# Patient Record
Sex: FEMALE | Race: WHITE | NOT HISPANIC OR LATINO | URBAN - METROPOLITAN AREA
[De-identification: names, ages, dates, MRNs, and addresses within clinical notes are randomized per-mention and may not be internally consistent; named-entity substitution may affect disease eponyms.]

---

## 2017-04-14 ENCOUNTER — EMERGENCY (EMERGENCY)
Facility: HOSPITAL | Age: 22
LOS: 1 days | End: 2017-04-14
Payer: COMMERCIAL

## 2017-04-14 PROCEDURE — 99283 EMERGENCY DEPT VISIT LOW MDM: CPT

## 2019-07-07 ENCOUNTER — EMERGENCY (EMERGENCY)
Facility: HOSPITAL | Age: 24
LOS: 1 days | End: 2019-07-07
Admitting: EMERGENCY MEDICINE
Payer: MEDICAID

## 2019-07-07 PROCEDURE — 99283 EMERGENCY DEPT VISIT LOW MDM: CPT

## 2019-07-26 ENCOUNTER — EMERGENCY (EMERGENCY)
Facility: HOSPITAL | Age: 24
LOS: 1 days | End: 2019-07-26
Admitting: EMERGENCY MEDICINE
Payer: MEDICAID

## 2019-07-26 PROCEDURE — 99284 EMERGENCY DEPT VISIT MOD MDM: CPT

## 2019-08-03 ENCOUNTER — EMERGENCY (EMERGENCY)
Facility: HOSPITAL | Age: 24
LOS: 1 days | End: 2019-08-03
Admitting: EMERGENCY MEDICINE
Payer: MEDICAID

## 2019-08-03 ENCOUNTER — EMERGENCY (EMERGENCY)
Facility: HOSPITAL | Age: 24
LOS: 1 days | End: 2019-08-03
Admitting: EMERGENCY MEDICINE

## 2019-08-03 PROCEDURE — 99283 EMERGENCY DEPT VISIT LOW MDM: CPT

## 2019-08-07 ENCOUNTER — EMERGENCY (EMERGENCY)
Facility: HOSPITAL | Age: 24
LOS: 1 days | End: 2019-08-07
Admitting: EMERGENCY MEDICINE
Payer: MEDICAID

## 2019-08-07 PROCEDURE — 90792 PSYCH DIAG EVAL W/MED SRVCS: CPT | Mod: GT

## 2019-08-07 PROCEDURE — 99283 EMERGENCY DEPT VISIT LOW MDM: CPT

## 2019-08-07 PROCEDURE — 99285 EMERGENCY DEPT VISIT HI MDM: CPT

## 2019-08-08 PROCEDURE — 93010 ELECTROCARDIOGRAM REPORT: CPT

## 2019-08-16 ENCOUNTER — EMERGENCY (EMERGENCY)
Facility: HOSPITAL | Age: 24
LOS: 1 days | End: 2019-08-16
Admitting: EMERGENCY MEDICINE
Payer: MEDICAID

## 2019-08-16 PROCEDURE — 99283 EMERGENCY DEPT VISIT LOW MDM: CPT

## 2019-08-20 ENCOUNTER — EMERGENCY (EMERGENCY)
Facility: HOSPITAL | Age: 24
LOS: 1 days | End: 2019-08-20
Admitting: EMERGENCY MEDICINE
Payer: MEDICAID

## 2019-08-20 PROCEDURE — 99282 EMERGENCY DEPT VISIT SF MDM: CPT

## 2019-10-10 ENCOUNTER — EMERGENCY (EMERGENCY)
Facility: HOSPITAL | Age: 24
LOS: 1 days | End: 2019-10-10
Admitting: EMERGENCY MEDICINE
Payer: MEDICAID

## 2019-10-10 PROCEDURE — 93010 ELECTROCARDIOGRAM REPORT: CPT

## 2019-10-10 PROCEDURE — 99285 EMERGENCY DEPT VISIT HI MDM: CPT

## 2019-10-10 PROCEDURE — 90792 PSYCH DIAG EVAL W/MED SRVCS: CPT | Mod: GT

## 2019-10-10 PROCEDURE — 73130 X-RAY EXAM OF HAND: CPT | Mod: 26,LT

## 2019-10-11 ENCOUNTER — INPATIENT (INPATIENT)
Facility: HOSPITAL | Age: 24
LOS: 23 days | Discharge: HOME | End: 2019-11-04
Attending: PSYCHIATRY & NEUROLOGY | Admitting: PSYCHIATRY & NEUROLOGY
Payer: MEDICAID

## 2019-10-11 VITALS — WEIGHT: 204.81 LBS | TEMPERATURE: 98 F | HEIGHT: 66 IN

## 2019-10-11 DIAGNOSIS — F31.2 BIPOLAR DISORDER, CURRENT EPISODE MANIC SEVERE WITH PSYCHOTIC FEATURES: ICD-10-CM

## 2019-10-11 LAB
ALBUMIN SERPL ELPH-MCNC: 4.4 G/DL — SIGNIFICANT CHANGE UP (ref 3.3–5)
ALP SERPL-CCNC: 58 U/L — SIGNIFICANT CHANGE UP (ref 40–120)
ALT FLD-CCNC: 10 U/L — SIGNIFICANT CHANGE UP (ref 4–33)
AMPHET UR-MCNC: NEGATIVE — SIGNIFICANT CHANGE UP
ANION GAP SERPL CALC-SCNC: 15 MMO/L — HIGH (ref 7–14)
APPEARANCE UR: SIGNIFICANT CHANGE UP
AST SERPL-CCNC: 18 U/L — SIGNIFICANT CHANGE UP (ref 4–32)
BACTERIA # UR AUTO: NEGATIVE — SIGNIFICANT CHANGE UP
BARBITURATES UR SCN-MCNC: NEGATIVE — SIGNIFICANT CHANGE UP
BASOPHILS # BLD AUTO: 0.04 K/UL — SIGNIFICANT CHANGE UP (ref 0–0.2)
BASOPHILS NFR BLD AUTO: 0.8 % — SIGNIFICANT CHANGE UP (ref 0–2)
BENZODIAZ UR-MCNC: NEGATIVE — SIGNIFICANT CHANGE UP
BILIRUB SERPL-MCNC: 0.4 MG/DL — SIGNIFICANT CHANGE UP (ref 0.2–1.2)
BILIRUB UR-MCNC: NEGATIVE — SIGNIFICANT CHANGE UP
BLOOD UR QL VISUAL: NEGATIVE — SIGNIFICANT CHANGE UP
BUN SERPL-MCNC: 12 MG/DL — SIGNIFICANT CHANGE UP (ref 7–23)
CALCIUM SERPL-MCNC: 9.7 MG/DL — SIGNIFICANT CHANGE UP (ref 8.4–10.5)
CANNABINOIDS UR-MCNC: NEGATIVE — SIGNIFICANT CHANGE UP
CHLORIDE SERPL-SCNC: 103 MMOL/L — SIGNIFICANT CHANGE UP (ref 98–107)
CHOLEST SERPL-MCNC: 138 MG/DL — SIGNIFICANT CHANGE UP (ref 120–199)
CO2 SERPL-SCNC: 23 MMOL/L — SIGNIFICANT CHANGE UP (ref 22–31)
COCAINE METAB.OTHER UR-MCNC: NEGATIVE — SIGNIFICANT CHANGE UP
COLOR SPEC: YELLOW — SIGNIFICANT CHANGE UP
CREAT SERPL-MCNC: 0.64 MG/DL — SIGNIFICANT CHANGE UP (ref 0.5–1.3)
EOSINOPHIL # BLD AUTO: 0.11 K/UL — SIGNIFICANT CHANGE UP (ref 0–0.5)
EOSINOPHIL NFR BLD AUTO: 2.1 % — SIGNIFICANT CHANGE UP (ref 0–6)
GLUCOSE SERPL-MCNC: 82 MG/DL — SIGNIFICANT CHANGE UP (ref 70–99)
GLUCOSE UR-MCNC: NEGATIVE — SIGNIFICANT CHANGE UP
HBA1C BLD-MCNC: 4.8 % — SIGNIFICANT CHANGE UP (ref 4–5.6)
HCG SERPL-ACNC: < 5 MIU/ML — SIGNIFICANT CHANGE UP
HCT VFR BLD CALC: 43.9 % — SIGNIFICANT CHANGE UP (ref 34.5–45)
HDLC SERPL-MCNC: 60 MG/DL — SIGNIFICANT CHANGE UP (ref 45–65)
HGB BLD-MCNC: 14.3 G/DL — SIGNIFICANT CHANGE UP (ref 11.5–15.5)
HYALINE CASTS # UR AUTO: HIGH
IMM GRANULOCYTES NFR BLD AUTO: 0.2 % — SIGNIFICANT CHANGE UP (ref 0–1.5)
KETONES UR-MCNC: NEGATIVE — SIGNIFICANT CHANGE UP
LEUKOCYTE ESTERASE UR-ACNC: SIGNIFICANT CHANGE UP
LIPID PNL WITH DIRECT LDL SERPL: 76 MG/DL — SIGNIFICANT CHANGE UP
LYMPHOCYTES # BLD AUTO: 1.71 K/UL — SIGNIFICANT CHANGE UP (ref 1–3.3)
LYMPHOCYTES # BLD AUTO: 32.6 % — SIGNIFICANT CHANGE UP (ref 13–44)
MCHC RBC-ENTMCNC: 29.7 PG — SIGNIFICANT CHANGE UP (ref 27–34)
MCHC RBC-ENTMCNC: 32.6 % — SIGNIFICANT CHANGE UP (ref 32–36)
MCV RBC AUTO: 91.3 FL — SIGNIFICANT CHANGE UP (ref 80–100)
METHADONE UR-MCNC: NEGATIVE — SIGNIFICANT CHANGE UP
MONOCYTES # BLD AUTO: 0.29 K/UL — SIGNIFICANT CHANGE UP (ref 0–0.9)
MONOCYTES NFR BLD AUTO: 5.5 % — SIGNIFICANT CHANGE UP (ref 2–14)
NEUTROPHILS # BLD AUTO: 3.09 K/UL — SIGNIFICANT CHANGE UP (ref 1.8–7.4)
NEUTROPHILS NFR BLD AUTO: 58.8 % — SIGNIFICANT CHANGE UP (ref 43–77)
NITRITE UR-MCNC: NEGATIVE — SIGNIFICANT CHANGE UP
NRBC # FLD: 0 K/UL — SIGNIFICANT CHANGE UP (ref 0–0)
OPIATES UR-MCNC: NEGATIVE — SIGNIFICANT CHANGE UP
OXYCODONE UR-MCNC: NEGATIVE — SIGNIFICANT CHANGE UP
PCP UR-MCNC: NEGATIVE — SIGNIFICANT CHANGE UP
PH UR: 6.5 — SIGNIFICANT CHANGE UP (ref 5–8)
PLATELET # BLD AUTO: 219 K/UL — SIGNIFICANT CHANGE UP (ref 150–400)
PMV BLD: 10.8 FL — SIGNIFICANT CHANGE UP (ref 7–13)
POTASSIUM SERPL-MCNC: 3.8 MMOL/L — SIGNIFICANT CHANGE UP (ref 3.5–5.3)
POTASSIUM SERPL-SCNC: 3.8 MMOL/L — SIGNIFICANT CHANGE UP (ref 3.5–5.3)
PROT SERPL-MCNC: 6.9 G/DL — SIGNIFICANT CHANGE UP (ref 6–8.3)
PROT UR-MCNC: 10 — SIGNIFICANT CHANGE UP
RBC # BLD: 4.81 M/UL — SIGNIFICANT CHANGE UP (ref 3.8–5.2)
RBC # FLD: 11.9 % — SIGNIFICANT CHANGE UP (ref 10.3–14.5)
RBC CASTS # UR COMP ASSIST: SIGNIFICANT CHANGE UP (ref 0–?)
SODIUM SERPL-SCNC: 141 MMOL/L — SIGNIFICANT CHANGE UP (ref 135–145)
SP GR SPEC: 1.02 — SIGNIFICANT CHANGE UP (ref 1–1.04)
SQUAMOUS # UR AUTO: SIGNIFICANT CHANGE UP
TRIGL SERPL-MCNC: 61 MG/DL — SIGNIFICANT CHANGE UP (ref 10–149)
TSH SERPL-MCNC: 2.77 UIU/ML — SIGNIFICANT CHANGE UP (ref 0.27–4.2)
UROBILINOGEN FLD QL: NORMAL — SIGNIFICANT CHANGE UP
WBC # BLD: 5.25 K/UL — SIGNIFICANT CHANGE UP (ref 3.8–10.5)
WBC # FLD AUTO: 5.25 K/UL — SIGNIFICANT CHANGE UP (ref 3.8–10.5)
WBC UR QL: HIGH (ref 0–?)

## 2019-10-11 PROCEDURE — 93010 ELECTROCARDIOGRAM REPORT: CPT

## 2019-10-11 PROCEDURE — 99232 SBSQ HOSP IP/OBS MODERATE 35: CPT

## 2019-10-11 RX ORDER — OLANZAPINE 15 MG/1
5 TABLET, FILM COATED ORAL AT BEDTIME
Refills: 0 | Status: DISCONTINUED | OUTPATIENT
Start: 2019-10-11 | End: 2019-10-13

## 2019-10-11 RX ORDER — DIPHENHYDRAMINE HCL 50 MG
50 CAPSULE ORAL ONCE
Refills: 0 | Status: DISCONTINUED | OUTPATIENT
Start: 2019-10-11 | End: 2019-11-04

## 2019-10-11 RX ORDER — HALOPERIDOL DECANOATE 100 MG/ML
5 INJECTION INTRAMUSCULAR ONCE
Refills: 0 | Status: DISCONTINUED | OUTPATIENT
Start: 2019-10-11 | End: 2019-11-04

## 2019-10-11 RX ORDER — DIPHENHYDRAMINE HCL 50 MG
50 CAPSULE ORAL EVERY 6 HOURS
Refills: 0 | Status: DISCONTINUED | OUTPATIENT
Start: 2019-10-11 | End: 2019-11-04

## 2019-10-11 RX ORDER — HALOPERIDOL DECANOATE 100 MG/ML
5 INJECTION INTRAMUSCULAR EVERY 6 HOURS
Refills: 0 | Status: DISCONTINUED | OUTPATIENT
Start: 2019-10-11 | End: 2019-11-04

## 2019-10-11 RX ADMIN — HALOPERIDOL DECANOATE 5 MILLIGRAM(S): 100 INJECTION INTRAMUSCULAR at 18:21

## 2019-10-11 RX ADMIN — Medication 2 MILLIGRAM(S): at 08:59

## 2019-10-11 RX ADMIN — Medication 1 MILLIGRAM(S): at 20:16

## 2019-10-12 PROCEDURE — 99232 SBSQ HOSP IP/OBS MODERATE 35: CPT

## 2019-10-12 RX ADMIN — HALOPERIDOL DECANOATE 5 MILLIGRAM(S): 100 INJECTION INTRAMUSCULAR at 18:08

## 2019-10-12 RX ADMIN — Medication 1 MILLIGRAM(S): at 18:08

## 2019-10-12 RX ADMIN — Medication 50 MILLIGRAM(S): at 18:07

## 2019-10-13 PROCEDURE — 99232 SBSQ HOSP IP/OBS MODERATE 35: CPT

## 2019-10-13 RX ORDER — OLANZAPINE 15 MG/1
5 TABLET, FILM COATED ORAL DAILY
Refills: 0 | Status: DISCONTINUED | OUTPATIENT
Start: 2019-10-14 | End: 2019-10-14

## 2019-10-13 RX ORDER — OLANZAPINE 15 MG/1
5 TABLET, FILM COATED ORAL ONCE
Refills: 0 | Status: COMPLETED | OUTPATIENT
Start: 2019-10-13 | End: 2019-10-13

## 2019-10-13 RX ADMIN — Medication 1 MILLIGRAM(S): at 09:08

## 2019-10-13 RX ADMIN — HALOPERIDOL DECANOATE 5 MILLIGRAM(S): 100 INJECTION INTRAMUSCULAR at 08:55

## 2019-10-13 RX ADMIN — OLANZAPINE 5 MILLIGRAM(S): 15 TABLET, FILM COATED ORAL at 11:55

## 2019-10-13 NOTE — CHART NOTE - NSCHARTNOTEFT_GEN_A_CORE
Screening Medical Evaluation  Patient Admitted from: [facility] ([facility])    [facility] admitting diagnosis: Bipolar affective disorder, currently manic, severe, with psychotic features    PAST MEDICAL & SURGICAL HISTORY:  Hx of Nephrolithiasis  D&C (2017, not in [place])    ALLERGIES  -NKDA  -No food allergies    INTOLERANCES  -None    SOCIAL HISTORY:   Denies EtOH use  Denies Drug use  Denies tobacco product use  Unemployed    FAMILY HISTORY:  None     MEDICATIONS  (STANDING):  OLANZapine 5 milliGRAM(s) Oral at bedtime    MEDICATIONS  (PRN):  diphenhydrAMINE 50 milliGRAM(s) Oral every 6 hours PRN agitation  diphenhydrAMINE   Injectable 50 milliGRAM(s) IntraMuscular once PRN agitation  haloperidol     Tablet 5 milliGRAM(s) Oral every 6 hours PRN agitation  haloperidol    Injectable 5 milliGRAM(s) IntraMuscular once PRN agitation  LORazepam     Tablet 1 milliGRAM(s) Oral every 6 hours PRN anxiety/insomnia/agitation  LORazepam   Injectable 2 milliGRAM(s) IntraMuscular once PRN agitation      Vital Signs Last 24 Hrs  T(C): 37.1 (12 Oct 2019 19:19), Max: 37.7 (12 Oct 2019 08:59)  T(F): 98.7 (12 Oct 2019 19:19), Max: 99.8 (12 Oct 2019 08:59)  HR: 70   BP: 122/76  RR: --  SpO2: --  CAPILLARY BLOOD GLUCOSE    PHYSICAL EXAM:  GENERAL: NAD, well-developed, well-nourished, sleeping upon arrival  HEAD:  Atraumatic, Normocephalic  EYES: EOMI, PERRLA, conjunctiva and sclera clear  NECK: Supple  CHEST/LUNG: Clear to auscultation bilaterally; No wheeze, crackles, rhonchi  HEART: Regular rate and rhythm; +s1 and +s2, No murmurs, rubs, or gallops  ABDOMEN: Soft, Nontender, Nondistended; normoactive Bowel sounds present  EXTREMITIES:  2+ Peripheral Pulses, No clubbing, or edema  PSYCH: AAOx3, cooperative, pleasant  NEUROLOGY: non-focal  SKIN: No rashes or lesions, tattoo right calf, well-healed scar on right elbow      LABS:                        14.3   5.25  )-----------( 219      ( 11 Oct 2019 10:00 )             43.9     10-11    141  |  103  |  12  ----------------------------<  82  3.8   |  23  |  0.64    Ca    9.7      11 Oct 2019 10:00    TPro  6.9  /  Alb  4.4  /  TBili  0.4  /  DBili  x   /  AST  18  /  ALT  10  /  AlkPhos  58  10-11          Urinalysis Basic - ( 11 Oct 2019 16:00 )    Color: YELLOW / Appearance: Lt TURBID / S.016 / pH: 6.5  Gluc: NEGATIVE / Ketone: NEGATIVE  / Bili: NEGATIVE / Urobili: NORMAL   Blood: NEGATIVE / Protein: 10 / Nitrite: NEGATIVE   Leuk Esterase: LARGE / RBC: 3-5 / WBC 26-50   Sq Epi: MODERATE / Non Sq Epi: x / Bacteria: NEGATIVE      RADIOLOGY & ADDITIONAL TESTS:    Assessment and Plan:  Pt is a 23y.o female with no significant PMHx who presents to [facility] from [facility] for further management of Bipolar affective disorder, currently manic, severe, with psychotic features. Pt currently denies any medical concern including fever, chills, visual changes (double vision, scotomas, vision loss), HA, SOB, CP, Abdominal pain, n/v, constipation, diarrhea, urinary symptoms (dysuria, hematuria, incontinence, frequency/urgency, urethral discharge) or changes in bowel movements. Physical Exam unremarkable.    Labs personally reviewed- CBC, CMP, HCG, Lipid Profile, TSH, and A1c (4.8%) all WNL. UTox-Negative. UA positive for leukocyte esterase.     EKG personal reviewed showing--- NSR 84bpm with QTc level of 437ms. No evidence of ischemia/infarct or arrhythmias. No inverted/peaked/flattened t-waves.     #) Asymptomatic Bacteriuria: Pt with no urinary sxs (denies dysuria, frequency/urgency, urethral discharge, vaginal discharge). Will continue to monitor overnight.    #) Bipolar affective disorder, currently manic, severe, with psychotic features
11.4

## 2019-10-14 PROCEDURE — 99232 SBSQ HOSP IP/OBS MODERATE 35: CPT

## 2019-10-14 RX ORDER — OLANZAPINE 15 MG/1
10 TABLET, FILM COATED ORAL DAILY
Refills: 0 | Status: DISCONTINUED | OUTPATIENT
Start: 2019-10-14 | End: 2019-10-18

## 2019-10-14 RX ADMIN — Medication 1 MILLIGRAM(S): at 08:53

## 2019-10-15 PROCEDURE — 99232 SBSQ HOSP IP/OBS MODERATE 35: CPT

## 2019-10-15 RX ADMIN — OLANZAPINE 10 MILLIGRAM(S): 15 TABLET, FILM COATED ORAL at 08:58

## 2019-10-15 RX ADMIN — Medication 1 MILLIGRAM(S): at 08:24

## 2019-10-15 RX ADMIN — HALOPERIDOL DECANOATE 5 MILLIGRAM(S): 100 INJECTION INTRAMUSCULAR at 09:05

## 2019-10-16 LAB — HIV 1+2 AB+HIV1 P24 AG SERPL QL IA: SIGNIFICANT CHANGE UP

## 2019-10-16 PROCEDURE — 99232 SBSQ HOSP IP/OBS MODERATE 35: CPT

## 2019-10-16 RX ADMIN — Medication 50 MILLIGRAM(S): at 09:03

## 2019-10-16 RX ADMIN — Medication 1 MILLIGRAM(S): at 09:03

## 2019-10-16 RX ADMIN — HALOPERIDOL DECANOATE 5 MILLIGRAM(S): 100 INJECTION INTRAMUSCULAR at 09:03

## 2019-10-16 RX ADMIN — OLANZAPINE 10 MILLIGRAM(S): 15 TABLET, FILM COATED ORAL at 08:39

## 2019-10-17 PROCEDURE — 99232 SBSQ HOSP IP/OBS MODERATE 35: CPT

## 2019-10-17 RX ADMIN — OLANZAPINE 10 MILLIGRAM(S): 15 TABLET, FILM COATED ORAL at 07:54

## 2019-10-17 RX ADMIN — HALOPERIDOL DECANOATE 5 MILLIGRAM(S): 100 INJECTION INTRAMUSCULAR at 07:55

## 2019-10-17 RX ADMIN — Medication 1 MILLIGRAM(S): at 07:55

## 2019-10-17 RX ADMIN — Medication 1 MILLIGRAM(S): at 17:19

## 2019-10-17 RX ADMIN — Medication 50 MILLIGRAM(S): at 07:54

## 2019-10-18 PROCEDURE — 99232 SBSQ HOSP IP/OBS MODERATE 35: CPT

## 2019-10-18 RX ORDER — OLANZAPINE 15 MG/1
15 TABLET, FILM COATED ORAL DAILY
Refills: 0 | Status: DISCONTINUED | OUTPATIENT
Start: 2019-10-18 | End: 2019-10-29

## 2019-10-18 RX ADMIN — Medication 1 MILLIGRAM(S): at 08:27

## 2019-10-18 RX ADMIN — Medication 0.5 MILLIGRAM(S): at 15:24

## 2019-10-18 RX ADMIN — OLANZAPINE 10 MILLIGRAM(S): 15 TABLET, FILM COATED ORAL at 08:27

## 2019-10-18 RX ADMIN — HALOPERIDOL DECANOATE 5 MILLIGRAM(S): 100 INJECTION INTRAMUSCULAR at 08:27

## 2019-10-18 RX ADMIN — Medication 50 MILLIGRAM(S): at 08:27

## 2019-10-19 RX ADMIN — Medication 0.5 MILLIGRAM(S): at 14:47

## 2019-10-19 RX ADMIN — OLANZAPINE 15 MILLIGRAM(S): 15 TABLET, FILM COATED ORAL at 08:47

## 2019-10-19 RX ADMIN — Medication 0.5 MILLIGRAM(S): at 08:47

## 2019-10-19 RX ADMIN — HALOPERIDOL DECANOATE 5 MILLIGRAM(S): 100 INJECTION INTRAMUSCULAR at 16:50

## 2019-10-19 RX ADMIN — Medication 50 MILLIGRAM(S): at 16:49

## 2019-10-20 RX ADMIN — Medication 0.5 MILLIGRAM(S): at 16:45

## 2019-10-20 RX ADMIN — Medication 0.5 MILLIGRAM(S): at 09:53

## 2019-10-20 RX ADMIN — OLANZAPINE 15 MILLIGRAM(S): 15 TABLET, FILM COATED ORAL at 08:53

## 2019-10-21 PROCEDURE — 99232 SBSQ HOSP IP/OBS MODERATE 35: CPT

## 2019-10-21 RX ORDER — OLANZAPINE 15 MG/1
5 TABLET, FILM COATED ORAL AT BEDTIME
Refills: 0 | Status: DISCONTINUED | OUTPATIENT
Start: 2019-10-21 | End: 2019-10-29

## 2019-10-21 RX ADMIN — HALOPERIDOL DECANOATE 5 MILLIGRAM(S): 100 INJECTION INTRAMUSCULAR at 16:11

## 2019-10-21 RX ADMIN — Medication 0.5 MILLIGRAM(S): at 16:11

## 2019-10-21 RX ADMIN — Medication 0.5 MILLIGRAM(S): at 09:47

## 2019-10-21 RX ADMIN — OLANZAPINE 15 MILLIGRAM(S): 15 TABLET, FILM COATED ORAL at 09:46

## 2019-10-21 RX ADMIN — Medication 50 MILLIGRAM(S): at 09:46

## 2019-10-21 RX ADMIN — OLANZAPINE 5 MILLIGRAM(S): 15 TABLET, FILM COATED ORAL at 21:00

## 2019-10-22 PROCEDURE — 99232 SBSQ HOSP IP/OBS MODERATE 35: CPT

## 2019-10-22 RX ORDER — HYDROXYZINE HCL 10 MG
50 TABLET ORAL EVERY 8 HOURS
Refills: 0 | Status: DISCONTINUED | OUTPATIENT
Start: 2019-10-22 | End: 2019-11-04

## 2019-10-22 RX ADMIN — Medication 0.5 MILLIGRAM(S): at 09:01

## 2019-10-22 RX ADMIN — Medication 50 MILLIGRAM(S): at 17:30

## 2019-10-22 RX ADMIN — OLANZAPINE 15 MILLIGRAM(S): 15 TABLET, FILM COATED ORAL at 09:01

## 2019-10-22 RX ADMIN — Medication 0.5 MILLIGRAM(S): at 17:29

## 2019-10-23 PROCEDURE — 99232 SBSQ HOSP IP/OBS MODERATE 35: CPT

## 2019-10-23 RX ADMIN — Medication 50 MILLIGRAM(S): at 11:13

## 2019-10-23 RX ADMIN — Medication 50 MILLIGRAM(S): at 17:27

## 2019-10-23 RX ADMIN — OLANZAPINE 5 MILLIGRAM(S): 15 TABLET, FILM COATED ORAL at 20:02

## 2019-10-23 RX ADMIN — OLANZAPINE 15 MILLIGRAM(S): 15 TABLET, FILM COATED ORAL at 09:13

## 2019-10-23 RX ADMIN — Medication 0.5 MILLIGRAM(S): at 17:27

## 2019-10-23 RX ADMIN — Medication 0.5 MILLIGRAM(S): at 11:13

## 2019-10-24 PROCEDURE — 99232 SBSQ HOSP IP/OBS MODERATE 35: CPT

## 2019-10-24 RX ADMIN — Medication 0.5 MILLIGRAM(S): at 09:10

## 2019-10-24 RX ADMIN — OLANZAPINE 5 MILLIGRAM(S): 15 TABLET, FILM COATED ORAL at 21:19

## 2019-10-24 RX ADMIN — OLANZAPINE 15 MILLIGRAM(S): 15 TABLET, FILM COATED ORAL at 08:55

## 2019-10-24 RX ADMIN — Medication 50 MILLIGRAM(S): at 13:05

## 2019-10-24 RX ADMIN — HALOPERIDOL DECANOATE 5 MILLIGRAM(S): 100 INJECTION INTRAMUSCULAR at 16:28

## 2019-10-24 RX ADMIN — Medication 0.5 MILLIGRAM(S): at 16:16

## 2019-10-25 PROCEDURE — 99232 SBSQ HOSP IP/OBS MODERATE 35: CPT

## 2019-10-25 RX ADMIN — OLANZAPINE 15 MILLIGRAM(S): 15 TABLET, FILM COATED ORAL at 09:11

## 2019-10-25 RX ADMIN — Medication 50 MILLIGRAM(S): at 09:55

## 2019-10-25 RX ADMIN — Medication 50 MILLIGRAM(S): at 17:03

## 2019-10-25 RX ADMIN — Medication 0.5 MILLIGRAM(S): at 09:55

## 2019-10-25 RX ADMIN — Medication 0.5 MILLIGRAM(S): at 16:16

## 2019-10-26 RX ADMIN — Medication 50 MILLIGRAM(S): at 17:52

## 2019-10-26 RX ADMIN — Medication 50 MILLIGRAM(S): at 08:49

## 2019-10-26 RX ADMIN — OLANZAPINE 15 MILLIGRAM(S): 15 TABLET, FILM COATED ORAL at 08:39

## 2019-10-26 RX ADMIN — Medication 0.5 MILLIGRAM(S): at 08:40

## 2019-10-26 RX ADMIN — OLANZAPINE 5 MILLIGRAM(S): 15 TABLET, FILM COATED ORAL at 20:09

## 2019-10-27 RX ORDER — SIMETHICONE 80 MG/1
80 TABLET, CHEWABLE ORAL ONCE
Refills: 0 | Status: COMPLETED | OUTPATIENT
Start: 2019-10-27 | End: 2019-10-27

## 2019-10-27 RX ADMIN — OLANZAPINE 5 MILLIGRAM(S): 15 TABLET, FILM COATED ORAL at 20:00

## 2019-10-27 RX ADMIN — SIMETHICONE 80 MILLIGRAM(S): 80 TABLET, CHEWABLE ORAL at 21:02

## 2019-10-27 RX ADMIN — Medication 50 MILLIGRAM(S): at 13:11

## 2019-10-27 RX ADMIN — Medication 0.5 MILLIGRAM(S): at 11:04

## 2019-10-27 RX ADMIN — Medication 0.5 MILLIGRAM(S): at 17:04

## 2019-10-27 RX ADMIN — OLANZAPINE 15 MILLIGRAM(S): 15 TABLET, FILM COATED ORAL at 10:12

## 2019-10-28 PROCEDURE — 99232 SBSQ HOSP IP/OBS MODERATE 35: CPT

## 2019-10-28 RX ADMIN — Medication 50 MILLIGRAM(S): at 09:07

## 2019-10-28 RX ADMIN — Medication 0.5 MILLIGRAM(S): at 09:07

## 2019-10-28 RX ADMIN — OLANZAPINE 15 MILLIGRAM(S): 15 TABLET, FILM COATED ORAL at 08:30

## 2019-10-28 RX ADMIN — Medication 50 MILLIGRAM(S): at 17:23

## 2019-10-28 RX ADMIN — Medication 0.5 MILLIGRAM(S): at 17:24

## 2019-10-29 PROCEDURE — 99232 SBSQ HOSP IP/OBS MODERATE 35: CPT

## 2019-10-29 RX ORDER — OLANZAPINE 15 MG/1
20 TABLET, FILM COATED ORAL DAILY
Refills: 0 | Status: DISCONTINUED | OUTPATIENT
Start: 2019-10-30 | End: 2019-11-04

## 2019-10-29 RX ADMIN — OLANZAPINE 15 MILLIGRAM(S): 15 TABLET, FILM COATED ORAL at 11:18

## 2019-10-29 RX ADMIN — Medication 0.5 MILLIGRAM(S): at 17:08

## 2019-10-29 RX ADMIN — Medication 0.5 MILLIGRAM(S): at 11:00

## 2019-10-30 PROCEDURE — 99232 SBSQ HOSP IP/OBS MODERATE 35: CPT

## 2019-10-30 RX ADMIN — Medication 0.5 MILLIGRAM(S): at 16:03

## 2019-10-30 RX ADMIN — OLANZAPINE 20 MILLIGRAM(S): 15 TABLET, FILM COATED ORAL at 08:44

## 2019-10-31 PROCEDURE — 99232 SBSQ HOSP IP/OBS MODERATE 35: CPT

## 2019-10-31 RX ADMIN — Medication 0.5 MILLIGRAM(S): at 15:16

## 2019-10-31 RX ADMIN — OLANZAPINE 20 MILLIGRAM(S): 15 TABLET, FILM COATED ORAL at 08:37

## 2019-11-01 PROCEDURE — 99232 SBSQ HOSP IP/OBS MODERATE 35: CPT

## 2019-11-01 PROCEDURE — 99223 1ST HOSP IP/OBS HIGH 75: CPT

## 2019-11-01 RX ADMIN — Medication 50 MILLIGRAM(S): at 15:48

## 2019-11-01 RX ADMIN — Medication 0.5 MILLIGRAM(S): at 11:29

## 2019-11-01 RX ADMIN — OLANZAPINE 20 MILLIGRAM(S): 15 TABLET, FILM COATED ORAL at 09:37

## 2019-11-01 NOTE — CONSULT NOTE ADULT - SUBJECTIVE AND OBJECTIVE BOX
HPI: Pt is 23F admitted to [facility] 10/11/19 for managemeng of psychosis.  Pt states that she thinks she aspirated a piece of food and that she feels that no air is entering the left side of her body.  She had stated to NP that her left nose is blocked but she denies that now.  She says that she did not cough or choke on food but she feels liek she felt the piece of food go down the wrong pipe into her lung.      PAST MEDICAL & SURGICAL HISTORY:  nephrolithiasis      Review of Systems:   CONSTITUTIONAL: No fever, weight loss, or fatigue  EYES: No eye pain, visual disturbances, or discharge  ENMT:  No difficulty hearing, tinnitus, vertigo; No sinus or throat pain  NECK: No pain or stiffness  RESPIRATORY: No cough, wheezing, chills or hemoptysis; No shortness of breath  CARDIOVASCULAR: No chest pain, palpitations, dizziness, or leg swelling  GASTROINTESTINAL: No abdominal or epigastric pain. No nausea, vomiting, or hematemesis; No diarrhea or constipation. No melena or hematochezia.  GENITOURINARY: No dysuria, frequency, hematuria, or incontinence  NEUROLOGICAL: No headaches, memory loss, loss of strength, numbness, or tremors  SKIN: No itching, burning, rashes, or lesions   LYMPH NODES: No enlarged glands  ENDOCRINE: No heat or cold intolerance; No hair loss  MUSCULOSKELETAL: No joint pain or swelling; No muscle, back, or extremity pain  HEME/LYMPH: No easy bruising, or bleeding gums  ALLERY AND IMMUNOLOGIC: No hives or eczema    Allergies    No Known Allergies    Intolerances        Social History: denies etoh tob or idu    FAMILY HISTORY:  noncontributory    MEDICATIONS  (STANDING):  OLANZapine Disintegrating Tablet 20 milliGRAM(s) Oral daily    MEDICATIONS  (PRN):  diphenhydrAMINE 50 milliGRAM(s) Oral every 6 hours PRN agitation  diphenhydrAMINE   Injectable 50 milliGRAM(s) IntraMuscular once PRN agitation  haloperidol     Tablet 5 milliGRAM(s) Oral every 6 hours PRN agitation  haloperidol    Injectable 5 milliGRAM(s) IntraMuscular once PRN agitation  hydrOXYzine hydrochloride 50 milliGRAM(s) Oral every 8 hours PRN anxiety  LORazepam     Tablet 0.5 milliGRAM(s) Oral every 8 hours PRN anxiety/insomnia/agitation  LORazepam   Injectable 2 milliGRAM(s) IntraMuscular once PRN agitation      Vital Signs Last 24 Hrs  T(C): 36.2 (01 Nov 2019 08:25), Max: 36.2 (01 Nov 2019 08:25)  T(F): 97.2 (01 Nov 2019 08:25), Max: 97.2 (01 Nov 2019 08:25)  HR: 68 (01 Nov 2019 08:25) (68 - 68)  BP: 103/60 (01 Nov 2019 08:25) (103/60 - 103/60)  BP(mean): --  RR: 15  SpO2: --  CAPILLARY BLOOD GLUCOSE      PHYSICAL EXAM:  GENERAL: NAD, well-developed  HEAD:  Atraumatic, Normocephalic  EYES: EOMI, conjunctiva and sclera clear  NECK: Supple, No JVD  CHEST/LUNG: Clear to auscultation bilaterally; No wheeze  HEART: Regular rate and rhythm; No murmurs, rubs, or gallops  ABDOMEN: Soft, Nontender, Nondistended; Bowel sounds present  EXTREMITIES:  2+ Peripheral Pulses, No clubbing, cyanosis, or edema  NEUROLOGY: non-focal  SKIN: No rashes or lesions    LABS:  reviewed in [facility]          Care Discussed with Consultants/Other Providers: NP

## 2019-11-01 NOTE — CONSULT NOTE ADULT - ASSESSMENT
23F admitted for psychosis with c/o breathing problem    Plan:  Breathing problem: There is good air entry in both lungs with no wheezing or signs of aspiration.  Patient seemed reassured by the exam and by explaining that if she had aspirated she would have choked or coughed.  Continue to observe, suspect a medically unexplained symptom that may have a psychogenic component.    Psychosis: Management per primary team

## 2019-11-02 RX ADMIN — OLANZAPINE 20 MILLIGRAM(S): 15 TABLET, FILM COATED ORAL at 10:01

## 2019-11-02 RX ADMIN — Medication 0.5 MILLIGRAM(S): at 11:45

## 2019-11-03 RX ADMIN — OLANZAPINE 20 MILLIGRAM(S): 15 TABLET, FILM COATED ORAL at 08:52

## 2019-11-03 RX ADMIN — Medication 0.5 MILLIGRAM(S): at 10:40

## 2019-11-03 RX ADMIN — Medication 50 MILLIGRAM(S): at 17:17

## 2019-11-03 RX ADMIN — Medication 50 MILLIGRAM(S): at 08:52

## 2019-11-04 VITALS — TEMPERATURE: 98 F

## 2019-11-04 PROCEDURE — 99238 HOSP IP/OBS DSCHRG MGMT 30/<: CPT

## 2019-11-04 RX ORDER — OLANZAPINE 15 MG/1
1 TABLET, FILM COATED ORAL
Qty: 14 | Refills: 0
Start: 2019-11-04 | End: 2019-11-17

## 2019-11-04 RX ADMIN — OLANZAPINE 20 MILLIGRAM(S): 15 TABLET, FILM COATED ORAL at 08:15

## 2019-11-04 RX ADMIN — Medication 0.5 MILLIGRAM(S): at 07:59

## 2019-12-01 ENCOUNTER — OUTPATIENT (OUTPATIENT)
Dept: OUTPATIENT SERVICES | Facility: HOSPITAL | Age: 24
LOS: 1 days | End: 2019-12-01
Payer: MEDICAID

## 2019-12-10 ENCOUNTER — EMERGENCY (EMERGENCY)
Facility: HOSPITAL | Age: 24
LOS: 1 days | End: 2019-12-10
Admitting: EMERGENCY MEDICINE
Payer: MEDICAID

## 2019-12-10 PROCEDURE — 90792 PSYCH DIAG EVAL W/MED SRVCS: CPT | Mod: GT

## 2019-12-10 PROCEDURE — 99284 EMERGENCY DEPT VISIT MOD MDM: CPT

## 2019-12-19 DIAGNOSIS — Z71.89 OTHER SPECIFIED COUNSELING: ICD-10-CM

## 2019-12-23 DIAGNOSIS — Z76.89 PERSONS ENCOUNTERING HEALTH SERVICES IN OTHER SPECIFIED CIRCUMSTANCES: ICD-10-CM

## 2020-01-01 PROCEDURE — G9005: CPT

## 2020-01-14 ENCOUNTER — EMERGENCY (EMERGENCY)
Facility: HOSPITAL | Age: 25
LOS: 1 days | End: 2020-01-14
Admitting: EMERGENCY MEDICINE
Payer: MEDICAID

## 2020-01-14 PROCEDURE — 99284 EMERGENCY DEPT VISIT MOD MDM: CPT

## 2020-01-14 PROCEDURE — 76856 US EXAM PELVIC COMPLETE: CPT | Mod: 26

## 2020-01-14 PROCEDURE — 76770 US EXAM ABDO BACK WALL COMP: CPT | Mod: 26

## 2020-02-01 ENCOUNTER — OUTPATIENT (OUTPATIENT)
Dept: OUTPATIENT SERVICES | Facility: HOSPITAL | Age: 25
LOS: 1 days | End: 2020-02-01
Payer: MEDICAID

## 2020-02-01 PROCEDURE — G9005: CPT

## 2020-02-10 ENCOUNTER — EMERGENCY (EMERGENCY)
Facility: HOSPITAL | Age: 25
LOS: 1 days | End: 2020-02-10
Admitting: EMERGENCY MEDICINE
Payer: MEDICAID

## 2020-02-10 PROCEDURE — 90792 PSYCH DIAG EVAL W/MED SRVCS: CPT | Mod: GT

## 2020-02-10 PROCEDURE — 99285 EMERGENCY DEPT VISIT HI MDM: CPT

## 2020-02-11 ENCOUNTER — INPATIENT (INPATIENT)
Facility: HOSPITAL | Age: 25
LOS: 44 days | Discharge: HOME | DRG: 885 | End: 2020-03-27
Attending: PSYCHIATRY & NEUROLOGY | Admitting: PSYCHIATRY & NEUROLOGY
Payer: MEDICAID

## 2020-02-11 VITALS
SYSTOLIC BLOOD PRESSURE: 95 MMHG | HEIGHT: 64 IN | HEART RATE: 80 BPM | WEIGHT: 243.61 LBS | DIASTOLIC BLOOD PRESSURE: 73 MMHG | RESPIRATION RATE: 16 BRPM | TEMPERATURE: 99 F | OXYGEN SATURATION: 98 %

## 2020-02-11 DIAGNOSIS — F20.89 OTHER SCHIZOPHRENIA: ICD-10-CM

## 2020-02-11 RX ORDER — OLANZAPINE 15 MG/1
20 TABLET, FILM COATED ORAL AT BEDTIME
Refills: 0 | Status: DISCONTINUED | OUTPATIENT
Start: 2020-02-11 | End: 2020-02-12

## 2020-02-11 RX ADMIN — Medication 0.5 MILLIGRAM(S): at 19:59

## 2020-02-12 DIAGNOSIS — F25.9 SCHIZOAFFECTIVE DISORDER, UNSPECIFIED: ICD-10-CM

## 2020-02-12 LAB
ALBUMIN SERPL ELPH-MCNC: 3.6 G/DL — SIGNIFICANT CHANGE UP (ref 3.3–5)
ALP SERPL-CCNC: 62 U/L — SIGNIFICANT CHANGE UP (ref 30–120)
ALT FLD-CCNC: 16 U/L DA — SIGNIFICANT CHANGE UP (ref 10–60)
ANION GAP SERPL CALC-SCNC: 6 MMOL/L — SIGNIFICANT CHANGE UP (ref 5–17)
AST SERPL-CCNC: 11 U/L — SIGNIFICANT CHANGE UP (ref 10–40)
BASOPHILS # BLD AUTO: 0.05 K/UL — SIGNIFICANT CHANGE UP (ref 0–0.2)
BASOPHILS NFR BLD AUTO: 0.9 % — SIGNIFICANT CHANGE UP (ref 0–2)
BILIRUB SERPL-MCNC: 0.4 MG/DL — SIGNIFICANT CHANGE UP (ref 0.2–1.2)
BUN SERPL-MCNC: 10 MG/DL — SIGNIFICANT CHANGE UP (ref 7–23)
CALCIUM SERPL-MCNC: 9 MG/DL — SIGNIFICANT CHANGE UP (ref 8.4–10.5)
CHLORIDE SERPL-SCNC: 106 MMOL/L — SIGNIFICANT CHANGE UP (ref 96–108)
CO2 SERPL-SCNC: 30 MMOL/L — SIGNIFICANT CHANGE UP (ref 22–31)
CREAT SERPL-MCNC: 0.67 MG/DL — SIGNIFICANT CHANGE UP (ref 0.5–1.3)
EOSINOPHIL # BLD AUTO: 0.15 K/UL — SIGNIFICANT CHANGE UP (ref 0–0.5)
EOSINOPHIL NFR BLD AUTO: 2.6 % — SIGNIFICANT CHANGE UP (ref 0–6)
GLUCOSE SERPL-MCNC: 99 MG/DL — SIGNIFICANT CHANGE UP (ref 70–99)
HBA1C BLD-MCNC: 5 % — SIGNIFICANT CHANGE UP (ref 4–5.6)
HCG SERPL-ACNC: <1 MIU/ML — SIGNIFICANT CHANGE UP
HCT VFR BLD CALC: 45.2 % — HIGH (ref 34.5–45)
HGB BLD-MCNC: 15.2 G/DL — SIGNIFICANT CHANGE UP (ref 11.5–15.5)
IMM GRANULOCYTES NFR BLD AUTO: 0.2 % — SIGNIFICANT CHANGE UP (ref 0–1.5)
LYMPHOCYTES # BLD AUTO: 1.6 K/UL — SIGNIFICANT CHANGE UP (ref 1–3.3)
LYMPHOCYTES # BLD AUTO: 27.7 % — SIGNIFICANT CHANGE UP (ref 13–44)
MCHC RBC-ENTMCNC: 30.9 PG — SIGNIFICANT CHANGE UP (ref 27–34)
MCHC RBC-ENTMCNC: 33.6 GM/DL — SIGNIFICANT CHANGE UP (ref 32–36)
MCV RBC AUTO: 91.9 FL — SIGNIFICANT CHANGE UP (ref 80–100)
MONOCYTES # BLD AUTO: 0.24 K/UL — SIGNIFICANT CHANGE UP (ref 0–0.9)
MONOCYTES NFR BLD AUTO: 4.2 % — SIGNIFICANT CHANGE UP (ref 2–14)
NEUTROPHILS # BLD AUTO: 3.73 K/UL — SIGNIFICANT CHANGE UP (ref 1.8–7.4)
NEUTROPHILS NFR BLD AUTO: 64.4 % — SIGNIFICANT CHANGE UP (ref 43–77)
NRBC # BLD: 0 /100 WBCS — SIGNIFICANT CHANGE UP (ref 0–0)
PLATELET # BLD AUTO: 230 K/UL — SIGNIFICANT CHANGE UP (ref 150–400)
POTASSIUM SERPL-MCNC: 4.1 MMOL/L — SIGNIFICANT CHANGE UP (ref 3.5–5.3)
POTASSIUM SERPL-SCNC: 4.1 MMOL/L — SIGNIFICANT CHANGE UP (ref 3.5–5.3)
PROT SERPL-MCNC: 7.1 G/DL — SIGNIFICANT CHANGE UP (ref 6–8.3)
RBC # BLD: 4.92 M/UL — SIGNIFICANT CHANGE UP (ref 3.8–5.2)
RBC # FLD: 11.3 % — SIGNIFICANT CHANGE UP (ref 10.3–14.5)
SODIUM SERPL-SCNC: 142 MMOL/L — SIGNIFICANT CHANGE UP (ref 135–145)
TSH SERPL-MCNC: 1.69 UIU/ML — SIGNIFICANT CHANGE UP (ref 0.27–4.2)
WBC # BLD: 5.78 K/UL — SIGNIFICANT CHANGE UP (ref 3.8–10.5)
WBC # FLD AUTO: 5.78 K/UL — SIGNIFICANT CHANGE UP (ref 3.8–10.5)

## 2020-02-12 PROCEDURE — 90792 PSYCH DIAG EVAL W/MED SRVCS: CPT

## 2020-02-12 RX ORDER — RISPERIDONE 4 MG/1
1 TABLET ORAL
Refills: 0 | Status: DISCONTINUED | OUTPATIENT
Start: 2020-02-12 | End: 2020-02-13

## 2020-02-12 RX ADMIN — Medication 1 MILLIGRAM(S): at 17:48

## 2020-02-12 NOTE — BEHAVIORAL HEALTH ASSESSMENT NOTE - OCCUPATION
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

## 2020-02-12 NOTE — BEHAVIORAL HEALTH ASSESSMENT NOTE - NSBHCHARTREVIEWVS_PSY_A_CORE FT
Vital Signs Last 24 Hrs  T(C): 36.8 (12 Feb 2020 07:45), Max: 37.1 (11 Feb 2020 18:40)  T(F): 98.3 (12 Feb 2020 07:45), Max: 98.8 (11 Feb 2020 18:40)  HR: 111 (12 Feb 2020 07:45) (80 - 111)  BP: 123/77 (12 Feb 2020 07:45) (95/73 - 123/77)  BP(mean): --  RR: 17 (12 Feb 2020 07:45) (16 - 17)  SpO2: 97% (12 Feb 2020 07:45) (97% - 98%)

## 2020-02-12 NOTE — BEHAVIORAL HEALTH ASSESSMENT NOTE - OTHER
chart note from [facility] (non-[facility]) room mate Pt says she  a man originally from [place] in 2017 but is now , and his family found her a room because she was homeless. no notes were seen in [place] even from her admission in the Fall of 2019. pt has had several psychiatric hospitalizations in the past few months.

## 2020-02-12 NOTE — BEHAVIORAL HEALTH ASSESSMENT NOTE - SUMMARY
Pt is a 24 yr old woman who states that she has a hx of Schizoaffective disorder, and has been unemployed, homeless, and was  from her  of 3 years ( in 2017).  She was most recently at [facility] in 12/10-12/27/19 and at [facility] from 10/11/19 to 11/4/2019 and says that the Zyprexa 20mg is not working.  She presented to [facility] with worsening psychosis and suicidal and homicidal ideation threatening to "kill people" because they are threatening to kill her first.  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  She says that she  a man from [place] in 2017 and converted to Mormon, but has  from him now and had been homeless until one week ago, but says that his family was concerned and got her an apartment.  She became concerned that her apartment was being searched and people were "playing games" and trying to "kill me."  She denies any current substance use and says she will not take Zyprexa because it does not work.  She says she was raised by her grandparents in [place], but feels that they would not be able to help her now.  She says that her own parents live in [place] but she does not have their contact information.  She denies current suicidal or homicidal thoughts.  She appears to be responding to internal stimuli, cursing in the hallway and appears paranoid. She refused medications yesterday but was able to discuss switching to Risperidone and Invega Sustenna.

## 2020-02-12 NOTE — BEHAVIORAL HEALTH ASSESSMENT NOTE - HPI (INCLUDE ILLNESS QUALITY, SEVERITY, DURATION, TIMING, CONTEXT, MODIFYING FACTORS, ASSOCIATED SIGNS AND SYMPTOMS)
Pt is a 24 yr old woman who states that she has a hx of Schizoaffective disorder, and has been unemployed, homeless, and was  from her  of 3 years ( in 2017).  She was most recently at [facility] in 12/10-12/27/19 and at [facility] from 10/11/19 to 11/4/2019 and says that the Zyprexa 20mg is not working.  She presented to [facility] with worsening psychosis and suicidal and homicidal ideation threatening to "kill people" because they are threatening to kill her first.  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  She says that she  a man from [place] in 2017 and converted to Judaism, but has  from him now and had been homeless until one week ago, but says that his family was concerned and got her an apartment.  She became concerned that her apartment was being searched and people were "playing games" and trying to "kill me."  She denies any current substance use and says she will not take Zyprexa because it does not work.  She says she was raised by her grandparents in [place], but feels that they would not be able to help her now.  She says that her own parents live in [place] but she does not have their contact information.  She denies current suicidal or homicidal thoughts.  She appears to be responding to internal stimuli, cursing in the hallway and appears paranoid. She refused medications yesterday but was able to discuss switching to Risperidone and Invega Sustenna.

## 2020-02-12 NOTE — BEHAVIORAL HEALTH ASSESSMENT NOTE - NSBHCHARTREVIEWLAB_PSY_A_CORE FT
15.2   5.78  )-----------( 230      ( 12 Feb 2020 12:44 )             45.2   02-12    142  |  106  |  10  ----------------------------<  99  4.1   |  30  |  0.67    Ca    9.0      12 Feb 2020 12:44    TPro  7.1  /  Alb  3.6  /  TBili  0.4  /  DBili  x   /  AST  11  /  ALT  16  /  AlkPhos  62  02-12

## 2020-02-12 NOTE — BEHAVIORAL HEALTH ASSESSMENT NOTE - NSBHCHARTREVIEWINVESTIGATE_PSY_A_CORE FT
Ventricular Rate 84 BPM    Atrial Rate 84 BPM    P-R Interval 132 ms    QRS Duration 78 ms    Q-T Interval 370 ms    QTC Calculation(Bezet) 437 ms    P Axis 35 degrees    R Axis 60 degrees    T Axis 36 degrees    Diagnosis Line Normal sinus rhythm  Normal ECG

## 2020-02-12 NOTE — OCCUPATIONAL THERAPY INITIAL EVALUATION ADULT - PERSONAL SAFETY AND JUDGMENT, REHAB EVAL
lacks appropriate coping mechanisms; listening to command hallucinations and threatening to "kill them.:"/impaired/at risk behaviors demonstrated

## 2020-02-12 NOTE — OCCUPATIONAL THERAPY INITIAL EVALUATION ADULT - PERTINENT HX OF CURRENT PROBLEM, REHAB EVAL
This is a 24 y.o. single, unemployed, homeless female who comes in with worsening AH and paranoid ideation, along with SI and HI. Pt. believes people "are playing games" with her and trying to kill her, threatening that she "will kill them first."  PPHX: Bipolar disorder vs. Schizophrenia; multiple hospitalizations; non-compliant with meds.

## 2020-02-12 NOTE — OCCUPATIONAL THERAPY INITIAL EVALUATION ADULT - ADDITIONAL COMMENTS
PLOF: limited; unable to hold down a job, properly care for her ADL's, or sustain a home, and is homeless.

## 2020-02-13 PROCEDURE — 99232 SBSQ HOSP IP/OBS MODERATE 35: CPT

## 2020-02-13 RX ORDER — OLANZAPINE 15 MG/1
15 TABLET, FILM COATED ORAL AT BEDTIME
Refills: 0 | Status: DISCONTINUED | OUTPATIENT
Start: 2020-02-13 | End: 2020-02-19

## 2020-02-13 RX ORDER — OLANZAPINE 15 MG/1
5 TABLET, FILM COATED ORAL ONCE
Refills: 0 | Status: COMPLETED | OUTPATIENT
Start: 2020-02-13 | End: 2020-02-13

## 2020-02-13 RX ADMIN — Medication 1 MILLIGRAM(S): at 19:28

## 2020-02-13 RX ADMIN — Medication 1 MILLIGRAM(S): at 13:28

## 2020-02-13 RX ADMIN — OLANZAPINE 15 MILLIGRAM(S): 15 TABLET, FILM COATED ORAL at 20:34

## 2020-02-13 NOTE — PROGRESS NOTE BEHAVIORAL HEALTH - NSBHFUPINTERVALHXFT_PSY_A_CORE
Pt has been refusing medication since her admission. She initially said she did not want Zyprexa then she said she would say Risperidone and Invega Sustenna. She now says she does not want the Risperidone and would take Zyprexa.  Pt is lying in bed with her face turned to the wall, refusing to speak or to attend groups and activities.

## 2020-02-14 LAB
APPEARANCE UR: CLEAR — SIGNIFICANT CHANGE UP
BACTERIA # UR AUTO: ABNORMAL
BILIRUB UR-MCNC: NEGATIVE — SIGNIFICANT CHANGE UP
COLOR SPEC: YELLOW — SIGNIFICANT CHANGE UP
DIFF PNL FLD: NEGATIVE — SIGNIFICANT CHANGE UP
EPI CELLS # UR: SIGNIFICANT CHANGE UP
GLUCOSE UR QL: NEGATIVE MG/DL — SIGNIFICANT CHANGE UP
KETONES UR-MCNC: NEGATIVE — SIGNIFICANT CHANGE UP
LEUKOCYTE ESTERASE UR-ACNC: ABNORMAL
NITRITE UR-MCNC: NEGATIVE — SIGNIFICANT CHANGE UP
PH UR: 5 — SIGNIFICANT CHANGE UP (ref 5–8)
PROT UR-MCNC: NEGATIVE MG/DL — SIGNIFICANT CHANGE UP
SP GR SPEC: 1.02 — SIGNIFICANT CHANGE UP (ref 1.01–1.02)
UROBILINOGEN FLD QL: NEGATIVE MG/DL — SIGNIFICANT CHANGE UP
WBC UR QL: SIGNIFICANT CHANGE UP

## 2020-02-14 PROCEDURE — 99231 SBSQ HOSP IP/OBS SF/LOW 25: CPT

## 2020-02-14 RX ADMIN — OLANZAPINE 15 MILLIGRAM(S): 15 TABLET, FILM COATED ORAL at 22:14

## 2020-02-14 NOTE — PROGRESS NOTE BEHAVIORAL HEALTH - NSBHFUPINTERVALHXFT_PSY_A_CORE
Pt has been refusing medication since her admission. She initially said she did not want Zyprexa then she said she would say Risperidone and Invega Sustenna. Pt did take her Zyprexa.  Pt is lying in bed with her face turned to the wall, refusing to speak or to attend groups and activities.

## 2020-02-15 PROCEDURE — 99231 SBSQ HOSP IP/OBS SF/LOW 25: CPT

## 2020-02-15 NOTE — PROGRESS NOTE BEHAVIORAL HEALTH - NSBHFUPINTERVALHXFT_PSY_A_CORE
Patient seen, evaluated and chart reviewed. Pt has been refusing medication since her admission. Patient reportedly has been non-compliant with treatment. Today she engages with the undersigned minimizing her complaints and raising no new ones.

## 2020-02-16 RX ADMIN — Medication 1 MILLIGRAM(S): at 17:27

## 2020-02-16 RX ADMIN — Medication 1 MILLIGRAM(S): at 07:56

## 2020-02-17 RX ADMIN — OLANZAPINE 15 MILLIGRAM(S): 15 TABLET, FILM COATED ORAL at 20:37

## 2020-02-17 RX ADMIN — Medication 1 MILLIGRAM(S): at 18:23

## 2020-02-17 RX ADMIN — Medication 1 MILLIGRAM(S): at 08:02

## 2020-02-17 NOTE — DIETITIAN INITIAL EVALUATION ADULT. - OTHER INFO
25yo female with pphx of Schizoaffective disorder, and has been unemployed, homeless, and was  from her  of 3 years, with hx previous behavioral health admissions, admitted with worsening psychosis and suicidal and homicidal ideation.  Pt seen for nutrition assessment secondary to LOS policy.  Pt on regular diet (lacto-ovo vegetarian) with active order for double portions as of 2/12.  Reports good appetite and intake; preferences updated.

## 2020-02-17 NOTE — DIETITIAN INITIAL EVALUATION ADULT. - PHYSICAL APPEARANCE
obese/BMI 39.2, class II (per stated ht 5'6", adm wt 243#); reports wt gain since being homeless secondary to sometimes lack of healthful food choices

## 2020-02-18 PROCEDURE — 99233 SBSQ HOSP IP/OBS HIGH 50: CPT

## 2020-02-18 RX ORDER — HALOPERIDOL DECANOATE 100 MG/ML
5 INJECTION INTRAMUSCULAR EVERY 6 HOURS
Refills: 0 | Status: DISCONTINUED | OUTPATIENT
Start: 2020-02-18 | End: 2020-03-27

## 2020-02-18 RX ADMIN — Medication 1 MILLIGRAM(S): at 17:33

## 2020-02-18 NOTE — PROGRESS NOTE BEHAVIORAL HEALTH - NSBHFUPINTERVALHXFT_PSY_A_CORE
Patient seen, evaluated and chart reviewed. Case discussed in tx team meeting and with Dr. Beasley.  Patient has been Rx compliant and reports she is feeling better.  Patient is isolating to room and declined to go to group this afternoon.  She reports her mood is "normal" and she does not feel depressed.  She is verbal and makes fair eye contact.  Denies any SI or HI or any psychotic sx.  No Rx SE or sx TD/EPS are noted or reported.

## 2020-02-19 PROCEDURE — 99233 SBSQ HOSP IP/OBS HIGH 50: CPT

## 2020-02-19 RX ORDER — OLANZAPINE 15 MG/1
10 TABLET, FILM COATED ORAL AT BEDTIME
Refills: 0 | Status: DISCONTINUED | OUTPATIENT
Start: 2020-02-19 | End: 2020-02-24

## 2020-02-19 RX ORDER — OLANZAPINE 15 MG/1
5 TABLET, FILM COATED ORAL ONCE
Refills: 0 | Status: COMPLETED | OUTPATIENT
Start: 2020-02-19 | End: 2020-02-19

## 2020-02-19 RX ORDER — OLANZAPINE 15 MG/1
5 TABLET, FILM COATED ORAL DAILY
Refills: 0 | Status: DISCONTINUED | OUTPATIENT
Start: 2020-02-20 | End: 2020-03-04

## 2020-02-19 RX ADMIN — OLANZAPINE 5 MILLIGRAM(S): 15 TABLET, FILM COATED ORAL at 12:26

## 2020-02-19 RX ADMIN — OLANZAPINE 10 MILLIGRAM(S): 15 TABLET, FILM COATED ORAL at 20:50

## 2020-02-19 RX ADMIN — Medication 1 MILLIGRAM(S): at 11:55

## 2020-02-19 NOTE — PROGRESS NOTE BEHAVIORAL HEALTH - NSBHFUPINTERVALHXFT_PSY_A_CORE
Patient seen, evaluated and chart reviewed. Case discussed in tx team meeting and with Dr. Beasley.  Patient has been Rx compliant and reports she is feeling better, but then last night refused her Zyprexa due to feeling that the nurse "was against me and didn't like me"  Verbalizes other paranoid thoughts about staff at times. Patient was willing to take Rx this am and did take Zyprexa.   She reports her mood is "normal" and she does not feel depressed.  She is verbal and makes fair eye contact.  Denies any SI or HI or any psychotic sx.  No Rx SE or sx TD/EPS are noted or reported. Change Zyprexa to 5mg qam and 10mg hs

## 2020-02-20 PROCEDURE — 93010 ELECTROCARDIOGRAM REPORT: CPT

## 2020-02-20 PROCEDURE — 99232 SBSQ HOSP IP/OBS MODERATE 35: CPT

## 2020-02-20 RX ADMIN — Medication 1 MILLIGRAM(S): at 08:29

## 2020-02-20 RX ADMIN — OLANZAPINE 10 MILLIGRAM(S): 15 TABLET, FILM COATED ORAL at 21:17

## 2020-02-20 RX ADMIN — OLANZAPINE 5 MILLIGRAM(S): 15 TABLET, FILM COATED ORAL at 08:29

## 2020-02-20 RX ADMIN — Medication 1 MILLIGRAM(S): at 16:50

## 2020-02-20 NOTE — PROGRESS NOTE BEHAVIORAL HEALTH - NSBHFUPINTERVALCCFT_PSY_A_CORE
" I don't go to groups because I don't like to be around a lot of people.  I think they don't like me"

## 2020-02-20 NOTE — PROGRESS NOTE BEHAVIORAL HEALTH - NSBHFUPINTERVALHXFT_PSY_A_CORE
Patient seen, evaluated and chart reviewed. Case discussed in tx team meeting and with Dr. Beasley.  Patient has been Rx compliant since yesterday, and reports she feels ok.  Housing interview was cancelled due to interviewer unable to come to meet patient, but patient is tolerating this well.  Verbalizes other paranoid thoughts about staff at times.    She continues to report her mood is "normal" and she does not feel depressed.  She is verbal and makes fair eye contact.  Denies any SI or HI or any psychotic sx.  No Rx SE or sx TD/EPS are noted or reported.

## 2020-02-21 DIAGNOSIS — Z71.89 OTHER SPECIFIED COUNSELING: ICD-10-CM

## 2020-02-21 PROCEDURE — 99232 SBSQ HOSP IP/OBS MODERATE 35: CPT

## 2020-02-21 RX ADMIN — Medication 1 MILLIGRAM(S): at 12:54

## 2020-02-21 RX ADMIN — OLANZAPINE 5 MILLIGRAM(S): 15 TABLET, FILM COATED ORAL at 08:34

## 2020-02-21 RX ADMIN — OLANZAPINE 10 MILLIGRAM(S): 15 TABLET, FILM COATED ORAL at 20:40

## 2020-02-21 NOTE — PROGRESS NOTE BEHAVIORAL HEALTH - NSBHFUPINTERVALHXFT_PSY_A_CORE
Patient seen, evaluated and chart reviewed. Case discussed in tx team meeting and with Dr. Beasley.  Patient has been Rx compliant, and reports she feels ok.  Housing interview was cancelled due to interviewer unable to come to meet patient, but patient is tolerating this well. Continue to await housing staff to reschedule interview.  Verbalizes less paranoid thoughts about staff .    She continues to report her mood is "normal" and she does not feel depressed.  She is verbal and makes fair eye contact.  Affect is brighter and she is able to discuss her wish for housing.  Denies any SI or HI or any psychotic sx.  No Rx SE or sx TD/EPS are noted or reported.

## 2020-02-22 RX ADMIN — OLANZAPINE 10 MILLIGRAM(S): 15 TABLET, FILM COATED ORAL at 20:16

## 2020-02-22 RX ADMIN — OLANZAPINE 5 MILLIGRAM(S): 15 TABLET, FILM COATED ORAL at 08:16

## 2020-02-22 RX ADMIN — Medication 1 MILLIGRAM(S): at 17:07

## 2020-02-23 RX ADMIN — OLANZAPINE 5 MILLIGRAM(S): 15 TABLET, FILM COATED ORAL at 08:56

## 2020-02-23 RX ADMIN — OLANZAPINE 10 MILLIGRAM(S): 15 TABLET, FILM COATED ORAL at 20:08

## 2020-02-23 RX ADMIN — Medication 1 MILLIGRAM(S): at 08:56

## 2020-02-24 PROCEDURE — 99233 SBSQ HOSP IP/OBS HIGH 50: CPT

## 2020-02-24 RX ORDER — OLANZAPINE 15 MG/1
1 TABLET, FILM COATED ORAL
Qty: 30 | Refills: 0
Start: 2020-02-24 | End: 2020-03-24

## 2020-02-24 RX ORDER — OLANZAPINE 15 MG/1
10 TABLET, FILM COATED ORAL
Refills: 0 | Status: DISCONTINUED | OUTPATIENT
Start: 2020-02-24 | End: 2020-03-04

## 2020-02-24 RX ADMIN — OLANZAPINE 5 MILLIGRAM(S): 15 TABLET, FILM COATED ORAL at 08:47

## 2020-02-24 RX ADMIN — Medication 1 MILLIGRAM(S): at 08:47

## 2020-02-24 RX ADMIN — Medication 1 MILLIGRAM(S): at 15:21

## 2020-02-24 RX ADMIN — OLANZAPINE 10 MILLIGRAM(S): 15 TABLET, FILM COATED ORAL at 17:47

## 2020-02-24 NOTE — DISCHARGE NOTE BEHAVIORAL HEALTH - NSBHDCCASEMGRFT_PSY_A_CORE
[facility] intensive care manager Rosalee [phone] On license of [facility] intensive  Rosalee @ [phone];  Health Home care coordinator Sherwin @ [phone]

## 2020-02-24 NOTE — DISCHARGE NOTE BEHAVIORAL HEALTH - NS AS DC DISCHARGE ACCOMPANY
Traveling alone via [employer] (Medicaid) transportation -- confirmation #[id number] w/ transportation provider ASAP [place]

## 2020-02-24 NOTE — DISCHARGE NOTE BEHAVIORAL HEALTH - NSBHDCNOCAREGVRFT_PSY_A_CORE
pt declined to provide caregiver Patient declined to provide contact information for an emergency contact or designated caregiver.

## 2020-02-24 NOTE — DISCHARGE NOTE BEHAVIORAL HEALTH - NSBHDCPLANTRANSMIT_PSY_A_CORE
Carina Edmonds, LMSW/fax Carina Edmonds, [facility] - phone [phone]/fax/ (specify name)

## 2020-02-24 NOTE — DISCHARGE NOTE BEHAVIORAL HEALTH - FAMILY HISTORY OF PSYCHIATRIC ILLNESS
has no contact with parents who live in [place]  reports raised by grandmother   to man from [place], but they are .

## 2020-02-24 NOTE — DISCHARGE NOTE BEHAVIORAL HEALTH - NSBHDCREFEROTHERFT_PSY_A_CORE
KO Irvin [phone]  [facility] intensive care manager Rosalee [phone]  health [facility] Sherwin Keyehsan [phone]  completed referral for Assisted Outpatient Treatment (AOT) and helped w/ securing community residence bed at the [facility].

## 2020-02-24 NOTE — DISCHARGE NOTE BEHAVIORAL HEALTH - NSBHDCPURPOSE1FT_PSY_A_CORE
[facility] Intake Intake appointment over the phone for outpatient mental health treatment

## 2020-02-24 NOTE — DISCHARGE NOTE BEHAVIORAL HEALTH - NSBHDCCRISISPLAN3FT_PSY_A_CORE
Utilize healthy coping skills learned on the unit like drawing, coloring, journaling, beading, listening to music, aromatherapy, chair yoga, meditation, talking to supports, creating daily structure.

## 2020-02-24 NOTE — DISCHARGE NOTE BEHAVIORAL HEALTH - NSBHDCHOUSING_PSY_A_CORE
community residence.../[facility], located at [address] [place] [address], [address], [place] [address]/[facility] residence...

## 2020-02-24 NOTE — DISCHARGE NOTE BEHAVIORAL HEALTH - HPI (INCLUDE ILLNESS QUALITY, SEVERITY, DURATION, TIMING, CONTEXT, MODIFYING FACTORS, ASSOCIATED SIGNS AND SYMPTOMS)
: Pt is a 24 yr old woman who states that she has a hx of Schizoaffective disorder, and has been unemployed, homeless, and was  from her  of 3 years ( in 2017).  She was most recently at [facility] in 12/10-12/27/19 and at [facility] from 10/11/19 to 11/4/2019 and says that the Zyprexa 20mg is not working.  She presented to [facility] with worsening psychosis and suicidal and homicidal ideation threatening to "kill people" because they are threatening to kill her first.  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  She says that she  a man from [place] in 2017 and converted to Yarsanism, but has  from him now and had been homeless until one week ago, but says that his family was concerned and got her an apartment.  She became concerned that her apartment was being searched and people were "playing games" and trying to "kill me."  She denies any current substance use and says she will not take Zyprexa because it does not work.  She says she was raised by her grandparents in [place], but feels that they would not be able to help her now.  She says that her own parents live in [place] but she does not have their contact information.  She denies current suicidal or homicidal thoughts.  She appears to be responding to internal stimuli, cursing in the hallway and appears paranoid. She refused medications yesterday but was able to discuss switching to Risperidone and Invega Sustenna.

## 2020-02-24 NOTE — DISCHARGE NOTE BEHAVIORAL HEALTH - MEDICATION SUMMARY - MEDICATIONS TO STOP TAKING
I will STOP taking the medications listed below when I get home from the hospital:    OLANZapine 20 mg oral tablet, disintegrating  -- 1 tab(s) by mouth once a day

## 2020-02-24 NOTE — DISCHARGE NOTE BEHAVIORAL HEALTH - NSBHDCDXVALIDYESFT_PSY_A_CORE
patient exhibited/reported psychotic sx and mood was labile at times patient exhibited/reported psychotic sx and mood was labile at times but became more stable

## 2020-02-24 NOTE — DISCHARGE NOTE BEHAVIORAL HEALTH - NSBHDCTHERAPYFT_PSY_A_CORE
Individual and Group Therapy    OT   Art Therapy    patient only began participating at the end of her hospital stay Individual and Group Therapy    OT   Art Therapy    participated more towards the end of her stay

## 2020-02-24 NOTE — DISCHARGE NOTE BEHAVIORAL HEALTH - NSBHDCCONDITIONFT_PSY_A_CORE
Patient is in behavioral control and is stable Patient is in behavioral control and is stable.  Patient to be followed by AOT

## 2020-02-24 NOTE — DISCHARGE NOTE BEHAVIORAL HEALTH - NSBHDCVIOLPROTECTFT_PSY_A_CORE
no current delusional thoughts    Patient has never acted on the thoughts  Patient verbalized motivation for Rx and aftercare adherence. no current delusional thoughts    Patient has never acted on the thoughts  Patient verbalized motivation for Rx and aftercare adherence.  Patient to be on AOT

## 2020-02-24 NOTE — DISCHARGE NOTE BEHAVIORAL HEALTH - NSBHDCADDR1FT_A_CORE
[facility] [place] [address] [address] [place] [address]  Appt will be over the phone. [address] [place] [address]  fax [phone]  **Please be aware that your appointment will take place over the phone due to the current COVID-19 outbreak.

## 2020-02-24 NOTE — DISCHARGE NOTE BEHAVIORAL HEALTH - NSBHDCVIOLSAFETYFT_PSY_A_CORE
Pateint reports if she has thoughts of harming self or others she will: go to the nearest ED, call 911, or call the  Crisis Center or the Suicide Hotline (was given those phone numbers) or call or go to the [facility] in Crisis Center (was given the address and phone number) Patient reports if she has thoughts of harming self or others she will: tell staff in her residence or let AOT team know, or go to the nearest ED, call 911, or call the  Crisis Center or the Suicide Hotline (was given those phone numbers) or call or go to the [facility] in Crisis Center (was given the address and phone number)

## 2020-02-24 NOTE — DISCHARGE NOTE BEHAVIORAL HEALTH - MEDICATION SUMMARY - MEDICATIONS TO TAKE
I will START or STAY ON the medications listed below when I get home from the hospital:    OLANZapine 5 mg oral tablet  -- 1 tab(s) by mouth once a day x 30 days  for psychotic sx Meds 2 beds  -- Indication: For Schizoaffective disorder, unspecified type    OLANZapine 10 mg oral tablet  -- 1 tab(s) by mouth once a day (at bedtime) x 30 days  for psychotic sx    [id number]  -- Indication: For Schizoaffective disorder, unspecified type I will START or STAY ON the medications listed below when I get home from the hospital:    LORazepam 1 mg oral tablet  -- 1 tab(s) by mouth 2 times a day x 30 days  at 9am and 6pm for anxiety MDD:2mg  -- Indication: For anxiety    OLANZapine 10 mg oral tablet  -- 1 tab(s) by mouth once a day x 30 days  at 6pm for psychosis   -- Indication: For Schizoaffective disorder, unspecified type    nystatin-triamcinolone 100,000 units/g-0.1% topical cream  -- 1 application on skin 2 times a day for rash   continue until seen by outpatient provider  -- Indication: For rash I will START or STAY ON the medications listed below when I get home from the hospital:    LORazepam 1 mg oral tablet  -- 1 tab(s) by mouth 2 times a day x 30 days  at 9am and 6pm for anxiety MDD:2mg  -- Indication: For anxiety    OLANZapine 10 mg oral tablet  -- 1 tab(s) by mouth once a day x 30 days  at 6pm for psychosis   -- Indication: For Schizoaffective disorder, unspecified type

## 2020-02-24 NOTE — PROGRESS NOTE BEHAVIORAL HEALTH - NSBHFUPINTERVALHXFT_PSY_A_CORE
Patient seen, evaluated and chart reviewed. Case discussed in tx team meeting and with Dr. Beasley.  Patient has been Rx compliant, and reports she feels ok.  She reports she wants to take her evening dose of Zyprexa earlier, as she likes to go to sleep earlier.  Housing interview was again cancelled due to interviewer unable to come to meet patient, but patient continues to tolerate this well.  Verbalizes no paranoid thoughts about staff . No delusional thoughts are elicited.  Patient was able to go to and to tolerate groups well today.    She continues to report her mood is "normal" and she does not feel depressed.  She is verbal and makes fair eye contact.  Affect is brighter and she is able to discuss her living situation, and that she is ok with going to a shelter and has been to shelters in the past and was ok with it.  Denies any SI or HI or any psychotic sx.  No Rx SE or sx TD/EPS are noted or reported. Patient seen, evaluated and chart reviewed. Case discussed in tx team meeting and with Dr. Beasley.  Patient has been Rx compliant, and reports she feels ok.  She reports she wants to take her evening dose of Zyprexa earlier, as she likes to go to sleep earlier.  Housing interview was again cancelled due to interviewer unable to come to meet patient, but patient continues to tolerate this well.  Verbalizes no paranoid thoughts about staff . No delusional thoughts are elicited.  Patient was able to go to and to tolerate groups well today.    She continues to report her mood is "normal" and she does not feel depressed.  She is verbal and makes fair eye contact.  Affect is brighter and she is able to discuss her living situation, and that she is ok with going to a shelter and has been to shelters in the past and was ok with it.  Denies any SI or HI or any psychotic sx.  No Rx SE or sx TD/EPS are noted or reported. Patient was instructed that she can call people for help if she feels unsafe or needs help after DC.   Pateint reports if she has delusions or feels unsafe after DC she will:  go to the ED, call 911, or call the  Crisis Center or the Suicide Hotline (was given those numbers) or call or go to the [facility] in crisis center (was given address and phone number)

## 2020-02-24 NOTE — DISCHARGE NOTE BEHAVIORAL HEALTH - NSBHDCVIOLFCTRMIT_PSY_A_CORE
Patient is pleasant and verbal and able to make her needs known. She has a hx of employment, and reports she has supportive family members.  She reports motivation for Rx and aftercare adherence Patient is pleasant and verbal and able to make her needs known. She has a hx of employment, and reports she has supportive family members.  She reports motivation for Rx and aftercare adherence.  She reports she feels being on AOT will help her

## 2020-02-24 NOTE — DISCHARGE NOTE BEHAVIORAL HEALTH - DISCHARGE TO
[facility], located at [address] [place] [address], [address], [place] [address]/Home

## 2020-02-25 PROCEDURE — 99233 SBSQ HOSP IP/OBS HIGH 50: CPT

## 2020-02-25 RX ADMIN — OLANZAPINE 10 MILLIGRAM(S): 15 TABLET, FILM COATED ORAL at 17:41

## 2020-02-25 RX ADMIN — OLANZAPINE 5 MILLIGRAM(S): 15 TABLET, FILM COATED ORAL at 08:32

## 2020-02-25 RX ADMIN — Medication 1 MILLIGRAM(S): at 08:31

## 2020-02-25 NOTE — PROGRESS NOTE BEHAVIORAL HEALTH - NSBHFUPINTERVALHXFT_PSY_A_CORE
Patient seen, evaluated and chart reviewed. Case discussed in tx team meeting and with Dr. Beasley. No significant interval events are reported.  Patient has been Rx compliant, and reports she feels ok.   Housing interview was rescheduled for tomorrow.   Verbalizes no paranoid thoughts about staff . No delusional thoughts are elicited.   She continues to report her mood is "good" and she does not feel depressed.  She is verbal and makes fair eye contact.  Affect continues brighter and she is able to discuss her living situation, and that she hopes to get housing.   Denies any SI or HI or any psychotic sx.  No Rx SE or sx TD/EPS are noted or reported.

## 2020-02-25 NOTE — PROGRESS NOTE ADULT - SUBJECTIVE AND OBJECTIVE BOX
Progress: pt responded well to psych treatment.    Allergies    No Known Allergies    Intolerances            MEDICATIONS  (STANDING):  MEDICATIONS  (STANDING):  OLANZapine 10 milliGRAM(s) Oral <User Schedule>  OLANZapine 5 milliGRAM(s) Oral daily    MEDICATIONS  (PRN):  haloperidol    Injectable 5 milliGRAM(s) IntraMuscular every 6 hours PRN severe agitation  LORazepam     Tablet 1 milliGRAM(s) Oral every 6 hours PRN anxiety  LORazepam   Injectable 2 milliGRAM(s) IntraMuscular every 6 hours PRN severe agitation        Vital Signs Last 24 Hrs  T(C): 36.3 (25 Feb 2020 08:00), Max: 36.3 (25 Feb 2020 08:00)  T(F): 97.3 (25 Feb 2020 08:00), Max: 97.3 (25 Feb 2020 08:00)  HR: 81 (25 Feb 2020 08:00) (81 - 120)  BP: 119/79 (25 Feb 2020 08:00) (117/77 - 119/79)  RR: 17 (24 Feb 2020 16:42) (17 - 17)  SpO2: 96% (24 Feb 2020 16:42) (96% - 96%)      ROS ; no c/o constipation, no dysuria, insomnia improved .      PHYSICAL EXAM:  GENERAL: NAD, well-groomed, well-developed  HEAD:  Atraumatic, Normocephalic  EYES: EOMI, PERRLA, conjunctiva and sclera clear  ENMT: No tonsillar erythema, exudates, or enlargement; Moist mucous membranes .  NECK: Supple, No JVD, Normal thyroid  CHEST/LUNG: Clear to auscultation bilaterally; No rales, rhonchi, wheezing, or rubs  HEART: Regular rate and rhythm; No murmurs, rubs, or gallops  ABDOMEN: Soft, Nontender, Nondistended; Bowel sounds present  EXTREMITIES:  2+ Peripheral Pulses, No clubbing, cyanosis, or edema  LYMPH: No lymphadenopathy noted  SKIN: No rashes or lesions    LABS:        Thyroid Stimulating Hormone, Serum: 1.69 uIU/mL (02-12-20 @ 15:43)        RADIOLOGY & ADDITIONAL TESTS: EKG ;NSR ,no acute st-t changes.

## 2020-02-25 NOTE — PROGRESS NOTE ADULT - ASSESSMENT
insomnia ; improved  fatigue ; improved   psych disorder ; improved, continue treatment as per psych advise .

## 2020-02-26 PROCEDURE — 99232 SBSQ HOSP IP/OBS MODERATE 35: CPT

## 2020-02-26 RX ADMIN — OLANZAPINE 10 MILLIGRAM(S): 15 TABLET, FILM COATED ORAL at 17:30

## 2020-02-26 RX ADMIN — Medication 1 MILLIGRAM(S): at 11:56

## 2020-02-26 RX ADMIN — OLANZAPINE 5 MILLIGRAM(S): 15 TABLET, FILM COATED ORAL at 11:35

## 2020-02-26 NOTE — PROGRESS NOTE BEHAVIORAL HEALTH - NSBHFUPINTERVALHXFT_PSY_A_CORE
Patient seen, evaluated and chart reviewed. Case discussed in tx team meeting and with Dr. Beasley. No significant interval events are reported.  Patient has been Rx compliant, and reports she feels ok.   Housing interview was completed, housing staff reports patient needs to be on AOT, and SW will apply for this for patient.   Verbalizes no paranoid thoughts about staff . No delusional thoughts are elicited.   She reports she is anxious, but she does not feel depressed.  She is verbal and makes fair eye contact.  Affect continues brighter and she is able to discuss her living situation, and that she hopes to get housing.   Denies any SI or HI or any psychotic sx.  No Rx SE or sx TD/EPS are noted or reported.

## 2020-02-27 PROCEDURE — 99231 SBSQ HOSP IP/OBS SF/LOW 25: CPT

## 2020-02-27 RX ORDER — HYDROXYZINE HCL 10 MG
25 TABLET ORAL EVERY 6 HOURS
Refills: 0 | Status: DISCONTINUED | OUTPATIENT
Start: 2020-02-27 | End: 2020-03-10

## 2020-02-27 RX ADMIN — Medication 25 MILLIGRAM(S): at 17:25

## 2020-02-27 RX ADMIN — OLANZAPINE 10 MILLIGRAM(S): 15 TABLET, FILM COATED ORAL at 17:42

## 2020-02-27 RX ADMIN — Medication 1 MILLIGRAM(S): at 07:50

## 2020-02-27 RX ADMIN — OLANZAPINE 5 MILLIGRAM(S): 15 TABLET, FILM COATED ORAL at 08:02

## 2020-02-27 NOTE — PROGRESS NOTE BEHAVIORAL HEALTH - NSBHFUPINTERVALHXFT_PSY_A_CORE
Patient seen, evaluated and chart reviewed. Case discussed in tx team meeting and with Dr. Beasley. No significant interval events are reported.  Patient has been Rx compliant, and reports she feels ok, but does not feel like talking today.   Housing interview was completed, housing staff reports patient needs to be on AOT, and SW is applying for this for patient.   Verbalizes no paranoid thoughts about staff . No delusional thoughts are elicited.   She continues to report she is anxious, but she does not feel depressed. .   Denies any SI or HI or any psychotic sx.  No Rx SE or sx TD/EPS are noted or reported.

## 2020-02-28 PROCEDURE — 99232 SBSQ HOSP IP/OBS MODERATE 35: CPT

## 2020-02-28 RX ORDER — HALOPERIDOL DECANOATE 100 MG/ML
5 INJECTION INTRAMUSCULAR EVERY 6 HOURS
Refills: 0 | Status: DISCONTINUED | OUTPATIENT
Start: 2020-02-28 | End: 2020-03-27

## 2020-02-28 RX ADMIN — Medication 1 MILLIGRAM(S): at 08:35

## 2020-02-28 RX ADMIN — OLANZAPINE 5 MILLIGRAM(S): 15 TABLET, FILM COATED ORAL at 08:25

## 2020-02-28 RX ADMIN — OLANZAPINE 10 MILLIGRAM(S): 15 TABLET, FILM COATED ORAL at 17:39

## 2020-02-28 NOTE — PROGRESS NOTE BEHAVIORAL HEALTH - NSBHFUPINTERVALHXFT_PSY_A_CORE
Patient seen, evaluated and chart reviewed. Case discussed in tx team meeting and with Dr. Beasley. No significant interval events are reported.  Patient has been Rx compliant, and reports she feels ok.  Housing interview was completed, housing staff reports patient needs to be on AOT, and SW is applying for this for patient.   Patient declines to discuss housing or AOT with this writer, as she reports she only wants to discuss that with her SW. Verbalizes no paranoid thoughts about staff . No delusional thoughts are elicited.   She continues to report she is anxious, but she does not feel depressed. .   Denies any SI or HI or any psychotic sx.  No Rx SE or sx TD/EPS are noted or reported.

## 2020-02-29 RX ADMIN — OLANZAPINE 10 MILLIGRAM(S): 15 TABLET, FILM COATED ORAL at 17:39

## 2020-02-29 RX ADMIN — OLANZAPINE 5 MILLIGRAM(S): 15 TABLET, FILM COATED ORAL at 08:12

## 2020-02-29 RX ADMIN — Medication 1 MILLIGRAM(S): at 08:11

## 2020-03-01 ENCOUNTER — OUTPATIENT (OUTPATIENT)
Dept: OUTPATIENT SERVICES | Facility: HOSPITAL | Age: 25
LOS: 1 days | End: 2020-03-01
Payer: MEDICAID

## 2020-03-01 RX ADMIN — OLANZAPINE 10 MILLIGRAM(S): 15 TABLET, FILM COATED ORAL at 17:46

## 2020-03-01 RX ADMIN — Medication 1 MILLIGRAM(S): at 15:26

## 2020-03-01 RX ADMIN — OLANZAPINE 5 MILLIGRAM(S): 15 TABLET, FILM COATED ORAL at 08:17

## 2020-03-01 RX ADMIN — Medication 25 MILLIGRAM(S): at 17:43

## 2020-03-02 PROCEDURE — 99232 SBSQ HOSP IP/OBS MODERATE 35: CPT

## 2020-03-02 RX ADMIN — Medication 1 MILLIGRAM(S): at 16:42

## 2020-03-02 RX ADMIN — OLANZAPINE 10 MILLIGRAM(S): 15 TABLET, FILM COATED ORAL at 19:06

## 2020-03-02 RX ADMIN — OLANZAPINE 5 MILLIGRAM(S): 15 TABLET, FILM COATED ORAL at 08:19

## 2020-03-02 RX ADMIN — Medication 1 MILLIGRAM(S): at 08:18

## 2020-03-02 NOTE — PROGRESS NOTE BEHAVIORAL HEALTH - NSBHFUPINTERVALHXFT_PSY_A_CORE
Patient seen, evaluated and chart reviewed. Case discussed in tx team meeting and with Dr. Beasley. No significant interval events are reported.  Patient has been Rx compliant, and reports she feels ok.  Reports went to some groups on the unit and liked them.  Verbalizes no paranoid thoughts about staff . No delusional thoughts are elicited.   She continues to report she is anxious, but she does not feel depressed. .   Denies any SI or HI or any psychotic sx.  No Rx SE or sx TD/EPS are noted or reported. Continue to await AOT

## 2020-03-03 PROCEDURE — 99233 SBSQ HOSP IP/OBS HIGH 50: CPT

## 2020-03-03 RX ADMIN — Medication 25 MILLIGRAM(S): at 17:57

## 2020-03-03 RX ADMIN — OLANZAPINE 5 MILLIGRAM(S): 15 TABLET, FILM COATED ORAL at 12:29

## 2020-03-03 RX ADMIN — OLANZAPINE 10 MILLIGRAM(S): 15 TABLET, FILM COATED ORAL at 17:46

## 2020-03-03 RX ADMIN — Medication 1 MILLIGRAM(S): at 12:48

## 2020-03-03 NOTE — PROGRESS NOTE BEHAVIORAL HEALTH - NSBHFUPINTERVALHXFT_PSY_A_CORE
Patient seen, evaluated and chart reviewed. Case discussed in tx team meeting and with Dr. Beasley. No significant interval events are reported.  Patient has been Rx compliant, although she refused am Zyprexa, then asked for it later, and reports she feels ok.  Reports has gone to some groups on the unit and liked them.  Staff continues to encourage patient to go to groups and socialize with peers.  Verbalizes no paranoid thoughts about staff . No delusional thoughts are elicited.   She continues to report she is anxious, but she does not feel depressed. .   Denies any SI or HI or any psychotic sx.  No Rx SE or sx TD/EPS are noted or reported. Continue to await AOT

## 2020-03-04 PROCEDURE — 99232 SBSQ HOSP IP/OBS MODERATE 35: CPT

## 2020-03-04 RX ORDER — OLANZAPINE 15 MG/1
15 TABLET, FILM COATED ORAL
Refills: 0 | Status: DISCONTINUED | OUTPATIENT
Start: 2020-03-04 | End: 2020-03-09

## 2020-03-04 RX ADMIN — OLANZAPINE 15 MILLIGRAM(S): 15 TABLET, FILM COATED ORAL at 19:03

## 2020-03-04 RX ADMIN — Medication 1 MILLIGRAM(S): at 15:02

## 2020-03-04 NOTE — PROGRESS NOTE BEHAVIORAL HEALTH - NSBHFUPINTERVALHXFT_PSY_A_CORE
Patient seen, evaluated and chart reviewed. Case discussed in tx team meeting and with Dr. Nicole. . No significant interval events are reported, except patient refused am Zyprexa, but was not able to say why she refused it.  Reports will take hs dose tonight.     Staff continues to encourage patient to go to groups and socialize with peers.  Verbalizes no paranoid thoughts about staff . No delusional thoughts are elicited.   She continues to report she is anxious, but she does not feel depressed. .   Denies any SI or HI or any psychotic sx.  No Rx SE or sx TD/EPS are noted or reported. Continue to await AOT

## 2020-03-05 PROCEDURE — 99231 SBSQ HOSP IP/OBS SF/LOW 25: CPT

## 2020-03-05 RX ADMIN — Medication 1 MILLIGRAM(S): at 07:45

## 2020-03-05 RX ADMIN — OLANZAPINE 15 MILLIGRAM(S): 15 TABLET, FILM COATED ORAL at 17:30

## 2020-03-05 NOTE — PROGRESS NOTE BEHAVIORAL HEALTH - NSBHFUPINTERVALHXFT_PSY_A_CORE
Patient seen, evaluated and chart reviewed. Case discussed in tx team meeting and with Dr. Nicole. . No significant interval events are reported.  Staff continues to encourage patient to go to groups and socialize with peers, and patient did report she went to groups today and found them helpful to her.  Verbalizes no paranoid thoughts about staff . No delusional thoughts are elicited.   She reports she is less anxious, but she does not feel depressed. Asking appropriate questions about AOT.   Denies any SI or HI or any psychotic sx.  No Rx SE or sx TD/EPS are noted or reported. Continue to await AOT,  Reports rash in umbilical area, and fungus on right great toe.  Dr. Garcia to see patient.

## 2020-03-06 PROCEDURE — 93010 ELECTROCARDIOGRAM REPORT: CPT

## 2020-03-06 PROCEDURE — 99232 SBSQ HOSP IP/OBS MODERATE 35: CPT

## 2020-03-06 RX ORDER — SERTRALINE 25 MG/1
25 TABLET, FILM COATED ORAL DAILY
Refills: 0 | Status: DISCONTINUED | OUTPATIENT
Start: 2020-03-07 | End: 2020-03-09

## 2020-03-06 RX ORDER — NYSTATIN/TRIAMCINOLONE ACET
1 OINTMENT (GRAM) TOPICAL
Refills: 0 | Status: DISCONTINUED | OUTPATIENT
Start: 2020-03-06 | End: 2020-03-25

## 2020-03-06 RX ADMIN — OLANZAPINE 15 MILLIGRAM(S): 15 TABLET, FILM COATED ORAL at 17:56

## 2020-03-06 RX ADMIN — Medication 1 MILLIGRAM(S): at 08:12

## 2020-03-06 RX ADMIN — Medication 1 MILLIGRAM(S): at 15:04

## 2020-03-06 NOTE — CHART NOTE - NSCHARTNOTEFT_GEN_A_CORE
Patient noted to be tachycardic on vital signs today to 120s  ECG reviewed; noted to be sinus bradycardic to 58  Thus, vital signs likely incorrectly recorded

## 2020-03-06 NOTE — PROGRESS NOTE ADULT - ASSESSMENT
umbilical rash ; apply antifungal cream   dandruff of scalp ; apply shampoo    insomnia ; improved  fatigue ; improved   psych disorder ; improved, continue treatment as per psych advise .

## 2020-03-06 NOTE — PROGRESS NOTE ADULT - SUBJECTIVE AND OBJECTIVE BOX
Progress: c/o umbilical rash    Allergies    No Known Allergies    Intolerances    MEDICATIONS  (STANDING):  MEDICATIONS  (STANDING):  OLANZapine 10 milliGRAM(s) Oral <User Schedule>  OLANZapine 5 milliGRAM(s) Oral daily    MEDICATIONS  (PRN):  haloperidol    Injectable 5 milliGRAM(s) IntraMuscular every 6 hours PRN severe agitation  LORazepam     Tablet 1 milliGRAM(s) Oral every 6 hours PRN anxiety  LORazepam   Injectable 2 milliGRAM(s) IntraMuscular every 6 hours PRN severe agitation        Vital Signs Last 24 Hrs  T(C): 36.7 (06 Mar 2020 08:35), Max: 36.7 (06 Mar 2020 08:35)  T(F): 98.1 (06 Mar 2020 08:35), Max: 98.1 (06 Mar 2020 08:35)  HR: 124 (06 Mar 2020 08:35) (115 - 124)  BP: 94/71 (06 Mar 2020 08:35) (94/71 - 134/85)  RR: 18 (06 Mar 2020 08:35) (18 - 18)  SpO2: 100% (06 Mar 2020 08:35) (98% - 100%)    ROS ; c/o umbilical rash, no dysuria, insomnia improved .      PHYSICAL EXAM:  GENERAL: NAD, well-groomed, well-developed  HEAD:  Atraumatic, Normocephalic  EYES: EOMI, PERRLA, conjunctiva and sclera clear  ENMT: No tonsillar erythema, exudates, or enlargement; Moist mucous membranes .  NECK: Supple, No JVD, Normal thyroid  CHEST/LUNG: Clear to auscultation bilaterally; No rales, rhonchi, wheezing, or rubs  HEART: Regular rate and rhythm; No murmurs, rubs, or gallops  ABDOMEN: Soft, Nontender, Nondistended; Bowel sounds present  EXTREMITIES:  2+ Peripheral Pulses, No clubbing, cyanosis, or edema  LYMPH: No lymphadenopathy noted  SKIN:  rashes of umbilicus.     LABS:                CAPILLARY BLOOD GLUCOSE          02-12 [id number] 5.0        Thyroid Stimulating Hormone, Serum: 1.69 uIU/mL (02-12 @ 15:43)

## 2020-03-06 NOTE — PROGRESS NOTE BEHAVIORAL HEALTH - NSBHFUPINTERVALHXFT_PSY_A_CORE
Patient seen, evaluated and chart reviewed. Case discussed in tx team meeting and with Dr. Nicole. . No significant interval events are reported.  Staff continues to encourage patient to go to groups and socialize with peers, and patient did report she went to one  today and found it helpful to her.  Verbalizes no paranoid thoughts about staff . No delusional thoughts are elicited.   She reports she feels depressed today. . Continues to ask appropriate questions about AOT.   Denies any SI or HI or any psychotic sx.  No Rx SE or sx TD/EPS are noted or reported. Continue to await AOT,  Reports rash in umbilical area, and fungus on right great toe persist.  Dr. Garcia to see patient. Patient reported to have tachycardia, pulse today 124.  Cardiac consult ordered, Dr. Young to see patient. Begin Zoloft 25 mg qam

## 2020-03-07 RX ADMIN — SERTRALINE 25 MILLIGRAM(S): 25 TABLET, FILM COATED ORAL at 09:09

## 2020-03-07 RX ADMIN — Medication 1 MILLIGRAM(S): at 12:54

## 2020-03-07 RX ADMIN — Medication 1 APPLICATION(S): at 09:09

## 2020-03-07 RX ADMIN — OLANZAPINE 15 MILLIGRAM(S): 15 TABLET, FILM COATED ORAL at 17:32

## 2020-03-07 NOTE — CONSULT NOTE ADULT - SUBJECTIVE AND OBJECTIVE BOX
Patient seen in NAD.    Vascular: DP 1/4 PT 0/4. CFT 5 sec x 10. Temperature gradient appears warm to cool.  Neuro: Protective sensation is intact at this time  Derm: Elongated mycotic nails x 10. 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    Vascular: DP 1/4 PT 1/4. CFT 5 sec x 10. Temperature gradient appears normal.  Neuro: Protective sensation is intact at this time  Derm: Elongated mycotic nails x 10. Dry skin to B/L feet  Musculoskeletal: Patient has flexible hammertoes to toes 2-5 B/L

## 2020-03-07 NOTE — CONSULT NOTE ADULT - ASSESSMENT
Ifeoma B/L, Xerosis, Dermatophytosis of nails, atherosclerosis Ifeoma B/L, Xerosis, Dermatophytosis of nails, Pain in toes

## 2020-03-07 NOTE — CONSULT NOTE ADULT - ATTENDING COMMENTS
Patient was evaluated  Aseptic debridement of mycotic nails x 10 was performed to relieve pain and prevent infection.  Discussed shoegear to accommodate hammertoe deformities.   Recommend OTC cream to hydrate the feet.  Thank you for this consult.

## 2020-03-08 RX ADMIN — Medication 1 MILLIGRAM(S): at 16:37

## 2020-03-08 RX ADMIN — OLANZAPINE 15 MILLIGRAM(S): 15 TABLET, FILM COATED ORAL at 17:32

## 2020-03-08 RX ADMIN — SERTRALINE 25 MILLIGRAM(S): 25 TABLET, FILM COATED ORAL at 09:39

## 2020-03-09 LAB
ANION GAP SERPL CALC-SCNC: 8 MMOL/L — SIGNIFICANT CHANGE UP (ref 5–17)
BUN SERPL-MCNC: 17 MG/DL — SIGNIFICANT CHANGE UP (ref 7–23)
CALCIUM SERPL-MCNC: 9.3 MG/DL — SIGNIFICANT CHANGE UP (ref 8.4–10.5)
CHLORIDE SERPL-SCNC: 107 MMOL/L — SIGNIFICANT CHANGE UP (ref 96–108)
CO2 SERPL-SCNC: 29 MMOL/L — SIGNIFICANT CHANGE UP (ref 22–31)
CREAT SERPL-MCNC: 0.82 MG/DL — SIGNIFICANT CHANGE UP (ref 0.5–1.3)
GLUCOSE BLDC GLUCOMTR-MCNC: 107 MG/DL — HIGH (ref 70–99)
GLUCOSE SERPL-MCNC: 107 MG/DL — HIGH (ref 70–99)
HCT VFR BLD CALC: 43.6 % — SIGNIFICANT CHANGE UP (ref 34.5–45)
HGB BLD-MCNC: 14.6 G/DL — SIGNIFICANT CHANGE UP (ref 11.5–15.5)
MCHC RBC-ENTMCNC: 30.5 PG — SIGNIFICANT CHANGE UP (ref 27–34)
MCHC RBC-ENTMCNC: 33.5 GM/DL — SIGNIFICANT CHANGE UP (ref 32–36)
MCV RBC AUTO: 91 FL — SIGNIFICANT CHANGE UP (ref 80–100)
NRBC # BLD: 0 /100 WBCS — SIGNIFICANT CHANGE UP (ref 0–0)
PLATELET # BLD AUTO: 194 K/UL — SIGNIFICANT CHANGE UP (ref 150–400)
POTASSIUM SERPL-MCNC: 4.1 MMOL/L — SIGNIFICANT CHANGE UP (ref 3.5–5.3)
POTASSIUM SERPL-SCNC: 4.1 MMOL/L — SIGNIFICANT CHANGE UP (ref 3.5–5.3)
RBC # BLD: 4.79 M/UL — SIGNIFICANT CHANGE UP (ref 3.8–5.2)
RBC # FLD: 11.2 % — SIGNIFICANT CHANGE UP (ref 10.3–14.5)
SODIUM SERPL-SCNC: 144 MMOL/L — SIGNIFICANT CHANGE UP (ref 135–145)
WBC # BLD: 4.43 K/UL — SIGNIFICANT CHANGE UP (ref 3.8–10.5)
WBC # FLD AUTO: 4.43 K/UL — SIGNIFICANT CHANGE UP (ref 3.8–10.5)

## 2020-03-09 PROCEDURE — 99232 SBSQ HOSP IP/OBS MODERATE 35: CPT

## 2020-03-09 PROCEDURE — 99203 OFFICE O/P NEW LOW 30 MIN: CPT

## 2020-03-09 RX ORDER — OLANZAPINE 15 MG/1
10 TABLET, FILM COATED ORAL
Refills: 0 | Status: DISCONTINUED | OUTPATIENT
Start: 2020-03-09 | End: 2020-03-14

## 2020-03-09 RX ADMIN — SERTRALINE 25 MILLIGRAM(S): 25 TABLET, FILM COATED ORAL at 09:33

## 2020-03-09 RX ADMIN — Medication 1 MILLIGRAM(S): at 11:21

## 2020-03-09 RX ADMIN — OLANZAPINE 10 MILLIGRAM(S): 15 TABLET, FILM COATED ORAL at 17:45

## 2020-03-09 RX ADMIN — Medication 1 APPLICATION(S): at 11:20

## 2020-03-09 NOTE — PROGRESS NOTE ADULT - ASSESSMENT
dizziness and fall ; consulted by cardiologist, did not think it was orthostatic related . Advised to increase fluid intake.  insomnia ; improved  fatigue ; improved   psych disorder ; improved, continue treatment as per psych advise .

## 2020-03-09 NOTE — PROGRESS NOTE BEHAVIORAL HEALTH - NSBHFUPINTERVALHXFT_PSY_A_CORE
Patient seen, evaluated and chart reviewed. Case discussed in tx team meeting and with Dr. Nicole. . No significant interval events are reported, except patient had episode of reported dizziness, and was lowered to the floor by the staff  this morning ,but reports she feels fine now.  Staff continues to encourage patient to go to groups and socialize with peers, but today reports groups make her feel anxious. .  Verbalizes no paranoid thoughts about staff . No delusional thoughts are elicited.   She reports she feels happy today.  Rash is resolved. . Continues to ask appropriate questions about AOT.   Denies any SI or HI or any psychotic sx.  No Rx SE or sx TD/EPS are noted or reported. Continue to await AOT, Dr. Garcia saw patient today, and Dr. Young saw patient as well.  Staff to encourage po fluids,    Zoloft DC and Zyprexa lowered to 10mg hs.

## 2020-03-09 NOTE — CONSULT NOTE ADULT - SUBJECTIVE AND OBJECTIVE BOX
History of Present Illness: The patient is a 24 year old female with a history of bipolar disorder, schizophrenia who is admitted with inpatient psychiatry. She was noted to have a fall and low BPs today. She notes some dizziness when standing up. She feels her heart racing at times.    Past Medical/Surgical History:  Bipolar disorder, schizophrenia    Medications:  MEDICATIONS  (STANDING):  nystatin/triamcinolone Cream 1 Application(s) Topical two times a day  OLANZapine 15 milliGRAM(s) Oral <User Schedule>  sertraline 25 milliGRAM(s) Oral daily    MEDICATIONS  (PRN):  haloperidol     Tablet 5 milliGRAM(s) Oral every 6 hours PRN agitation  haloperidol    Injectable 5 milliGRAM(s) IntraMuscular every 6 hours PRN severe agitation  hydrOXYzine hydrochloride 25 milliGRAM(s) Oral every 6 hours PRN anxiety  LORazepam     Tablet 1 milliGRAM(s) Oral every 8 hours PRN anxiety  LORazepam   Injectable 2 milliGRAM(s) IntraMuscular every 6 hours PRN severe agitation      Family History: Non-contributory family history of premature cardiovascular atherosclerotic disease    Social History: No tobacco, alcohol or drug use    Review of Systems:  General: No fevers, chills, weight loss or gain  Skin: No rashes, color changes  Cardiovascular: No chest pain, orthopnea  Respiratory: No shortness of breath, cough  Gastrointestinal: No nausea, abdominal pain  Genitourinary: No incontinence, pain with urination  Musculoskeletal: No pain, swelling, decreased range of motion  Neurological: No headache, weakness  Psychiatric: No depression, anxiety  Endocrine: No weight loss or gain, increased thirst  All other systems are comprehensively negative.    Physical Exam:  Vitals:        Vital Signs Last 24 Hrs  T(C): 36.6 (09 Mar 2020 07:35), Max: 36.6 (09 Mar 2020 07:35)  T(F): 97.9 (09 Mar 2020 07:35), Max: 97.9 (09 Mar 2020 07:35)  HR: 83 (09 Mar 2020 07:35) (82 - 83)  BP: 114/78 (08 Mar 2020 16:17) (114/78 - 114/78)  BP(mean): --  RR: 16 (09 Mar 2020 07:35) (16 - 16)  SpO2: 98% (09 Mar 2020 07:35) (98% - 98%)  General: NAD  HEENT: MMM  Neck: No JVD, no carotid bruit  Lungs: CTAB  CV: RRR, nl S1/S2, no M/R/G  Abdomen: S/NT/ND, +BS  Extremities: No LE edema, no cyanosis  Neuro: AAOx3, non-focal  Skin: No rash    Labs:                  ECG: Sinus bradycardia, normal axis, no ST abnormality

## 2020-03-09 NOTE — CHART NOTE - NSCHARTNOTEFT_GEN_A_CORE
As per staff, patient felt nauseouus and dizzizzy this morning, staff assisted ti sit her on the floor, felt better after drinking juice. Patient is seen and examined. ambulating independently without any problems.  Denies any chest pain, palpitation, headache, dizziness, nausea, vomiting, abdominal pain, diarrhea, constipation, sob, cough, cold, LOC.    Alert and oriented x3.    No orthostatic.    PHYSICAL EXAM:  GENERAL: NAD, well-groomed, well-developed  HEAD:  Atraumatic, Normocephalic  EYES: EOMI, PERRLA, conjunctiva and sclera clear  ENMT: No tonsillar erythema, exudates, or enlargement; Moist mucous membranes, Good dentition, No lesions  NECK: Supple, No JVD, Normal thyroid  NERVOUS SYSTEM:  Alert & Oriented X3, Good concentration; Motor Strength 5/5 B/L upper and lower extremities; DTRs 2+ intact and symmetric  CHEST/LUNG: Clear to auscultation bilaterally; No rales, rhonchi, wheezing, or rubs  HEART: Regular rate and rhythm; No murmurs, rubs, or gallops  ABDOMEN: Soft, Nontender, Nondistended; Bowel sounds present  EXTREMITIES:  2+ Peripheral Pulses, No clubbing or cyanosis  SKIN: No rashes or lesions    A/P Nausea/ dizziness- resolved.  will get cbc, bmp.   notified.

## 2020-03-09 NOTE — PROGRESS NOTE ADULT - SUBJECTIVE AND OBJECTIVE BOX
Progress: pt had a fall this AM due to feeling dizzy . had low BP .    Allergies    No Known Allergies    Intolerances    MEDICATIONS  (STANDING):  MEDICATIONS  (STANDING):  OLANZapine 10 milliGRAM(s) Oral <User Schedule>  OLANZapine 5 milliGRAM(s) Oral daily    MEDICATIONS  (PRN):  haloperidol    Injectable 5 milliGRAM(s) IntraMuscular every 6 hours PRN severe agitation  LORazepam     Tablet 1 milliGRAM(s) Oral every 6 hours PRN anxiety  LORazepam   Injectable 2 milliGRAM(s) IntraMuscular every 6 hours PRN severe agitation        Vital Signs Last 24 Hrs  T(C): 36.6 (09 Mar 2020 07:35), Max: 36.6 (09 Mar 2020 07:35)  T(F): 97.9 (09 Mar 2020 07:35), Max: 97.9 (09 Mar 2020 07:35)  HR: 84 (09 Mar 2020 11:32) (82 - 84)  BP: 113/78 (09 Mar 2020 11:32) (113/78 - 114/78)  RR: 16 (09 Mar 2020 07:35) (16 - 16)  SpO2: 98% (09 Mar 2020 07:35) (98% - 98%)      ROS ; s/p dizziness  , no sob, no chest pain,  no dysuria, insomnia improved .      PHYSICAL EXAM:  GENERAL: NAD, well-groomed, well-developed  HEAD:  Atraumatic, Normocephalic  EYES: EOMI, PERRLA, conjunctiva and sclera clear  ENMT: No tonsillar erythema, exudates, or enlargement; Moist mucous membranes .  NECK: Supple, No JVD, Normal thyroid  CHEST/LUNG: Clear to auscultation bilaterally; No rales, rhonchi, wheezing, or rubs  HEART: Regular rate and rhythm; No murmurs, rubs, or gallops  ABDOMEN: Soft, Nontender, Nondistended; Bowel sounds present  EXTREMITIES:  2+ Peripheral Pulses, No clubbing, cyanosis, or edema  LYMPH: No lymphadenopathy noted  SKIN:  rashes of umbilicus.           LAB;  CAPILLARY BLOOD GLUCOSE      POCT Blood Glucose.: 107 mg/dL (09 Mar 2020 07:43)      02-12 [id number] 5.0        Thyroid Stimulating Hormone, Serum: 1.69 uIU/mL (02-12 @ 15:43)

## 2020-03-09 NOTE — CONSULT NOTE ADULT - ASSESSMENT
The patient is a 24 year old female with a history of bipolar disorder, schizophrenia who is admitted with inpatient psychiatry.    Plan:  - ECG with sinus bradycardia  - BP on lower side  - Orthostatics negative  - Encourage oral hydration  - No further cardiac testing indicated at this time

## 2020-03-10 PROCEDURE — 99233 SBSQ HOSP IP/OBS HIGH 50: CPT

## 2020-03-10 RX ORDER — TRAZODONE HCL 50 MG
50 TABLET ORAL AT BEDTIME
Refills: 0 | Status: DISCONTINUED | OUTPATIENT
Start: 2020-03-10 | End: 2020-03-11

## 2020-03-10 RX ADMIN — Medication 1 APPLICATION(S): at 08:45

## 2020-03-10 RX ADMIN — Medication 1 MILLIGRAM(S): at 07:48

## 2020-03-10 RX ADMIN — OLANZAPINE 10 MILLIGRAM(S): 15 TABLET, FILM COATED ORAL at 17:41

## 2020-03-10 RX ADMIN — Medication 1 MILLIGRAM(S): at 17:41

## 2020-03-10 NOTE — PROGRESS NOTE BEHAVIORAL HEALTH - NSBHFUPINTERVALHXFT_PSY_A_CORE
Patient seen, evaluated and chart reviewed. Case discussed in tx team meeting and with Dr. Nicole. . No significant interval events are reported.   Staff continues to encourage patient to go to groups and socialize with peers, but today reports groups make her feel anxious. .  Verbalizes no paranoid thoughts about staff . No delusional thoughts are elicited.   She reports she feels happy today. . Continues to ask appropriate questions about AOT.   Denies any SI or HI or any psychotic sx.  No Rx SE or sx TD/EPS are noted or reported. Continue to await AOT.  Ativan changed to 1mg bid, as patient reports it helps with her anxiety about groups.  Trazadone added hs for insomnia.

## 2020-03-11 PROCEDURE — 99231 SBSQ HOSP IP/OBS SF/LOW 25: CPT

## 2020-03-11 RX ORDER — ACETAMINOPHEN 500 MG
650 TABLET ORAL EVERY 6 HOURS
Refills: 0 | Status: DISCONTINUED | OUTPATIENT
Start: 2020-03-11 | End: 2020-03-11

## 2020-03-11 RX ORDER — ACETAMINOPHEN 500 MG
650 TABLET ORAL EVERY 6 HOURS
Refills: 0 | Status: DISCONTINUED | OUTPATIENT
Start: 2020-03-11 | End: 2020-03-27

## 2020-03-11 RX ADMIN — Medication 650 MILLIGRAM(S): at 16:31

## 2020-03-11 RX ADMIN — Medication 1 APPLICATION(S): at 08:12

## 2020-03-11 RX ADMIN — Medication 650 MILLIGRAM(S): at 13:51

## 2020-03-11 RX ADMIN — OLANZAPINE 10 MILLIGRAM(S): 15 TABLET, FILM COATED ORAL at 18:13

## 2020-03-11 RX ADMIN — Medication 1 MILLIGRAM(S): at 18:13

## 2020-03-11 RX ADMIN — Medication 1 MILLIGRAM(S): at 08:12

## 2020-03-11 NOTE — PROGRESS NOTE ADULT - ASSESSMENT
head ache ; start pt on Tylenol as needed.  tachycardia anxiety related .  umbilical rash ; improved .  insomnia ; improved  fatigue ; improved   psych disorder ; improved, continue treatment as per psych advise .

## 2020-03-11 NOTE — PROGRESS NOTE ADULT - SUBJECTIVE AND OBJECTIVE BOX
Progress: pt c/o head ache.    Allergies    No Known Allergies    Intolerances    MEDICATIONS  (STANDING):  MEDICATIONS  (STANDING):  OLANZapine 10 milliGRAM(s) Oral <User Schedule>  OLANZapine 5 milliGRAM(s) Oral daily    MEDICATIONS  (PRN):  haloperidol    Injectable 5 milliGRAM(s) IntraMuscular every 6 hours PRN severe agitation  LORazepam     Tablet 1 milliGRAM(s) Oral every 6 hours PRN anxiety  LORazepam   Injectable 2 milliGRAM(s) IntraMuscular every 6 hours PRN severe agitation        Vital Signs Last 24 Hrs  T(C): 36.8 (11 Mar 2020 08:00), Max: 36.8 (11 Mar 2020 08:00)  T(F): 98.2 (11 Mar 2020 08:00), Max: 98.2 (11 Mar 2020 08:00)  HR: 124 (11 Mar 2020 08:00) (81 - 124)  BP: 83/64 (11 Mar 2020 08:00) (83/64 - 118/83)  RR: 18 (11 Mar 2020 08:00) (18 - 18)  SpO2: 99% (11 Mar 2020 08:00) (98% - 99%)      ROS ; c/o head ache, no dizziness  , no sob, no chest pain,  no dysuria, insomnia improved .      PHYSICAL EXAM:  GENERAL: NAD, well-groomed, well-developed  HEAD:  Atraumatic, Normocephalic  EYES: EOMI, PERRLA, conjunctiva and sclera clear  ENMT: No tonsillar erythema, exudates, or enlargement; Moist mucous membranes .  NECK: Supple, No JVD, Normal thyroid  CHEST/LUNG: Clear to auscultation bilaterally; No rales, rhonchi, wheezing, or rubs  HEART: Regular rate and rhythm; No murmurs, rubs, or gallops  ABDOMEN: Soft, Nontender, Nondistended; Bowel sounds present  EXTREMITIES:  2+ Peripheral Pulses, No clubbing, cyanosis, or edema  LYMPH: No lymphadenopathy noted  SKIN:  rashes of umbilicus subsided .        LAB;                        14.6   4.43  )-----------( 194      ( 09 Mar 2020 13:03 )             43.6     09 Mar 2020 13:03    144    |  107    |  17     ----------------------------<  107    4.1     |  29     |  0.82     Ca    9.3        09 Mar 2020 13:03    02-12 [id number] 5.0        Thyroid Stimulating Hormone, Serum: 1.69 uIU/mL (02-12 @ 15:43)

## 2020-03-11 NOTE — PROGRESS NOTE BEHAVIORAL HEALTH - NSBHFUPINTERVALHXFT_PSY_A_CORE
Patient seen, evaluated and chart reviewed. Case discussed in tx team meeting and with Dr. Nicole. . No significant interval events are reported except patient refused Trazadone last night, and reports does not want to take it.    Staff continues to encourage patient to go to groups and socialize with peers, and today reports feels less anxious about groups and will go to groups.  .  Verbalizes no paranoid thoughts about staff . No delusional thoughts are elicited.   She continues to report she feels happy . . Continues to ask appropriate questions about AOT.   Denies any SI or HI or any psychotic sx.  No Rx SE or sx TD/EPS are noted or reported. Continue to await AOT.    Trazadone D/C due to patient declining to take it.  Patient is walking on unit for exercise.

## 2020-03-12 PROCEDURE — 99231 SBSQ HOSP IP/OBS SF/LOW 25: CPT

## 2020-03-12 RX ADMIN — Medication 1 MILLIGRAM(S): at 08:27

## 2020-03-12 RX ADMIN — OLANZAPINE 10 MILLIGRAM(S): 15 TABLET, FILM COATED ORAL at 17:36

## 2020-03-12 RX ADMIN — Medication 1 APPLICATION(S): at 08:27

## 2020-03-12 RX ADMIN — Medication 1 MILLIGRAM(S): at 17:36

## 2020-03-12 NOTE — PROGRESS NOTE BEHAVIORAL HEALTH - NSBHFUPINTERVALHXFT_PSY_A_CORE
Patient seen, evaluated and chart reviewed. Case discussed in tx team meeting and with Dr. Nicole. . No significant interval events are reported. Staff continues to encourage patient to go to groups and socialize with peers, and today reports feels less anxious about groups and will go to groups in afternoon. .  Verbalizes no paranoid thoughts about staff . No delusional thoughts are elicited.   She continues to report she feels happy . . Continues to ask appropriate questions about AOT.   Denies any SI or HI or any psychotic sx.  No Rx SE or sx TD/EPS are noted or reported. Continue to await AOT and SW reports court may take place next week.  Patient is walking on unit for exercise.

## 2020-03-13 PROCEDURE — 99232 SBSQ HOSP IP/OBS MODERATE 35: CPT

## 2020-03-13 RX ADMIN — Medication 1 APPLICATION(S): at 08:09

## 2020-03-13 RX ADMIN — OLANZAPINE 10 MILLIGRAM(S): 15 TABLET, FILM COATED ORAL at 17:30

## 2020-03-13 RX ADMIN — Medication 1 MILLIGRAM(S): at 17:30

## 2020-03-13 RX ADMIN — Medication 1 MILLIGRAM(S): at 08:09

## 2020-03-13 NOTE — PROGRESS NOTE BEHAVIORAL HEALTH - NSBHFUPINTERVALHXFT_PSY_A_CORE
Patient seen, evaluated and chart reviewed. Case discussed in tx team meeting and with Dr. Nicole. . No significant interval events are reported. Staff continues to encourage patient to go to groups and socialize with peers, and today reports feels less anxious about groups and will go to groups in afternoon. .  Verbalizes no paranoid thoughts about staff . No delusional thoughts are elicited.   She continues to report she feels happy . . Continues to ask appropriate questions about AOT.   Denies any SI or HI or any psychotic sx.  No Rx SE or sx TD/EPS are noted or reported. Continue to await AOT and SW reports court may take place next week.  Patient is walking on unit for exercise. spoke with Tiffany Brito, pharmacist, and she reports will discuss with [facility] about what to do about  Zyprexa which was to be given to patient on DC when patient was going to a shelter, but is not needed if patient has AOT and is in a residence.

## 2020-03-14 RX ORDER — OLANZAPINE 15 MG/1
10 TABLET, FILM COATED ORAL
Refills: 0 | Status: DISCONTINUED | OUTPATIENT
Start: 2020-03-14 | End: 2020-03-27

## 2020-03-14 RX ADMIN — Medication 1 MILLIGRAM(S): at 16:34

## 2020-03-14 RX ADMIN — Medication 1 MILLIGRAM(S): at 08:19

## 2020-03-14 RX ADMIN — OLANZAPINE 10 MILLIGRAM(S): 15 TABLET, FILM COATED ORAL at 16:34

## 2020-03-14 RX ADMIN — Medication 1 APPLICATION(S): at 08:19

## 2020-03-15 RX ADMIN — Medication 1 APPLICATION(S): at 08:30

## 2020-03-15 RX ADMIN — Medication 1 MILLIGRAM(S): at 15:10

## 2020-03-15 RX ADMIN — OLANZAPINE 10 MILLIGRAM(S): 15 TABLET, FILM COATED ORAL at 15:09

## 2020-03-15 RX ADMIN — Medication 1 MILLIGRAM(S): at 08:30

## 2020-03-16 PROCEDURE — 99232 SBSQ HOSP IP/OBS MODERATE 35: CPT

## 2020-03-16 RX ADMIN — OLANZAPINE 10 MILLIGRAM(S): 15 TABLET, FILM COATED ORAL at 15:15

## 2020-03-16 RX ADMIN — Medication 1 MILLIGRAM(S): at 09:33

## 2020-03-16 RX ADMIN — Medication 1 APPLICATION(S): at 20:51

## 2020-03-16 RX ADMIN — Medication 1 MILLIGRAM(S): at 15:15

## 2020-03-16 RX ADMIN — Medication 1 APPLICATION(S): at 09:34

## 2020-03-16 NOTE — PROGRESS NOTE BEHAVIORAL HEALTH - NSBHFUPINTERVALHXFT_PSY_A_CORE
Patient seen, evaluated and chart reviewed. Case discussed in tx team meeting and with Dr. Nicole. . No significant interval events are reported. Staff continues to encourage patient to go to groups and socialize with peers, and today reports feels less anxious about groups and is going to groups  .  Verbalizes no paranoid thoughts about staff . No delusional thoughts are elicited.   She continues to report she feels happy . . Continues to ask appropriate questions about AOT, and how she will get her Rx after she is DC.   Denies any SI or HI or any psychotic sx.  No Rx SE or sx TD/EPS are noted or reported. Continue to await AOT.  Spoke with Azael Moody, who reports administrative hearing may take place this week.  Patient is walking on unit for exercise. spoke with Tiffany Brito, pharmacist, and she reports she did discuss  with [facility] about what to do about  Zyprexa which was to be given to patient on DC when patient was going to a shelter, and they recommend Rx be discarded by pharmacy.

## 2020-03-17 PROCEDURE — 99232 SBSQ HOSP IP/OBS MODERATE 35: CPT

## 2020-03-17 RX ADMIN — Medication 1 APPLICATION(S): at 08:22

## 2020-03-17 RX ADMIN — Medication 1 APPLICATION(S): at 20:56

## 2020-03-17 RX ADMIN — Medication 1 MILLIGRAM(S): at 16:01

## 2020-03-17 RX ADMIN — Medication 1 MILLIGRAM(S): at 08:22

## 2020-03-17 RX ADMIN — OLANZAPINE 10 MILLIGRAM(S): 15 TABLET, FILM COATED ORAL at 16:01

## 2020-03-17 NOTE — PROGRESS NOTE BEHAVIORAL HEALTH - NSBHFUPINTERVALCCFT_PSY_A_CORE
" I left group for a little while because they were talking about family and I got upset, because my family doesn't help me"

## 2020-03-17 NOTE — PROGRESS NOTE BEHAVIORAL HEALTH - NSBHFUPINTERVALHXFT_PSY_A_CORE
Patient seen, evaluated and chart reviewed. Case discussed in tx team meeting and with Dr. Nicole. . No significant interval events are reported. Staff continues to encourage patient to go to groups and socialize with peers, and today reports feels less anxious about groups and is going to groups. Discussed being upset in group because peers were discussing their families, and she feels her family and 's family have abandoned her.  Support provided, and patient was able to return to group..  Verbalizes no paranoid thoughts about staff . No delusional thoughts are elicited.   She continues to report she feels happy . . Continues to ask appropriate questions about AOT, and how she will get her Rx after she is DC. Patient reports she is fine with being on AOT and feels it will help her.   Per Dr. Beasley, administrative hearing is scheduled for today via telephone due to corona virus issues.     Denies any SI or HI or any psychotic sx.  No Rx SE or sx TD/EPS are noted or reported.

## 2020-03-17 NOTE — CHART NOTE - NSCHARTNOTEFT_GEN_A_CORE
Assessment: Pt seen for monthly nutrition reassessment per policy.  Pt is a 23yo female with pphx of Schizoaffective disorder, admitted with worsening psychosis and suicidal and homicidal ideation.  Pt continues on regular diet (lacto-ovo vegetarian) with active order for double portions as of 2/12.  Nursing continues to report good appetite and intake.    Factors impacting intake: [ ] none [ ] nausea  [ ] vomiting [ ] diarrhea [ ] constipation  [ ]chewing problems [ ] swallowing issues  [ ] other:     Diet Prescription: regular lacto-ovo  Intake: good    Current Weight: Weight (kg): 111.7 (03-17 @ 07:35)  admission wt 243#, cbw as of today 3/17 245#, wt generally stable/2# wt gain per documented wts, rec enc weekly wts    Pertinent Medications: MEDICATIONS  (STANDING):  LORazepam     Tablet 1 milliGRAM(s) Oral <User Schedule>  nystatin/triamcinolone Cream 1 Application(s) Topical two times a day  OLANZapine 10 milliGRAM(s) Oral <User Schedule>    MEDICATIONS  (PRN):  acetaminophen   Tablet .. 650 milliGRAM(s) Oral every 6 hours PRN Mild Pain (1 - 3)  haloperidol     Tablet 5 milliGRAM(s) Oral every 6 hours PRN agitation  haloperidol    Injectable 5 milliGRAM(s) IntraMuscular every 6 hours PRN severe agitation  LORazepam     Tablet 1 milliGRAM(s) Oral daily PRN breakthrough anxiety  LORazepam   Injectable 2 milliGRAM(s) IntraMuscular every 6 hours PRN severe agitation    Pertinent Labs:      CAPILLARY BLOOD GLUCOSE        Skin: intact    Estimated Needs:   [ x] no change since previous assessment  [ ] recalculated:     Previous Nutrition Diagnosis:   [ ] Inadequate Energy Intake [ ]Inadequate Oral Intake [ ] Excessive Energy Intake   [ ] Underweight [ ] Increased Nutrient Needs [x ] Overweight/Obesity   [ ] Altered GI Function [ ] Unintended Weight Loss [ ] Food & Nutrition Related Knowledge Deficit [ ] Malnutrition     Nutrition Diagnosis is [ x] ongoing  [ ] resolved [ ] not applicable     New Nutrition Diagnosis: [ ] not applicable       Interventions: regular diet  Recommend  [ ] Change Diet To:  [ ] Nutrition Supplement  [ ] Nutrition Support  [ ] Other:     Monitoring and Evaluation:   [x ] PO intake [ x ] Tolerance to diet prescription [ x ] weights [ x ] labs[ x ] follow up per protocol  [ ] other:

## 2020-03-18 PROCEDURE — 99231 SBSQ HOSP IP/OBS SF/LOW 25: CPT

## 2020-03-18 RX ADMIN — OLANZAPINE 10 MILLIGRAM(S): 15 TABLET, FILM COATED ORAL at 15:59

## 2020-03-18 RX ADMIN — Medication 1 MILLIGRAM(S): at 09:06

## 2020-03-18 RX ADMIN — Medication 1 APPLICATION(S): at 09:06

## 2020-03-18 RX ADMIN — Medication 1 APPLICATION(S): at 22:00

## 2020-03-18 RX ADMIN — Medication 1 MILLIGRAM(S): at 15:59

## 2020-03-18 NOTE — PROGRESS NOTE BEHAVIORAL HEALTH - NSBHFUPINTERVALHXFT_PSY_A_CORE
Patient seen, evaluated and chart reviewed. Case discussed in tx team meeting and with Dr. Nicole. . No significant interval events are reported. Staff continues to encourage patient to go to groups and socialize with peers, and  reports feels less anxious about groups and is going to groups..  Verbalizes no paranoid thoughts about staff . No delusional thoughts are elicited.   She continues to report she feels happy . . Continues to ask appropriate questions about AOT, and how soon she will be going to her residence. . Patient reports she is fine with being on AOT and feels it will help her.   Per Dr. Beasley, administrative hearing for AOT held on phone on 3/17.    Denies any SI or HI or any psychotic sx.  No Rx SE or sx TD/EPS are noted or reported.

## 2020-03-19 PROCEDURE — 99231 SBSQ HOSP IP/OBS SF/LOW 25: CPT

## 2020-03-19 RX ADMIN — Medication 1 MILLIGRAM(S): at 08:40

## 2020-03-19 RX ADMIN — OLANZAPINE 10 MILLIGRAM(S): 15 TABLET, FILM COATED ORAL at 15:41

## 2020-03-19 RX ADMIN — Medication 1 MILLIGRAM(S): at 15:43

## 2020-03-19 NOTE — PROGRESS NOTE BEHAVIORAL HEALTH - NSBHFUPINTERVALHXFT_PSY_A_CORE
Patient seen, evaluated and chart reviewed. Case discussed in tx team meeting and with Dr. Nicole. . No significant interval events are reported. Staff continues to encourage patient to go to groups and socialize with peers, and  reports she feels less anxious about groups and is going to groups..  Verbalizes no paranoid thoughts about staff . No delusional thoughts are elicited.   She continues to report she feels happy . Was singing on unit today, and reports she enjoys singing.  . Continues to ask appropriate questions about AOT, and how soon she will be going to her residence. Support provided. . Patient reports she is fine with being on AOT and feels it will help her.    Denies any SI or HI or any psychotic sx.  No Rx SE or sx TD/EPS are noted or reported.

## 2020-03-20 PROCEDURE — 99231 SBSQ HOSP IP/OBS SF/LOW 25: CPT

## 2020-03-20 RX ADMIN — Medication 1 MILLIGRAM(S): at 08:24

## 2020-03-20 RX ADMIN — OLANZAPINE 10 MILLIGRAM(S): 15 TABLET, FILM COATED ORAL at 16:05

## 2020-03-20 RX ADMIN — Medication 1 MILLIGRAM(S): at 16:05

## 2020-03-20 RX ADMIN — Medication 1 APPLICATION(S): at 08:24

## 2020-03-20 NOTE — PROGRESS NOTE BEHAVIORAL HEALTH - NSBHFUPINTERVALHXFT_PSY_A_CORE
Patient seen, evaluated and chart reviewed. Case discussed in tx team meeting and with Dr. Nicole. . No significant interval events are reported. Staff continues to encourage patient to go to groups and socialize with peers, and  reports she feels less anxious about groups and is going to some groups..  Verbalizes no paranoid thoughts about staff . No delusional thoughts are elicited.   She continues to report she feels happy .. Continues to ask appropriate questions about AOT, and how soon she will be going to her residence. Support provided.  Informed patient that [place] legal department has reported delay in court date, and will let us know when court date is set.  Denies any SI or HI.  No Rx SE or sx TD/EPS are noted or reported.

## 2020-03-21 RX ADMIN — Medication 1 MILLIGRAM(S): at 08:31

## 2020-03-21 RX ADMIN — Medication 1 APPLICATION(S): at 08:31

## 2020-03-21 RX ADMIN — OLANZAPINE 10 MILLIGRAM(S): 15 TABLET, FILM COATED ORAL at 17:22

## 2020-03-21 RX ADMIN — Medication 1 MILLIGRAM(S): at 17:22

## 2020-03-22 RX ADMIN — OLANZAPINE 10 MILLIGRAM(S): 15 TABLET, FILM COATED ORAL at 17:04

## 2020-03-22 RX ADMIN — Medication 1 MILLIGRAM(S): at 17:04

## 2020-03-22 RX ADMIN — Medication 1 MILLIGRAM(S): at 08:34

## 2020-03-23 LAB
ALBUMIN SERPL ELPH-MCNC: 2.5 G/DL — LOW (ref 3.3–5)
ALBUMIN SERPL ELPH-MCNC: 3.7 G/DL — SIGNIFICANT CHANGE UP (ref 3.3–5)
ALP SERPL-CCNC: 61 U/L — SIGNIFICANT CHANGE UP (ref 30–120)
ALP SERPL-CCNC: 69 U/L — SIGNIFICANT CHANGE UP (ref 30–120)
ALT FLD-CCNC: 22 U/L DA — SIGNIFICANT CHANGE UP (ref 10–60)
ALT FLD-CCNC: 22 U/L DA — SIGNIFICANT CHANGE UP (ref 10–60)
ANION GAP SERPL CALC-SCNC: 4 MMOL/L — LOW (ref 5–17)
ANION GAP SERPL CALC-SCNC: 6 MMOL/L — SIGNIFICANT CHANGE UP (ref 5–17)
AST SERPL-CCNC: 11 U/L — SIGNIFICANT CHANGE UP (ref 10–40)
AST SERPL-CCNC: 26 U/L — SIGNIFICANT CHANGE UP (ref 10–40)
BILIRUB SERPL-MCNC: 0.2 MG/DL — SIGNIFICANT CHANGE UP (ref 0.2–1.2)
BILIRUB SERPL-MCNC: 0.5 MG/DL — SIGNIFICANT CHANGE UP (ref 0.2–1.2)
BUN SERPL-MCNC: 13 MG/DL — SIGNIFICANT CHANGE UP (ref 7–23)
BUN SERPL-MCNC: 20 MG/DL — SIGNIFICANT CHANGE UP (ref 7–23)
CALCIUM SERPL-MCNC: 8.2 MG/DL — LOW (ref 8.4–10.5)
CALCIUM SERPL-MCNC: 8.8 MG/DL — SIGNIFICANT CHANGE UP (ref 8.4–10.5)
CHLORIDE SERPL-SCNC: 106 MMOL/L — SIGNIFICANT CHANGE UP (ref 96–108)
CHLORIDE SERPL-SCNC: 107 MMOL/L — SIGNIFICANT CHANGE UP (ref 96–108)
CO2 SERPL-SCNC: 29 MMOL/L — SIGNIFICANT CHANGE UP (ref 22–31)
CO2 SERPL-SCNC: 29 MMOL/L — SIGNIFICANT CHANGE UP (ref 22–31)
CREAT SERPL-MCNC: 0.8 MG/DL — SIGNIFICANT CHANGE UP (ref 0.5–1.3)
CREAT SERPL-MCNC: 1.14 MG/DL — SIGNIFICANT CHANGE UP (ref 0.5–1.3)
GLUCOSE SERPL-MCNC: 110 MG/DL — HIGH (ref 70–99)
GLUCOSE SERPL-MCNC: 134 MG/DL — HIGH (ref 70–99)
HCT VFR BLD CALC: 29.1 % — LOW (ref 34.5–45)
HCT VFR BLD CALC: 41.4 % — SIGNIFICANT CHANGE UP (ref 34.5–45)
HGB BLD-MCNC: 13.9 G/DL — SIGNIFICANT CHANGE UP (ref 11.5–15.5)
HGB BLD-MCNC: 9.2 G/DL — LOW (ref 11.5–15.5)
MCHC RBC-ENTMCNC: 30.3 PG — SIGNIFICANT CHANGE UP (ref 27–34)
MCHC RBC-ENTMCNC: 30.4 PG — SIGNIFICANT CHANGE UP (ref 27–34)
MCHC RBC-ENTMCNC: 31.6 GM/DL — LOW (ref 32–36)
MCHC RBC-ENTMCNC: 33.6 GM/DL — SIGNIFICANT CHANGE UP (ref 32–36)
MCV RBC AUTO: 90.6 FL — SIGNIFICANT CHANGE UP (ref 80–100)
MCV RBC AUTO: 95.7 FL — SIGNIFICANT CHANGE UP (ref 80–100)
NRBC # BLD: 0 /100 WBCS — SIGNIFICANT CHANGE UP (ref 0–0)
NRBC # BLD: 0 /100 WBCS — SIGNIFICANT CHANGE UP (ref 0–0)
PLATELET # BLD AUTO: 227 K/UL — SIGNIFICANT CHANGE UP (ref 150–400)
PLATELET # BLD AUTO: 295 K/UL — SIGNIFICANT CHANGE UP (ref 150–400)
POTASSIUM SERPL-MCNC: 3.9 MMOL/L — SIGNIFICANT CHANGE UP (ref 3.5–5.3)
POTASSIUM SERPL-MCNC: 4 MMOL/L — SIGNIFICANT CHANGE UP (ref 3.5–5.3)
POTASSIUM SERPL-SCNC: 3.9 MMOL/L — SIGNIFICANT CHANGE UP (ref 3.5–5.3)
POTASSIUM SERPL-SCNC: 4 MMOL/L — SIGNIFICANT CHANGE UP (ref 3.5–5.3)
PROT SERPL-MCNC: 6.3 G/DL — SIGNIFICANT CHANGE UP (ref 6–8.3)
PROT SERPL-MCNC: 7 G/DL — SIGNIFICANT CHANGE UP (ref 6–8.3)
RBC # BLD: 3.04 M/UL — LOW (ref 3.8–5.2)
RBC # BLD: 4.57 M/UL — SIGNIFICANT CHANGE UP (ref 3.8–5.2)
RBC # FLD: 11.5 % — SIGNIFICANT CHANGE UP (ref 10.3–14.5)
RBC # FLD: 13.5 % — SIGNIFICANT CHANGE UP (ref 10.3–14.5)
SODIUM SERPL-SCNC: 139 MMOL/L — SIGNIFICANT CHANGE UP (ref 135–145)
SODIUM SERPL-SCNC: 142 MMOL/L — SIGNIFICANT CHANGE UP (ref 135–145)
WBC # BLD: 23.06 K/UL — HIGH (ref 3.8–10.5)
WBC # BLD: 6.61 K/UL — SIGNIFICANT CHANGE UP (ref 3.8–10.5)
WBC # FLD AUTO: 23.06 K/UL — HIGH (ref 3.8–10.5)
WBC # FLD AUTO: 6.61 K/UL — SIGNIFICANT CHANGE UP (ref 3.8–10.5)

## 2020-03-23 PROCEDURE — 99231 SBSQ HOSP IP/OBS SF/LOW 25: CPT

## 2020-03-23 RX ORDER — OLANZAPINE 15 MG/1
1 TABLET, FILM COATED ORAL
Qty: 30 | Refills: 0
Start: 2020-03-23 | End: 2020-04-21

## 2020-03-23 RX ORDER — NYSTATIN/TRIAMCINOLONE ACET
1 OINTMENT (GRAM) TOPICAL
Qty: 0 | Refills: 0 | DISCHARGE
Start: 2020-03-23

## 2020-03-23 RX ADMIN — Medication 1 MILLIGRAM(S): at 17:17

## 2020-03-23 RX ADMIN — Medication 1 MILLIGRAM(S): at 08:09

## 2020-03-23 RX ADMIN — OLANZAPINE 10 MILLIGRAM(S): 15 TABLET, FILM COATED ORAL at 15:48

## 2020-03-23 NOTE — PROGRESS NOTE BEHAVIORAL HEALTH - NSBHFUPINTERVALHXFT_PSY_A_CORE
Patient seen, evaluated and chart reviewed. Case discussed in tx team meeting and with Dr. Nicole. . No significant interval events are reported. Staff continues to encourage patient to go to groups and socialize with peers, and  reports she feels less anxious about groups and is going to some groups..  Verbalizes no paranoid thoughts about staff . No delusional thoughts are elicited.   She continues to report she feels happy .. Continues to ask appropriate questions about AOT, and how soon she will be going to her residence. Support provided.  Informed patient that Court date will be 3/25, and once AOT is approved she can go to the residence as soon as they have a bed for her.  Patient reports she is happy to hear this.  Denies any SI or HI.  No Rx SE or sx TD/EPS are noted or reported.

## 2020-03-24 PROCEDURE — 99231 SBSQ HOSP IP/OBS SF/LOW 25: CPT

## 2020-03-24 RX ADMIN — Medication 1 MILLIGRAM(S): at 17:05

## 2020-03-24 RX ADMIN — Medication 1 APPLICATION(S): at 08:41

## 2020-03-24 RX ADMIN — Medication 1 APPLICATION(S): at 20:35

## 2020-03-24 RX ADMIN — OLANZAPINE 10 MILLIGRAM(S): 15 TABLET, FILM COATED ORAL at 17:05

## 2020-03-24 RX ADMIN — Medication 1 MILLIGRAM(S): at 08:41

## 2020-03-24 NOTE — PROVIDER CONTACT NOTE (OTHER) - BACKGROUND
This is a 24 y.o. homeless female who comes in with worsening AH and paranoid ideation, along with SI and HI PPHX: Bipolar disorder vs Schizophrenia; multiple hospitalizations; non-compliant with meds

## 2020-03-24 NOTE — PROVIDER CONTACT NOTE (OTHER) - ASSESSMENT
Patient comes to most groups offered by psych rehab and appears bright, engages in directive of groups and provides feedback to peers. At times pt makes inappropriate comments and stated after she leaves hospitals or treatment centers she throws her medications out. Writer provided pt. with education on medication management and the importance of med compliance.

## 2020-03-24 NOTE — PROGRESS NOTE BEHAVIORAL HEALTH - NSBHFUPINTERVALHXFT_PSY_A_CORE
Patient seen, evaluated and chart reviewed. Case discussed in tx team meeting and with Dr. Nicole. . No significant interval events are reported. Staff continues to encourage patient to go to groups and socialize with peers, and  reports she feels less anxious about groups and is going to some groups..  Verbalizes no paranoid thoughts about staff . No delusional thoughts are elicited.   She continues to report she feels happy .. Continues to ask appropriate questions about AOT, and how soon she will be going to her residence Informed patient that after court date for AOT on 3/35, the residence has reported she can be transferred there on 3/27.  Patient reports she is ok with this.  Support provided.    Denies any SI or HI.  No Rx SE or sx TD/EPS are noted or reported.

## 2020-03-25 LAB
HCT VFR BLD CALC: 41.1 % — SIGNIFICANT CHANGE UP (ref 34.5–45)
HGB BLD-MCNC: 13.7 G/DL — SIGNIFICANT CHANGE UP (ref 11.5–15.5)
MCHC RBC-ENTMCNC: 30.2 PG — SIGNIFICANT CHANGE UP (ref 27–34)
MCHC RBC-ENTMCNC: 33.3 GM/DL — SIGNIFICANT CHANGE UP (ref 32–36)
MCV RBC AUTO: 90.7 FL — SIGNIFICANT CHANGE UP (ref 80–100)
NRBC # BLD: 0 /100 WBCS — SIGNIFICANT CHANGE UP (ref 0–0)
PLATELET # BLD AUTO: 177 K/UL — SIGNIFICANT CHANGE UP (ref 150–400)
RBC # BLD: 4.53 M/UL — SIGNIFICANT CHANGE UP (ref 3.8–5.2)
RBC # FLD: 11.6 % — SIGNIFICANT CHANGE UP (ref 10.3–14.5)
WBC # BLD: 4.16 K/UL — SIGNIFICANT CHANGE UP (ref 3.8–10.5)
WBC # FLD AUTO: 4.16 K/UL — SIGNIFICANT CHANGE UP (ref 3.8–10.5)

## 2020-03-25 PROCEDURE — 99231 SBSQ HOSP IP/OBS SF/LOW 25: CPT

## 2020-03-25 RX ADMIN — Medication 1 MILLIGRAM(S): at 17:04

## 2020-03-25 RX ADMIN — OLANZAPINE 10 MILLIGRAM(S): 15 TABLET, FILM COATED ORAL at 17:04

## 2020-03-25 RX ADMIN — Medication 1 MILLIGRAM(S): at 09:10

## 2020-03-25 NOTE — PROGRESS NOTE BEHAVIORAL HEALTH - NSBHFUPINTERVALHXFT_PSY_A_CORE
Patient seen, evaluated and chart reviewed. Case discussed in tx team meeting and with Dr. Nicole. . No significant interval events are reported. Staff continues to encourage patient to go to groups and socialize with peers, and  reports she feels less anxious about groups and is going to some groups..  Verbalizes no paranoid thoughts about staff . No delusional thoughts are elicited.   She continues to report she feels happy  Reports rash is gone, and no rash is visible. .. Continues to ask appropriate questions about AOT, and how soon she will be going to her residence Informed patient that she now has AOT, as Dr. Beasley has been in contact with legal and was told this. , the residence has reported she can be transferred there on 3/27.  Patient reports she is ok with this.  Support provided.    Denies any SI or HI.  No Rx SE or sx TD/EPS are noted or reported.

## 2020-03-26 PROCEDURE — 99232 SBSQ HOSP IP/OBS MODERATE 35: CPT

## 2020-03-26 RX ADMIN — OLANZAPINE 10 MILLIGRAM(S): 15 TABLET, FILM COATED ORAL at 17:05

## 2020-03-26 RX ADMIN — Medication 1 MILLIGRAM(S): at 08:20

## 2020-03-26 RX ADMIN — Medication 1 MILLIGRAM(S): at 17:05

## 2020-03-26 NOTE — PROGRESS NOTE BEHAVIORAL HEALTH - NSBHFUPINTERVALHXFT_PSY_A_CORE
Patient seen, evaluated and chart reviewed. Case discussed in tx team meeting and with Dr. Nicole. . No significant interval events are reported. Staff continues to encourage patient to go to groups and socialize with peers, and  reports she feels less anxious about groups and is going to some groups..  Verbalizes no paranoid thoughts about staff . No delusional thoughts are elicited.   She continues to report she has a happy mood, but feels sad about leaving..  Patient reports she is ok with discharge tomorrow.  Support provided.    Denies any SI or HI.  No Rx SE or sx TD/EPS are noted or reported.

## 2020-03-27 VITALS
TEMPERATURE: 98 F | HEART RATE: 96 BPM | SYSTOLIC BLOOD PRESSURE: 109 MMHG | OXYGEN SATURATION: 98 % | RESPIRATION RATE: 17 BRPM | DIASTOLIC BLOOD PRESSURE: 66 MMHG

## 2020-03-27 PROCEDURE — 82962 GLUCOSE BLOOD TEST: CPT

## 2020-03-27 PROCEDURE — 84443 ASSAY THYROID STIM HORMONE: CPT

## 2020-03-27 PROCEDURE — 99239 HOSP IP/OBS DSCHRG MGMT >30: CPT

## 2020-03-27 PROCEDURE — 81001 URINALYSIS AUTO W/SCOPE: CPT

## 2020-03-27 PROCEDURE — 83036 HEMOGLOBIN GLYCOSYLATED A1C: CPT

## 2020-03-27 PROCEDURE — 93005 ELECTROCARDIOGRAM TRACING: CPT

## 2020-03-27 PROCEDURE — 36415 COLL VENOUS BLD VENIPUNCTURE: CPT

## 2020-03-27 PROCEDURE — 84702 CHORIONIC GONADOTROPIN TEST: CPT

## 2020-03-27 PROCEDURE — 85027 COMPLETE CBC AUTOMATED: CPT

## 2020-03-27 PROCEDURE — 80053 COMPREHEN METABOLIC PANEL: CPT

## 2020-03-27 PROCEDURE — 80048 BASIC METABOLIC PNL TOTAL CA: CPT

## 2020-03-27 RX ADMIN — Medication 1 MILLIGRAM(S): at 08:29

## 2020-03-27 NOTE — PROGRESS NOTE BEHAVIORAL HEALTH - RISK ASSESSMENT
Pt continues to have symptoms of psychosis but has no thoughts of harming herself or others.
Pt continues to have symptoms of psychosis but has no thoughts of harming herself or others.
Denies any SI or HI.  Risk factors:  chronic mental illness, paranoia, marital separation.   protective factors: IDs reasons to live, future oriented, fear of death and dying,
Pt continues to have symptoms of psychosis but has no thoughts of harming herself or others.
Denies any SI or HI.  Risk factors:  chronic mental illness, paranoia, marital separation.   protective factors: IDs reasons to live, future oriented, fear of death and dying,
Denies any SI or HI.  Risk factors:  chronic mental illness, paranoia, marital separation.   protective factors: IDs reasons to live, future oriented, fear of death and dying, will be living in supportive environment
Denies any SI or HI.  Risk factors:  chronic mental illness, paranoia, marital separation.   protective factors: IDs reasons to live, future oriented, fear of death and dying,
Denies any SI or HI.  Risk factors:  chronic mental illness, paranoia, marital separation.   protective factors: IDs reasons to live, future oriented, fear of death and dying, will be living in supportive environment

## 2020-03-27 NOTE — PROGRESS NOTE BEHAVIORAL HEALTH - PERCEPTIONS
Auditory hallucinations
No abnormalities

## 2020-03-27 NOTE — PROGRESS NOTE BEHAVIORAL HEALTH - NS ED BHA AXIS I PRIMARY CODE FT
F25.9

## 2020-03-27 NOTE — PROGRESS NOTE BEHAVIORAL HEALTH - NSBHLEGALSTATUS_PSY_A_CORE
9.13 (Voluntary)

## 2020-03-27 NOTE — PROGRESS NOTE BEHAVIORAL HEALTH - NSBHFUPINTERVALHXFT_PSY_A_CORE
Patient seen, evaluated and chart reviewed. Case discussed in tx team meeting and with Dr. Nicole.  All  members of team agree that patient is safe and appropriate for discharge . No significant interval events are reported. Staff continues to encourage patient to go to groups and socialize with peers, and  reports she feels less anxious about groups and is going to some groups.. Patient reports going to groups has helped her a lot, and she feels she learned a lot in groups.  . No delusional thoughts are elicited.   She continues to report she has a happy mood, but feels sad about leaving., but reports she is looking forward to her new life in her new home. .  Patient reports she is ok with discharge today. .  Support provided.    Denies any SI or HI.  No Rx SE or sx TD/EPS are noted or reported. Patient is stable and is not a danger to self or to others at this time.  Discharge to psychiatric residence today

## 2020-03-27 NOTE — PROGRESS NOTE BEHAVIORAL HEALTH - MOOD
Anxious
Normal
Anxious
Normal
Depressed
Normal
Normal

## 2020-03-27 NOTE — PROGRESS NOTE BEHAVIORAL HEALTH - PRIMARY DX
Schizoaffective disorder, unspecified type

## 2020-03-27 NOTE — PROGRESS NOTE BEHAVIORAL HEALTH - NSBHFUPINTERVALCCFT_PSY_A_CORE
" I feel happier than when I first came in here, and I am able to express my happiness more.  I learned so much here"

## 2020-03-27 NOTE — PROGRESS NOTE BEHAVIORAL HEALTH - AXIS III
none reported

## 2020-03-27 NOTE — PROGRESS NOTE BEHAVIORAL HEALTH - HYGIENE
Fair
Good

## 2020-03-27 NOTE — PROGRESS NOTE BEHAVIORAL HEALTH - NSBHCHARTREVIEWVS_PSY_A_CORE FT
Vital Signs Last 24 Hrs  T(C): --  T(F): --  HR: 108 (13 Feb 2020 16:34) (108 - 108)  BP: --  BP(mean): --  RR: --  SpO2: --
Vital Signs Last 24 Hrs  T(C): 36.3 (10 Mar 2020 07:34), Max: 36.3 (10 Mar 2020 07:34)  T(F): 97.4 (10 Mar 2020 07:34), Max: 97.4 (10 Mar 2020 07:34)  HR: 99 (10 Mar 2020 07:34) (96 - 99)  BP: 101/67 (10 Mar 2020 07:34) (101/67 - 108/75)  BP(mean): --  RR: 17 (10 Mar 2020 07:34) (16 - 17)  SpO2: 96% (10 Mar 2020 07:34) (96% - 96%)
Vital Signs Last 24 Hrs  T(C): 36.3 (25 Feb 2020 08:00), Max: 36.3 (25 Feb 2020 08:00)  T(F): 97.3 (25 Feb 2020 08:00), Max: 97.3 (25 Feb 2020 08:00)  HR: 81 (25 Feb 2020 08:00) (81 - 120)  BP: 119/79 (25 Feb 2020 08:00) (117/77 - 119/79)  BP(mean): --  RR: 17 (24 Feb 2020 16:42) (17 - 17)  SpO2: 96% (24 Feb 2020 16:42) (96% - 96%)
Vital Signs Last 24 Hrs  T(C): 36.3 (28 Feb 2020 07:35), Max: 36.3 (28 Feb 2020 07:35)  T(F): 97.4 (28 Feb 2020 07:35), Max: 97.4 (28 Feb 2020 07:35)  HR: 87 (28 Feb 2020 07:35) (87 - 87)  BP: 130/83 (28 Feb 2020 07:35) (130/83 - 130/83)  BP(mean): --  RR: 17 (28 Feb 2020 07:35) (17 - 17)  SpO2: 99% (28 Feb 2020 07:35) (99% - 99%)
Vital Signs Last 24 Hrs  T(C): 36.4 (02 Mar 2020 08:59), Max: 36.4 (02 Mar 2020 08:59)  T(F): 97.5 (02 Mar 2020 08:59), Max: 97.5 (02 Mar 2020 08:59)  HR: 17 (02 Mar 2020 16:12) (17 - 121)  BP: 125/88 (02 Mar 2020 16:12) (108/67 - 125/88)  BP(mean): --  RR: 18 (02 Mar 2020 16:12) (18 - 20)  SpO2: 97% (02 Mar 2020 16:12) (95% - 99%)
Vital Signs Last 24 Hrs  T(C): 36.4 (13 Mar 2020 08:09), Max: 36.4 (13 Mar 2020 08:09)  T(F): 97.6 (13 Mar 2020 08:09), Max: 97.6 (13 Mar 2020 08:09)  HR: 120 (13 Mar 2020 08:09) (120 - 120)  BP: 93/71 (13 Mar 2020 08:09) (93/71 - 93/71)  BP(mean): --  RR: 18 (13 Mar 2020 16:19) (17 - 18)  SpO2: 96% (13 Mar 2020 16:19) (96% - 97%)
Vital Signs Last 24 Hrs  T(C): 36.4 (18 Feb 2020 07:58), Max: 36.4 (18 Feb 2020 07:58)  T(F): 97.6 (18 Feb 2020 07:58), Max: 97.6 (18 Feb 2020 07:58)  HR: 98 (18 Feb 2020 07:58) (87 - 98)  BP: 99/73 (18 Feb 2020 07:58) (99/73 - 118/78)  BP(mean): --  RR: 18 (18 Feb 2020 07:58) (18 - 18)  SpO2: 100% (18 Feb 2020 07:58) (97% - 100%))
Vital Signs Last 24 Hrs  T(C): 36.4 (18 Mar 2020 08:35), Max: 36.4 (18 Mar 2020 08:35)  T(F): 97.6 (18 Mar 2020 08:35), Max: 97.6 (18 Mar 2020 08:35)  HR: 98 (18 Mar 2020 08:35) (79 - 98)  BP: 96/71 (18 Mar 2020 08:35) (96/71 - 106/75)  BP(mean): --  RR: 17 (18 Mar 2020 08:35) (17 - 117)  SpO2: 97% (18 Mar 2020 08:35) (97% - 97%)
Vital Signs Last 24 Hrs  T(C): 36.4 (19 Feb 2020 08:16), Max: 36.4 (19 Feb 2020 08:16)  T(F): 97.5 (19 Feb 2020 08:16), Max: 97.5 (19 Feb 2020 08:16)  HR: 71 (19 Feb 2020 16:59) (71 - 71)  BP: 110/71 (19 Feb 2020 16:59) (110/71 - 110/71)  BP(mean): --  RR: 20 (19 Feb 2020 16:59) (18 - 20)  SpO2: 97% (19 Feb 2020 16:59) (97% - 98%)))
Vital Signs Last 24 Hrs  T(C): 36.4 (21 Feb 2020 08:52), Max: 36.4 (21 Feb 2020 08:52)  T(F): 97.5 (21 Feb 2020 08:52), Max: 97.5 (21 Feb 2020 08:52)  HR: 107 (21 Feb 2020 16:22) (100 - 107)  BP: 119/74 (21 Feb 2020 16:22) (119/74 - 137/81)  BP(mean): --  RR: 18 (21 Feb 2020 16:22) (18 - 18)  SpO2: 98% (21 Feb 2020 16:22) (98% - 98%)
Vital Signs Last 24 Hrs  T(C): 36.4 (26 Feb 2020 07:32), Max: 36.4 (26 Feb 2020 07:32)  T(F): 97.5 (26 Feb 2020 07:32), Max: 97.5 (26 Feb 2020 07:32)  HR: 93 (26 Feb 2020 07:32) (93 - 100)  BP: 105/76 (26 Feb 2020 07:32) (105/76 - 110/78)  BP(mean): --  RR: 16 (26 Feb 2020 07:32) (16 - 20)  SpO2: 94% (26 Feb 2020 07:32) (94% - 99%)
Vital Signs Last 24 Hrs  T(C): 36.4 (27 Feb 2020 07:30), Max: 36.9 (26 Feb 2020 18:25)  T(F): 97.6 (27 Feb 2020 07:30), Max: 98.5 (26 Feb 2020 18:25)  HR: 114 (27 Feb 2020 07:30) (85 - 114)  BP: 119/82 (27 Feb 2020 07:30) (119/82 - 123/83)  BP(mean): --  RR: 16 (27 Feb 2020 07:30) (16 - 16)  SpO2: 98% (27 Feb 2020 07:30) (98% - 98%))
Vital Signs Last 24 Hrs  T(C): 36.5 (15 Feb 2020 08:20), Max: 36.5 (15 Feb 2020 08:20)  T(F): 97.7 (15 Feb 2020 08:20), Max: 97.7 (15 Feb 2020 08:20)  HR: 93 (15 Feb 2020 08:20) (93 - 93)  BP: 122/78 (15 Feb 2020 08:20) (122/78 - 122/78)  BP(mean): --  RR: 18 (15 Feb 2020 08:20) (18 - 18)  SpO2: 100% (15 Feb 2020 08:20) (100% - 100%)
Vital Signs Last 24 Hrs  T(C): 36.5 (25 Mar 2020 07:33), Max: 36.9 (24 Mar 2020 16:30)  T(F): 97.7 (25 Mar 2020 07:33), Max: 98.4 (24 Mar 2020 16:30)  HR: 123 (25 Mar 2020 07:33) (64 - 123)  BP: 100/71 (25 Mar 2020 07:33) (100/68 - 100/71)  BP(mean): --  RR: 17 (25 Mar 2020 07:33) (17 - 17)  SpO2: 97% (25 Mar 2020 07:33) (97% - 99%)
Vital Signs Last 24 Hrs  T(C): 36.6 (04 Mar 2020 07:30), Max: 36.6 (04 Mar 2020 07:30)  T(F): 97.9 (04 Mar 2020 07:30), Max: 97.9 (04 Mar 2020 07:30)  HR: 132 (04 Mar 2020 07:30) (100 - 132)  BP: 92/61 (04 Mar 2020 07:30) (92/61 - 93/74)  BP(mean): --  RR: 16 (04 Mar 2020 07:30) (16 - 20)  SpO2: 98% (04 Mar 2020 07:30) (98% - 98%)
Vital Signs Last 24 Hrs  T(C): 36.6 (09 Mar 2020 07:35), Max: 36.6 (09 Mar 2020 07:35)  T(F): 97.9 (09 Mar 2020 07:35), Max: 97.9 (09 Mar 2020 07:35)  HR: 84 (09 Mar 2020 11:32) (82 - 84)  BP: 113/78 (09 Mar 2020 11:32) (113/78 - 114/78)  BP(mean): --  RR: 16 (09 Mar 2020 07:35) (16 - 16)  SpO2: 98% (09 Mar 2020 07:35) (98% - 98%)
Vital Signs Last 24 Hrs  T(C): 36.6 (24 Feb 2020 07:35), Max: 36.6 (24 Feb 2020 07:35)  T(F): 97.9 (24 Feb 2020 07:35), Max: 97.9 (24 Feb 2020 07:35)  HR: 113 (24 Feb 2020 07:35) (113 - 113)  BP: 116/69 (24 Feb 2020 07:35) (116/69 - 116/69)  BP(mean): --  RR: 16 (24 Feb 2020 07:35) (16 - 16)  SpO2: 98% (24 Feb 2020 07:35) (98% - 98%)
Vital Signs Last 24 Hrs  T(C): 36.6 (26 Mar 2020 07:37), Max: 36.6 (26 Mar 2020 07:37)  T(F): 97.8 (26 Mar 2020 07:37), Max: 97.8 (26 Mar 2020 07:37)  HR: 120 (26 Mar 2020 07:37) (120 - 120)  BP: 87/61 (26 Mar 2020 07:37) (87/61 - 87/61)  BP(mean): --  RR: 17 (26 Mar 2020 07:37) (17 - 17)  SpO2: 98% (26 Mar 2020 07:37) (98% - 98%)
Vital Signs Last 24 Hrs  T(C): 36.7 (03 Mar 2020 07:35), Max: 36.7 (03 Mar 2020 07:35)  T(F): 98 (03 Mar 2020 07:35), Max: 98 (03 Mar 2020 07:35)  HR: 121 (03 Mar 2020 07:35) (17 - 121)  BP: 98/60 (03 Mar 2020 07:35) (98/60 - 125/88)  BP(mean): --  RR: 16 (03 Mar 2020 07:35) (16 - 18)  SpO2: 95% (03 Mar 2020 07:35) (95% - 97%)
Vital Signs Last 24 Hrs  T(C): 36.7 (16 Mar 2020 07:30), Max: 36.7 (16 Mar 2020 07:30)  T(F): 98.1 (16 Mar 2020 07:30), Max: 98.1 (16 Mar 2020 07:30)  HR: 100 (16 Mar 2020 07:30) (95 - 100)  BP: 102/70 (16 Mar 2020 07:30) (102/70 - 111/75)  BP(mean): --  RR: 17 (16 Mar 2020 07:30) (17 - 18)  SpO2: 98% (16 Mar 2020 07:30) (98% - 98%)
Vital Signs Last 24 Hrs  T(C): 36.7 (17 Mar 2020 07:35), Max: 36.7 (17 Mar 2020 07:35)  T(F): 98.1 (17 Mar 2020 07:35), Max: 98.1 (17 Mar 2020 07:35)  HR: 116 (17 Mar 2020 07:35) (105 - 116)  BP: 128/84 (17 Mar 2020 07:35) (109/77 - 128/84)  BP(mean): --  RR: 17 (17 Mar 2020 07:35) (17 - 17)  SpO2: 96% (17 Mar 2020 07:35) (96% - 99%)
Vital Signs Last 24 Hrs  T(C): 36.7 (19 Mar 2020 08:26), Max: 36.7 (19 Mar 2020 08:26)  T(F): 98 (19 Mar 2020 08:26), Max: 98 (19 Mar 2020 08:26)  HR: 107 (19 Mar 2020 08:26) (91 - 107)  BP: 90/66 (19 Mar 2020 08:26) (90/66 - 99/74)  BP(mean): --  RR: 18 (19 Mar 2020 08:26) (18 - 18)  SpO2: 98% (19 Mar 2020 08:26) (98% - 98%)
Vital Signs Last 24 Hrs  T(C): 36.7 (23 Mar 2020 08:26), Max: 36.7 (22 Mar 2020 16:07)  T(F): 98.1 (23 Mar 2020 08:26), Max: 98.1 (23 Mar 2020 08:26)  HR: 99 (23 Mar 2020 08:26) (99 - 106)  BP: 86/58 (23 Mar 2020 08:26) (86/58 - 103/68)  BP(mean): --  RR: 17 (23 Mar 2020 08:26) (17 - 17)  SpO2: 97% (23 Mar 2020 08:26) (97% - 99%)
Vital Signs Last 24 Hrs  T(C): 36.7 (27 Mar 2020 07:36), Max: 36.7 (27 Mar 2020 07:36)  T(F): 98 (27 Mar 2020 07:36), Max: 98 (27 Mar 2020 07:36)  HR: 96 (27 Mar 2020 07:36) (96 - 96)  BP: 109/66 (27 Mar 2020 07:36) (109/66 - 109/66)  BP(mean): --  RR: 17 (27 Mar 2020 07:36) (17 - 17)  SpO2: 98% (27 Mar 2020 07:36) (98% - 98%)
Vital Signs Last 24 Hrs  T(C): 36.8 (11 Mar 2020 08:00), Max: 36.8 (11 Mar 2020 08:00)  T(F): 98.2 (11 Mar 2020 08:00), Max: 98.2 (11 Mar 2020 08:00)  HR: 81 (11 Mar 2020 16:30) (81 - 124)  BP: 108/74 (11 Mar 2020 16:30) (83/64 - 108/74)  BP(mean): --  RR: 17 (11 Mar 2020 16:30) (17 - 18)  SpO2: 99% (11 Mar 2020 16:30) (99% - 99%)
Vital Signs Last 24 Hrs  T(C): 36.8 (12 Mar 2020 07:33), Max: 36.8 (12 Mar 2020 07:33)  T(F): 98.2 (12 Mar 2020 07:33), Max: 98.2 (12 Mar 2020 07:33)  HR: 115 (12 Mar 2020 07:33) (81 - 115)  BP: 112/82 (12 Mar 2020 07:33) (108/74 - 112/82)  BP(mean): --  RR: 18 (12 Mar 2020 07:33) (17 - 18)  SpO2: 99% (12 Mar 2020 07:33) (99% - 99%)
Vital Signs Last 24 Hrs  T(C): 36.8 (13 Feb 2020 08:00), Max: 36.8 (13 Feb 2020 08:00)  T(F): 98.3 (13 Feb 2020 08:00), Max: 98.3 (13 Feb 2020 08:00)  HR: 120 (13 Feb 2020 08:00) (120 - 120)  BP: 107/71 (13 Feb 2020 08:00) (107/71 - 107/71)  BP(mean): --  RR: 17 (13 Feb 2020 08:00) (17 - 17)  SpO2: 99% (13 Feb 2020 08:00) (99% - 99%)
Vital Signs Last 24 Hrs  T(C): 36.9 (20 Feb 2020 07:34), Max: 36.9 (20 Feb 2020 07:34)  T(F): 98.4 (20 Feb 2020 07:34), Max: 98.4 (20 Feb 2020 07:34)  HR: 93 (20 Feb 2020 07:34) (71 - 93)  BP: 122/84 (20 Feb 2020 07:34) (110/71 - 122/84)  BP(mean): --  RR: 17 (20 Feb 2020 07:34) (17 - 20)  SpO2: 99% (20 Feb 2020 07:34) (97% - 99%)
Vital Signs Last 24 Hrs  T(C): 36.9 (20 Mar 2020 07:34), Max: 36.9 (20 Mar 2020 07:34)  T(F): 98.4 (20 Mar 2020 07:34), Max: 98.4 (20 Mar 2020 07:34)  HR: 112 (20 Mar 2020 07:34) (108 - 112)  BP: 107/72 (20 Mar 2020 07:34) (107/72 - 114/77)  BP(mean): --  RR: 18 (20 Mar 2020 07:34) (18 - 18)  SpO2: 97% (20 Mar 2020 07:34) (96% - 97%)
Vital Signs Last 24 Hrs  T(C): 36.9 (24 Mar 2020 16:30), Max: 36.9 (24 Mar 2020 16:30)  T(F): 98.4 (24 Mar 2020 16:30), Max: 98.4 (24 Mar 2020 16:30)  HR: 64 (24 Mar 2020 16:30) (64 - 94)  BP: 100/68 (24 Mar 2020 16:30) (100/68 - 115/84)  BP(mean): --  RR: 17 (24 Mar 2020 16:30) (17 - 17)  SpO2: 99% (24 Mar 2020 16:30) (99% - 99%)
Vital Signs Last 24 Hrs  T(C): 36.7 (06 Mar 2020 08:35), Max: 36.7 (06 Mar 2020 08:35)  T(F): 98.1 (06 Mar 2020 08:35), Max: 98.1 (06 Mar 2020 08:35)  HR: 124 (06 Mar 2020 08:35) (115 - 124)  BP: 94/71 (06 Mar 2020 08:35) (94/71 - 134/85)  BP(mean): --  RR: 18 (06 Mar 2020 08:35) (18 - 18)  SpO2: 100% (06 Mar 2020 08:35) (98% - 100%)
Vital Signs Last 24 Hrs  T(C): 36.8 (05 Mar 2020 08:31), Max: 36.8 (05 Mar 2020 08:31)  T(F): 98.2 (05 Mar 2020 08:31), Max: 98.2 (05 Mar 2020 08:31)  HR: 80 (05 Mar 2020 08:31) (80 - 113)  BP: 119/84 (05 Mar 2020 08:31) (117/84 - 119/84)  BP(mean): --  RR: 16 (05 Mar 2020 08:31) (16 - 18)  SpO2: 99% (05 Mar 2020 08:31) (97% - 99%)

## 2020-03-27 NOTE — PROGRESS NOTE BEHAVIORAL HEALTH - SUMMARY
Pt is a 24 yr old woman who states that she has a hx of Schizoaffective disorder, and has been unemployed, homeless, and was  from her  of 3 years ( in 2017).  She was most recently at [facility] in 12/10-12/27/19 and at [facility] from 10/11/19 to 11/4/2019 and says that the Zyprexa 20mg is not working.  She presented to [facility] with worsening psychosis and suicidal and homicidal ideation threatening to "kill people" because they are threatening to kill her first.  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  She says that she  a man from [place] in 2017 and converted to Religious, but has  from him now and had been homeless until one week ago, but says that his family was concerned and got her an apartment.  She became concerned that her apartment was being searched and people were "playing games" and trying to "kill me."  She denies any current substance use and says she will not take Zyprexa because it does not work.  She says she was raised by her grandparents in [place], but feels that they would not be able to help her now.  She says that her own parents live in [place] but she does not have their contact information.  She denies current suicidal or homicidal thoughts.  She appears to be responding to internal stimuli, cursing in the hallway and appears paranoid. She refused medications yesterday but was able to discuss switching to Risperidone and Invega Sustenna but is now refusing.   PT now says she wants to take Zyprexa again and was prescribed again.
Pt is a 24 yr old woman who states that she has a hx of Schizoaffective disorder, and has been unemployed, homeless, and was  from her  of 3 years ( in 2017).  She was most recently at [facility] in 12/10-12/27/19 and at [facility] from 10/11/19 to 11/4/2019 and says that the Zyprexa 20mg is not working.  She presented to [facility] with worsening psychosis and suicidal and homicidal ideation threatening to "kill people" because they are threatening to kill her first.  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  She says that she  a man from [place] in 2017 and converted to Zoroastrianism, but has  from him now and had been homeless until one week ago, but says that his family was concerned and got her an apartment.  She became concerned that her apartment was being searched and people were "playing games" and trying to "kill me."  She denies any current substance use and says she will not take Zyprexa because it does not work.  She says she was raised by her grandparents in [place], but feels that they would not be able to help her now.  She says that her own parents live in [place] but she does not have their contact information.  She denies current suicidal or homicidal thoughts.  She appears to be responding to internal stimuli, cursing in the hallway and appears paranoid. She was able to take her Olanzapine 15mg last night but refused to consider a long acting injectable medication.  Pt will continue Olanzapine 15mg po qhs.
Pt is a 24 yr old woman who states that she has a hx of Schizoaffective disorder, and has been unemployed, homeless, and was  from her  of 3 years ( in 2017).  She was most recently at [facility] in 12/10-12/27/19 and at [facility] from 10/11/19 to 11/4/2019 and says that the Zyprexa 20mg is not working.  She presented to [facility] with worsening psychosis and suicidal and homicidal ideation threatening to "kill people" because they are threatening to kill her first.  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  She says that she  a man from [place] in 2017 and converted to Evangelical, but has  from him now and had been homeless until one week ago, but says that his family was concerned and got her an apartment.  She became concerned that her apartment was being searched and people were "playing games" and trying to "kill me."  She denies any current substance use and says she will not take Zyprexa because it does not work.  She says she was raised by her grandparents in [place], but feels that they would not be able to help her now.  She says that her own parents live in [place] but she does not have their contact information.  She denies current suicidal or homicidal thoughts.  She appears to be responding to internal stimuli, cursing in the hallway and appears paranoid. She was able to take her Olanzapine 15mg last night but refused to consider a long acting injectable medication.  Pt will continue Olanzapine 15mg po qhs.
Pt is a 24 yr old woman who states that she has a hx of Schizoaffective disorder, and has been unemployed, homeless, and was  from her  of 3 years ( in 2017).  She was most recently at [facility] in 12/10-12/27/19 and at [facility] from 10/11/19 to 11/4/2019 and says that the Zyprexa 20mg is not working.  She presented to [facility] with worsening psychosis and suicidal and homicidal ideation threatening to "kill people" because they are threatening to kill her first.  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  She says that she  a man from [place] in 2017 and converted to Episcopal, but has  from him now and had been homeless until one week ago, but says that his family was concerned and got her an apartment.  She became concerned that her apartment was being searched and people were "playing games" and trying to "kill me."  She denies any current substance use and says she will not take Zyprexa because it does not work.  She says she was raised by her grandparents in [place], but feels that they would not be able to help her now.  She says that her own parents live in [place] but she does not have their contact information.  She denies current suicidal or homicidal thoughts.  She appears to be responding to internal stimuli, cursing in the hallway and appears paranoid. She was able to take her Olanzapine 15mg last night but refused to consider a long acting injectable medication.  Pt will continue Olanzapine 5mg qam and 10mg hs
Pt is a 24 yr old woman who states that she has a hx of Schizoaffective disorder, and has been unemployed, homeless, and was  from her  of 3 years ( in 2017).  She was most recently at [facility] 12/10[facility]12/27/19 and at [facility] from 10/11/19 to 11/4/2019 and says that the Zyprexa 20mg is not working.  She presented to [facility] with worsening psychosis and suicidal and homicidal ideation threatening to "kill people" because they are threatening to kill her first.  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  She says that she  a man from [place] in 2017 and converted to Worship, but has  from him now and had been homeless until one week ago, but says that his family was concerned and got her an apartment.  She became concerned that her apartment was being searched and people were "playing games" and trying to "kill me."  She denies any current substance use and says she will not take Zyprexa because it does not work.  She says she was raised by her grandparents in [place], but feels that they would not be able to help her now.  She says that her own parents live in [place] but she does not have their contact information.  She denies current suicidal or homicidal thoughts.  She appears to be responding to internal stimuli, cursing in the hallway and appears paranoid. She was able to take her Olanzapine 15mg last night but refused to consider a long acting injectable medication.  Pt will continue Olanzapine 15mg po qhs.
Pt is a 24 yr old woman who states that she has a hx of Schizoaffective disorder, and has been unemployed, homeless, and was  from her  of 3 years ( in 2017).  She was most recently at [facility] in 12/10-12/27/19 and at [facility] from 10/11/19 to 11/4/2019 and says that the Zyprexa 20mg is not working.  She presented to [facility] with worsening psychosis and suicidal and homicidal ideation threatening to "kill people" because they are threatening to kill her first.  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  She says that she  a man from [place] in 2017 and converted to Restorationism, but has  from him now and had been homeless until one week ago, but says that his family was concerned and got her an apartment.  She became concerned that her apartment was being searched and people were "playing games" and trying to "kill me."  She denies any current substance use and says she will not take Zyprexa because it does not work.  She says she was raised by her grandparents in [place], but feels that they would not be able to help her now.  She says that her own parents live in [place] but she does not have their contact information.  She denies current suicidal or homicidal thoughts.  She appears to be responding to internal stimuli, cursing in the hallway and appears paranoid. She was able to take her Olanzapine 15mg last night but refused to consider a long acting injectable medication.  Pt will continue Olanzapine 5mg qam and 10mg hs
Pt is a 24 yr old woman who states that she has a hx of Schizoaffective disorder, and has been unemployed, homeless, and was  from her  of 3 years ( in 2017).  She was most recently at [facility] in 12/10-12/27/19 and at [facility] from 10/11/19 to 11/4/2019 and says that the Zyprexa 20mg is not working.  She presented to [facility] with worsening psychosis and suicidal and homicidal ideation threatening to "kill people" because they are threatening to kill her first.  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  She says that she  a man from [place] in 2017 and converted to Methodist, but has  from him now and had been homeless until one week ago, but says that his family was concerned and got her an apartment.  She became concerned that her apartment was being searched and people were "playing games" and trying to "kill me."  She denies any current substance use and says she will not take Zyprexa because it does not work.  She says she was raised by her grandparents in [place], but feels that they would not be able to help her now.  She says that her own parents live in [place] but she does not have their contact information.  She denies current suicidal or homicidal thoughts.  She appears to be responding to internal stimuli, cursing in the hallway and appears paranoid. She was able to take her Olanzapine 15mg last night but refused to consider a long acting injectable medication.  Pt will continue Olanzapine 5mg qam and 10mg hs
Pt is a 24 yr old woman who states that she has a hx of Schizoaffective disorder, and has been unemployed, homeless, and was  from her  of 3 years ( in 2017).  She was most recently at [facility] in 12/10-12/27/19 and at [facility] from 10/11/19 to 11/4/2019 and says that the Zyprexa 20mg is not working.  She presented to [facility] with worsening psychosis and suicidal and homicidal ideation threatening to "kill people" because they are threatening to kill her first.  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  She says that she  a man from [place] in 2017 and converted to Cheondoism, but has  from him now and had been homeless until one week ago, but says that his family was concerned and got her an apartment.  She became concerned that her apartment was being searched and people were "playing games" and trying to "kill me."  She denies any current substance use and says she will not take Zyprexa because it does not work.  She says she was raised by her grandparents in [place], but feels that they would not be able to help her now.  She says that her own parents live in [place] but she does not have their contact information.  She denies current suicidal or homicidal thoughts.  She appears to be responding to internal stimuli, cursing in the hallway and appears paranoid. She was able to take her Olanzapine 15mg last night but refused to consider a long acting injectable medication.  Pt will continue Olanzapine 5mg qam and 10mg hs
Pt is a 24 yr old woman who states that she has a hx of Schizoaffective disorder, and has been unemployed, homeless, and was  from her  of 3 years ( in 2017).  She was most recently at [facility] in 12/10-12/27/19 and at [facility] from 10/11/19 to 11/4/2019 and says that the Zyprexa 20mg is not working.  She presented to [facility] with worsening psychosis and suicidal and homicidal ideation threatening to "kill people" because they are threatening to kill her first.  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  She says that she  a man from [place] in 2017 and converted to Sikh, but has  from him now and had been homeless until one week ago, but says that his family was concerned and got her an apartment.  She became concerned that her apartment was being searched and people were "playing games" and trying to "kill me."  She denies any current substance use and says she will not take Zyprexa because it does not work.  She says she was raised by her grandparents in [place], but feels that they would not be able to help her now.  She says that her own parents live in [place] but she does not have their contact information.  She denies current suicidal or homicidal thoughts.  She appears to be responding to internal stimuli, cursing in the hallway and appears paranoid. She was able to take her Olanzapine 15mg last night but refused to consider a long acting injectable medication.  Pt will continue Olanzapine 5mg qam and 10mg hs
Pt is a 24 yr old woman who states that she has a hx of Schizoaffective disorder, and has been unemployed, homeless, and was  from her  of 3 years ( in 2017).  She was most recently at [facility] in 12/10-12/27/19 and at [facility] from 10/11/19 to 11/4/2019 and says that the Zyprexa 20mg is not working.  She presented to [facility] with worsening psychosis and suicidal and homicidal ideation threatening to "kill people" because they are threatening to kill her first.  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  She says that she  a man from [place] in 2017 and converted to Sikh, but has  from him now and had been homeless until one week ago, but says that his family was concerned and got her an apartment.  She became concerned that her apartment was being searched and people were "playing games" and trying to "kill me."  She denies any current substance use and says she will not take Zyprexa because it does not work.  She says she was raised by her grandparents in [place], but feels that they would not be able to help her now.  She says that her own parents live in [place] but she does not have their contact information.  She denies current suicidal or homicidal thoughts.  She appears to be responding to internal stimuli, cursing in the hallway and appears paranoid. She was able to take her Olanzapine 15mg last night but refused to consider a long acting injectable medication.  Pt will continue Olanzapine 5mg qam and 10mg hs
Pt is a 24 yr old woman who states that she has a hx of Schizoaffective disorder, and has been unemployed, homeless, and was  from her  of 3 years ( in 2017).  She was most recently at [facility] in 12/10-12/27/19 and at [facility] from 10/11/19 to 11/4/2019 and says that the Zyprexa 20mg is not working.  She presented to [facility] with worsening psychosis and suicidal and homicidal ideation threatening to "kill people" because they are threatening to kill her first.  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  She says that she  a man from [place] in 2017 and converted to Yarsanism, but has  from him now and had been homeless until one week ago, but says that his family was concerned and got her an apartment.  She became concerned that her apartment was being searched and people were "playing games" and trying to "kill me."  She denies any current substance use and says she will not take Zyprexa because it does not work.  She says she was raised by her grandparents in [place], but feels that they would not be able to help her now.  She says that her own parents live in [place] but she does not have their contact information.  She denies current suicidal or homicidal thoughts.  She appears to be responding to internal stimuli, cursing in the hallway and appears paranoid. She was able to take her Olanzapine 15mg last night but refused to consider a long acting injectable medication.  Pt will continue Olanzapine 5mg qam and 10mg hs
Pt is a 24 yr old woman who states that she has a hx of Schizoaffective disorder, and has been unemployed, homeless, and was  from her  of 3 years ( in 2017).  She was most recently at [facility] in 12/10-12/27/19 and at [facility] from 10/11/19 to 11/4/2019 and says that the Zyprexa 20mg is not working.  She presented to [facility] with worsening psychosis and suicidal and homicidal ideation threatening to "kill people" because they are threatening to kill her first.  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  She says that she  a man from [place] in 2017 and converted to Protestant, but has  from him now and had been homeless until one week ago, but says that his family was concerned and got her an apartment.  She became concerned that her apartment was being searched and people were "playing games" and trying to "kill me."  She denies any current substance use and says she will not take Zyprexa because it does not work.  She says she was raised by her grandparents in [place], but feels that they would not be able to help her now.  She says that her own parents live in [place] but she does not have their contact information.  She denies current suicidal or homicidal thoughts.  She appears to be responding to internal stimuli, cursing in the hallway and appears paranoid. She was able to take her Olanzapine 15mg last night but refused to consider a long acting injectable medication.  Pt will continue Olanzapine 5mg qam and 10mg hs
Pt is a 24 yr old woman who states that she has a hx of Schizoaffective disorder, and has been unemployed, homeless, and was  from her  of 3 years ( in 2017).  She was most recently at [facility] in 12/10-12/27/19 and at [facility] from 10/11/19 to 11/4/2019 and says that the Zyprexa 20mg is not working.  She presented to [facility] with worsening psychosis and suicidal and homicidal ideation threatening to "kill people" because they are threatening to kill her first.  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  She says that she  a man from [place] in 2017 and converted to Rastafarian, but has  from him now and had been homeless until one week ago, but says that his family was concerned and got her an apartment.  She became concerned that her apartment was being searched and people were "playing games" and trying to "kill me."  She denies any current substance use and says she will not take Zyprexa because it does not work.  She says she was raised by her grandparents in [place], but feels that they would not be able to help her now.  She says that her own parents live in [place] but she does not have their contact information.  She denies current suicidal or homicidal thoughts.  She appears to be responding to internal stimuli, cursing in the hallway and appears paranoid. She was able to take her Olanzapine 15mg last night but refused to consider a long acting injectable medication.  Pt will continue Olanzapine 5mg qam and 10mg hs
Pt is a 24 yr old woman who states that she has a hx of Schizoaffective disorder, and has been unemployed, homeless, and was  from her  of 3 years ( in 2017).  She was most recently at [facility] in 12/10-12/27/19 and at [facility] from 10/11/19 to 11/4/2019 and says that the Zyprexa 20mg is not working.  She presented to [facility] with worsening psychosis and suicidal and homicidal ideation threatening to "kill people" because they are threatening to kill her first.  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  She says that she  a man from [place] in 2017 and converted to Holiness, but has  from him now and had been homeless until one week ago, but says that his family was concerned and got her an apartment.  She became concerned that her apartment was being searched and people were "playing games" and trying to "kill me."  She denies any current substance use and says she will not take Zyprexa because it does not work.  She says she was raised by her grandparents in [place], but feels that they would not be able to help her now.  She says that her own parents live in [place] but she does not have their contact information.  She denies current suicidal or homicidal thoughts.  She appears to be responding to internal stimuli, cursing in the hallway and appears paranoid. She was able to take her Olanzapine 15mg last night but refused to consider a long acting injectable medication.  Pt will continue Olanzapine 5mg qam and 10mg hs
Pt is a 24 yr old woman who states that she has a hx of Schizoaffective disorder, and has been unemployed, homeless, and was  from her  of 3 years ( in 2017).  She was most recently at [facility] in 12/10-12/27/19 and at [facility] from 10/11/19 to 11/4/2019 and says that the Zyprexa 20mg is not working.  She presented to [facility] with worsening psychosis and suicidal and homicidal ideation threatening to "kill people" because they are threatening to kill her first.  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  She says that she  a man from [place] in 2017 and converted to Quaker, but has  from him now and had been homeless until one week ago, but says that his family was concerned and got her an apartment.  She became concerned that her apartment was being searched and people were "playing games" and trying to "kill me."  She denies any current substance use and says she will not take Zyprexa because it does not work.  She says she was raised by her grandparents in [place], but feels that they would not be able to help her now.  She says that her own parents live in [place] but she does not have their contact information.  She denies current suicidal or homicidal thoughts.  She appears to be responding to internal stimuli, cursing in the hallway and appears paranoid. She was able to take her Olanzapine 15mg last night but refused to consider a long acting injectable medication.  Pt will continue Olanzapine 5mg qam and 10mg hs
Pt is a 24 yr old woman who states that she has a hx of Schizoaffective disorder, and has been unemployed, homeless, and was  from her  of 3 years ( in 2017).  She was most recently at [facility] in 12/10-12/27/19 and at [facility] from 10/11/19 to 11/4/2019 and says that the Zyprexa 20mg is not working.  She presented to [facility] with worsening psychosis and suicidal and homicidal ideation threatening to "kill people" because they are threatening to kill her first.  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  She says that she  a man from [place] in 2017 and converted to Restorationist, but has  from him now and had been homeless until one week ago, but says that his family was concerned and got her an apartment.  She became concerned that her apartment was being searched and people were "playing games" and trying to "kill me."  She denies any current substance use and says she will not take Zyprexa because it does not work.  She says she was raised by her grandparents in [place], but feels that they would not be able to help her now.  She says that her own parents live in [place] but she does not have their contact information.  She denies current suicidal or homicidal thoughts.  She appears to be responding to internal stimuli, cursing in the hallway and appears paranoid. She was able to take her Olanzapine 15mg last night but refused to consider a long acting injectable medication.  Pt will continue Olanzapine 5mg qam and 10mg hs.  zoloft added due to c/o depression  Zoloft DC and Zyprexa lowered to 10mg, begin Ativan bid due to c/o about ongoing anxiety
Pt is a 24 yr old woman who states that she has a hx of Schizoaffective disorder, and has been unemployed, homeless, and was  from her  of 3 years ( in 2017).  She was most recently at [facility] in 12/10-12/27/19 and at [facility] from 10/11/19 to 11/4/2019 and says that the Zyprexa 20mg is not working.  She presented to [facility] with worsening psychosis and suicidal and homicidal ideation threatening to "kill people" because they are threatening to kill her first.  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  She says that she  a man from [place] in 2017 and converted to Rastafari, but has  from him now and had been homeless until one week ago, but says that his family was concerned and got her an apartment.  She became concerned that her apartment was being searched and people were "playing games" and trying to "kill me."  She denies any current substance use and says she will not take Zyprexa because it does not work.  She says she was raised by her grandparents in [place], but feels that they would not be able to help her now.  She says that her own parents live in [place] but she does not have their contact information.  She denies current suicidal or homicidal thoughts.  She appears to be responding to internal stimuli, cursing in the hallway and appears paranoid. She was able to take her Olanzapine 15mg last night but refused to consider a long acting injectable medication.  Pt will continue Olanzapine 5mg qam and 10mg hs.  zoloft added due to c/o depression  Zoloft DC and Zyprexa lowered to 10mg, begin Ativan bid due to c/o about ongoing anxiety
Pt is a 24 yr old woman who states that she has a hx of Schizoaffective disorder, and has been unemployed, homeless, and was  from her  of 3 years ( in 2017).  She was most recently at [facility] in 12/10-12/27/19 and at [facility] from 10/11/19 to 11/4/2019 and says that the Zyprexa 20mg is not working.  She presented to [facility] with worsening psychosis and suicidal and homicidal ideation threatening to "kill people" because they are threatening to kill her first.  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  She says that she  a man from [place] in 2017 and converted to Anglican, but has  from him now and had been homeless until one week ago, but says that his family was concerned and got her an apartment.  She became concerned that her apartment was being searched and people were "playing games" and trying to "kill me."  She denies any current substance use and says she will not take Zyprexa because it does not work.  She says she was raised by her grandparents in [place], but feels that they would not be able to help her now.  She says that her own parents live in [place] but she does not have their contact information.  She denies current suicidal or homicidal thoughts.  She appears to be responding to internal stimuli, cursing in the hallway and appears paranoid. She was able to take her Olanzapine 15mg last night but refused to consider a long acting injectable medication.  Pt will continue Olanzapine 5mg qam and 10mg hs.  zoloft added due to c/o depression  Zoloft DC and Zyprexa lowered to 10mg, begin Ativan bid due to c/o about ongoing anxiety
Pt is a 24 yr old woman who states that she has a hx of Schizoaffective disorder, and has been unemployed, homeless, and was  from her  of 3 years ( in 2017).  She was most recently at [facility] in 12/10-12/27/19 and at [facility] from 10/11/19 to 11/4/2019 and says that the Zyprexa 20mg is not working.  She presented to [facility] with worsening psychosis and suicidal and homicidal ideation threatening to "kill people" because they are threatening to kill her first.  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  She says that she  a man from [place] in 2017 and converted to Episcopal, but has  from him now and had been homeless until one week ago, but says that his family was concerned and got her an apartment.  She became concerned that her apartment was being searched and people were "playing games" and trying to "kill me."  She denies any current substance use and says she will not take Zyprexa because it does not work.  She says she was raised by her grandparents in [place], but feels that they would not be able to help her now.  She says that her own parents live in [place] but she does not have their contact information.  She denies current suicidal or homicidal thoughts.  She appears to be responding to internal stimuli, cursing in the hallway and appears paranoid. She was able to take her Olanzapine 15mg last night but refused to consider a long acting injectable medication.  Pt will continue Olanzapine 5mg qam and 10mg hs.  zoloft added due to c/o depression  Zoloft DC and Zyprexa lowered to 10mg, begin Ativan bid due to c/o about ongoing anxiety
Pt is a 24 yr old woman who states that she has a hx of Schizoaffective disorder, and has been unemployed, homeless, and was  from her  of 3 years ( in 2017).  She was most recently at [facility] in 12/10-12/27/19 and at [facility] from 10/11/19 to 11/4/2019 and says that the Zyprexa 20mg is not working.  She presented to [facility] with worsening psychosis and suicidal and homicidal ideation threatening to "kill people" because they are threatening to kill her first.  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  She says that she  a man from [place] in 2017 and converted to Pentecostal, but has  from him now and had been homeless until one week ago, but says that his family was concerned and got her an apartment.  She became concerned that her apartment was being searched and people were "playing games" and trying to "kill me."  She denies any current substance use and says she will not take Zyprexa because it does not work.  She says she was raised by her grandparents in [place], but feels that they would not be able to help her now.  She says that her own parents live in [place] but she does not have their contact information.  She denies current suicidal or homicidal thoughts.  She appears to be responding to internal stimuli, cursing in the hallway and appears paranoid. She was able to take her Olanzapine 15mg last night but refused to consider a long acting injectable medication.  Pt will continue Olanzapine 5mg qam and 10mg hs.  zoloft added due to c/o depression  Zoloft DC and Zyprexa lowered to 10mg, begin Ativan bid due to c/o about ongoing anxiety
Pt is a 24 yr old woman who states that she has a hx of Schizoaffective disorder, and has been unemployed, homeless, and was  from her  of 3 years ( in 2017).  She was most recently at [facility] in 12/10-12/27/19 and at [facility] from 10/11/19 to 11/4/2019 and says that the Zyprexa 20mg is not working.  She presented to [facility] with worsening psychosis and suicidal and homicidal ideation threatening to "kill people" because they are threatening to kill her first.  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  She says that she  a man from [place] in 2017 and converted to Restorationism, but has  from him now and had been homeless until one week ago, but says that his family was concerned and got her an apartment.  She became concerned that her apartment was being searched and people were "playing games" and trying to "kill me."  She denies any current substance use and says she will not take Zyprexa because it does not work.  She says she was raised by her grandparents in [place], but feels that they would not be able to help her now.  She says that her own parents live in [place] but she does not have their contact information.  She denies current suicidal or homicidal thoughts.  She appears to be responding to internal stimuli, cursing in the hallway and appears paranoid. She was able to take her Olanzapine 15mg last night but refused to consider a long acting injectable medication.  Pt will continue Olanzapine 5mg qam and 10mg hs.  zoloft added due to c/o depression  Zoloft DC and Zyprexa lowered to 10mg, begin Ativan bid due to c/o about ongoing anxiety
Pt is a 24 yr old woman who states that she has a hx of Schizoaffective disorder, and has been unemployed, homeless, and was  from her  of 3 years ( in 2017).  She was most recently at [facility] in 12/10-12/27/19 and at [facility] from 10/11/19 to 11/4/2019 and says that the Zyprexa 20mg is not working.  She presented to [facility] with worsening psychosis and suicidal and homicidal ideation threatening to "kill people" because they are threatening to kill her first.  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  She says that she  a man from [place] in 2017 and converted to Congregational, but has  from him now and had been homeless until one week ago, but says that his family was concerned and got her an apartment.  She became concerned that her apartment was being searched and people were "playing games" and trying to "kill me."  She denies any current substance use and says she will not take Zyprexa because it does not work.  She says she was raised by her grandparents in [place], but feels that they would not be able to help her now.  She says that her own parents live in [place] but she does not have their contact information.  She denies current suicidal or homicidal thoughts.  She appears to be responding to internal stimuli, cursing in the hallway and appears paranoid. She was able to take her Olanzapine 15mg last night but refused to consider a long acting injectable medication.  Pt will continue Olanzapine 5mg qam and 10mg hs.  zoloft added due to c/o depression  Zoloft DC and Zyprexa lowered to 10mg, begin Ativan bid due to c/o about ongoing anxiety
Pt is a 24 yr old woman who states that she has a hx of Schizoaffective disorder, and has been unemployed, homeless, and was  from her  of 3 years ( in 2017).  She was most recently at [facility] in 12/10-12/27/19 and at [facility] from 10/11/19 to 11/4/2019 and says that the Zyprexa 20mg is not working.  She presented to [facility] with worsening psychosis and suicidal and homicidal ideation threatening to "kill people" because they are threatening to kill her first.  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  She says that she  a man from [place] in 2017 and converted to Christian, but has  from him now and had been homeless until one week ago, but says that his family was concerned and got her an apartment.  She became concerned that her apartment was being searched and people were "playing games" and trying to "kill me."  She denies any current substance use and says she will not take Zyprexa because it does not work.  She says she was raised by her grandparents in [place], but feels that they would not be able to help her now.  She says that her own parents live in [place] but she does not have their contact information.  She denies current suicidal or homicidal thoughts.  She appears to be responding to internal stimuli, cursing in the hallway and appears paranoid. She was able to take her Olanzapine 15mg last night but refused to consider a long acting injectable medication.  Pt will continue Olanzapine 5mg qam and 10mg hs.  zoloft added due to c/o depression  Zoloft DC and Zyprexa lowered to 10mg, begin Ativan bid due to c/o about ongoing anxiety
Pt is a 24 yr old woman who states that she has a hx of Schizoaffective disorder, and has been unemployed, homeless, and was  from her  of 3 years ( in 2017).  She was most recently at [facility] in 12/10-12/27/19 and at [facility] from 10/11/19 to 11/4/2019 and says that the Zyprexa 20mg is not working.  She presented to [facility] with worsening psychosis and suicidal and homicidal ideation threatening to "kill people" because they are threatening to kill her first.  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  She says that she  a man from [place] in 2017 and converted to Bahai, but has  from him now and had been homeless until one week ago, but says that his family was concerned and got her an apartment.  She became concerned that her apartment was being searched and people were "playing games" and trying to "kill me."  She denies any current substance use and says she will not take Zyprexa because it does not work.  She says she was raised by her grandparents in [place], but feels that they would not be able to help her now.  She says that her own parents live in [place] but she does not have their contact information.  She denies current suicidal or homicidal thoughts.  She appears to be responding to internal stimuli, cursing in the hallway and appears paranoid. She was able to take her Olanzapine 15mg last night but refused to consider a long acting injectable medication.  Pt will continue Olanzapine 5mg qam and 10mg hs
Pt is a 24 yr old woman who states that she has a hx of Schizoaffective disorder, and has been unemployed, homeless, and was  from her  of 3 years ( in 2017).  She was most recently at [facility] in 12/10-12/27/19 and at [facility] from 10/11/19 to 11/4/2019 and says that the Zyprexa 20mg is not working.  She presented to [facility] with worsening psychosis and suicidal and homicidal ideation threatening to "kill people" because they are threatening to kill her first.  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  She says that she  a man from [place] in 2017 and converted to Holiness, but has  from him now and had been homeless until one week ago, but says that his family was concerned and got her an apartment.  She became concerned that her apartment was being searched and people were "playing games" and trying to "kill me."  She denies any current substance use and says she will not take Zyprexa because it does not work.  She says she was raised by her grandparents in [place], but feels that they would not be able to help her now.  She says that her own parents live in [place] but she does not have their contact information.  She denies current suicidal or homicidal thoughts.  She appears to be responding to internal stimuli, cursing in the hallway and appears paranoid. She was able to take her Olanzapine 15mg last night but refused to consider a long acting injectable medication.  Pt will continue Olanzapine 5mg qam and 10mg hs.  zoloft added due to c/o depression  Zoloft DC and Zyprexa lowered to 10mg, begin Ativan bid due to c/o about ongoing anxiety
Pt is a 24 yr old woman who states that she has a hx of Schizoaffective disorder, and has been unemployed, homeless, and was  from her  of 3 years ( in 2017).  She was most recently at [facility] in 12/10-12/27/19 and at [facility] from 10/11/19 to 11/4/2019 and says that the Zyprexa 20mg is not working.  She presented to [facility] with worsening psychosis and suicidal and homicidal ideation threatening to "kill people" because they are threatening to kill her first.  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  She says that she  a man from [place] in 2017 and converted to Spiritism, but has  from him now and had been homeless until one week ago, but says that his family was concerned and got her an apartment.  She became concerned that her apartment was being searched and people were "playing games" and trying to "kill me."  She denies any current substance use and says she will not take Zyprexa because it does not work.  She says she was raised by her grandparents in [place], but feels that they would not be able to help her now.  She says that her own parents live in [place] but she does not have their contact information.  She denies current suicidal or homicidal thoughts.  She appears to be responding to internal stimuli, cursing in the hallway and appears paranoid. She was able to take her Olanzapine 15mg last night but refused to consider a long acting injectable medication.  Pt will continue Olanzapine 5mg qam and 10mg hs.  zoloft added due to c/o depression  Zoloft DC and Zyprexa lowered to 10mg, begin Ativan bid due to c/o about ongoing anxiety
Pt is a 24 yr old woman who states that she has a hx of Schizoaffective disorder, and has been unemployed, homeless, and was  from her  of 3 years ( in 2017).  She was most recently at [facility] in 12/10-12/27/19 and at [facility] from 10/11/19 to 11/4/2019 and says that the Zyprexa 20mg is not working.  She presented to [facility] with worsening psychosis and suicidal and homicidal ideation threatening to "kill people" because they are threatening to kill her first.  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  She says that she  a man from [place] in 2017 and converted to Taoist, but has  from him now and had been homeless until one week ago, but says that his family was concerned and got her an apartment.  She became concerned that her apartment was being searched and people were "playing games" and trying to "kill me."  She denies any current substance use and says she will not take Zyprexa because it does not work.  She says she was raised by her grandparents in [place], but feels that they would not be able to help her now.  She says that her own parents live in [place] but she does not have their contact information.  She denies current suicidal or homicidal thoughts.  She appears to be responding to internal stimuli, cursing in the hallway and appears paranoid. She was able to take her Olanzapine 15mg last night but refused to consider a long acting injectable medication.  Pt will continue Olanzapine 5mg qam and 10mg hs.  zoloft added due to c/o depression  Zoloft DC and Zyprexa lowered to 10mg, begin Ativan bid due to c/o about ongoing anxiety
Pt is a 24 yr old woman who states that she has a hx of Schizoaffective disorder, and has been unemployed, homeless, and was  from her  of 3 years ( in 2017).  She was most recently at [facility] in 12/10-12/27/19 and at [facility] from 10/11/19 to 11/4/2019 and says that the Zyprexa 20mg is not working.  She presented to [facility] with worsening psychosis and suicidal and homicidal ideation threatening to "kill people" because they are threatening to kill her first.  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  She says that she  a man from [place] in 2017 and converted to Pentecostal, but has  from him now and had been homeless until one week ago, but says that his family was concerned and got her an apartment.  She became concerned that her apartment was being searched and people were "playing games" and trying to "kill me."  She denies any current substance use and says she will not take Zyprexa because it does not work.  She says she was raised by her grandparents in [place], but feels that they would not be able to help her now.  She says that her own parents live in [place] but she does not have their contact information.  She denies current suicidal or homicidal thoughts.  She appears to be responding to internal stimuli, cursing in the hallway and appears paranoid. She was able to take her Olanzapine 15mg last night but refused to consider a long acting injectable medication.  Pt will continue Olanzapine 5mg qam and 10mg hs.  zoloft added due to c/o depression  Zoloft DC and Zyprexa lowered to 10mg, begin Ativan bid due to c/o about ongoing anxiety
Pt is a 24 yr old woman who states that she has a hx of Schizoaffective disorder, and has been unemployed, homeless, and was  from her  of 3 years ( in 2017).  She was most recently at [facility] in 12/10-12/27/19 and at [facility] from 10/11/19 to 11/4/2019 and says that the Zyprexa 20mg is not working.  She presented to [facility] with worsening psychosis and suicidal and homicidal ideation threatening to "kill people" because they are threatening to kill her first.  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  She says that she  a man from [place] in 2017 and converted to Orthodox, but has  from him now and had been homeless until one week ago, but says that his family was concerned and got her an apartment.  She became concerned that her apartment was being searched and people were "playing games" and trying to "kill me."  She denies any current substance use and says she will not take Zyprexa because it does not work.  She says she was raised by her grandparents in [place], but feels that they would not be able to help her now.  She says that her own parents live in [place] but she does not have their contact information.  She denies current suicidal or homicidal thoughts.  She appears to be responding to internal stimuli, cursing in the hallway and appears paranoid. She was able to take her Olanzapine 15mg last night but refused to consider a long acting injectable medication.  Pt will continue Olanzapine 5mg qam and 10mg hs.  zoloft added due to c/o depression  Zoloft DC and Zyprexa lowered to 10mg
Pt is a 24 yr old woman who states that she has a hx of Schizoaffective disorder, and has been unemployed, homeless, and was  from her  of 3 years ( in 2017).  She was most recently at [facility] in 12/10-12/27/19 and at [facility] from 10/11/19 to 11/4/2019 and says that the Zyprexa 20mg is not working.  She presented to [facility] with worsening psychosis and suicidal and homicidal ideation threatening to "kill people" because they are threatening to kill her first.  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  She says that she  a man from [place] in 2017 and converted to Ep[employer], but has  from him now and had been homeless until one week ago, but says that his family was concerned and got her an apartment.  She became concerned that her apartment was being searched and people were "playing games" and trying to "kill me."  She denies any current substance use and says she will not take Zyprexa because it does not work.  She says she was raised by her grandparents in [place], but feels that they would not be able to help her now.  She says that her own parents live in [place] but she does not have their contact information.  She denies current suicidal or homicidal thoughts.  She appears to be responding to internal stimuli, cursing in the hallway and appears paranoid. She was able to take her Olanzapine 15mg last night but refused to consider a long acting injectable medication.  Pt will continue Olanzapine 5mg qam and 10mg hs.  zoloft added due to c/o depression
Pt is a 24 yr old woman who states that she has a hx of Schizoaffective disorder, and has been unemployed, homeless, and was  from her  of 3 years ( in 2017).  She was most recently at [facility] in 12/10-12/27/19 and at [facility] from 10/11/19 to 11/4/2019 and says that the Zyprexa 20mg is not working.  She presented to [facility]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

## 2020-03-27 NOTE — PROGRESS NOTE BEHAVIORAL HEALTH - RELATEDNESS
Fair
Good

## 2020-03-27 NOTE — PROGRESS NOTE BEHAVIORAL HEALTH - INSIGHT (REGARDING PSYCHIATRIC ILLNESS)
Poor
Other

## 2020-03-27 NOTE — PROGRESS NOTE BEHAVIORAL HEALTH - NSBHADMITIPBHPROVIDER_PSY_A_CORE
N/A/pt says she has not recently seen an outpt provider
pt says she has not recently seen an outpt provider/N/A
pt says she has not recently seen an outpt provider/N/A
N/A/pt says she has not recently seen an outpt provider
pt says she has not recently seen an outpt provider/N/A
N/A/pt says she has not recently seen an outpt provider
pt says she has not recently seen an outpt provider/N/A
N/A/pt says she has not recently seen an outpt provider
pt says she has not recently seen an outpt provider/N/A
N/A/pt says she has not recently seen an outpt provider

## 2020-03-27 NOTE — PROGRESS NOTE BEHAVIORAL HEALTH - NSBHADMITIPOBS_PSY_A_CORE
Routine observation

## 2020-03-27 NOTE — PROGRESS NOTE BEHAVIORAL HEALTH - NSBHFUPMEDSE_PSY_A_CORE
None known

## 2020-03-27 NOTE — PROGRESS NOTE BEHAVIORAL HEALTH - THOUGHT CONTENT
Delusions
Delusions/Other/Unremarkable
Delusions/Other/Unremarkable
Unremarkable
Unremarkable
Unremarkable/Other/Delusions
Unremarkable

## 2020-03-27 NOTE — PROGRESS NOTE BEHAVIORAL HEALTH - NSBHATTESTSEENBY_PSY_A_CORE
attending Psychiatrist without NP/Trainee
NP without Attending Psychiatrist

## 2020-03-27 NOTE — PROGRESS NOTE BEHAVIORAL HEALTH - NS ED BHA REVIEW OF ED CHART AVAILABLE IMAGING REVIEWED
None available

## 2020-03-27 NOTE — PROGRESS NOTE BEHAVIORAL HEALTH - NSBHADMITIPOBSFT_PSY_A_CORE
pt is able to contract for safely.
pt is able to contract for safely.  In behavioral control   Denies any SI or HI

## 2020-03-27 NOTE — PROGRESS NOTE BEHAVIORAL HEALTH - AFFECT QUALITY
Anxious
Euthymic
Anxious
Euthymic
Euthymic/Other
Euthymic/Other
Depressed/Other
Euthymic
Euthymic

## 2020-03-27 NOTE — PROGRESS NOTE BEHAVIORAL HEALTH - BODY HABITUS
Obese
Obese/Well nourished
Well nourished/Obese
Well nourished/Obese
Obese/Well nourished
Well nourished/Obese
Obese/Well nourished
Well nourished/Obese
Well nourished/Obese

## 2020-03-27 NOTE — PROGRESS NOTE BEHAVIORAL HEALTH - THOUGHT ASSOCIATIONS
Loose
Normal

## 2020-03-27 NOTE — PROGRESS NOTE BEHAVIORAL HEALTH - THOUGHT PROCESS
Illogical/Flight of ideas/Tangential
Tangential/Flight of ideas/Illogical
Linear/Other
Linear/Other
Other/Linear
Other/Linear
Other/Normal reasoning/Linear
Tangential/Flight of ideas/Illogical
Linear/Normal reasoning/Other
Linear/Normal reasoning/Other
Linear/Other
Linear/Other
Normal reasoning/Linear/Other
Other/Linear
Other/Linear/Normal reasoning
Linear/Other
Other/Linear
Linear/Other
Other/Linear

## 2020-03-27 NOTE — PROGRESS NOTE BEHAVIORAL HEALTH - ATTENTION / CONCENTRATION
Impaired
Normal

## 2020-03-27 NOTE — PROGRESS NOTE BEHAVIORAL HEALTH - NSBHLOC_PSY_A_CORE
Alert

## 2020-03-27 NOTE — PROGRESS NOTE BEHAVIORAL HEALTH - AFFECT RANGE
Full
Constricted
Constricted/Other
Full
Full/Other
Other/Full
Constricted
Constricted/Other
Other/Constricted
Other/Full
Constricted

## 2020-03-27 NOTE — PROGRESS NOTE BEHAVIORAL HEALTH - NSBHADMITDANGERSELF_PSY_A_CORE
unable to care for self

## 2020-03-27 NOTE — PROGRESS NOTE BEHAVIORAL HEALTH - NSBHADMITNEWMED_PSY_A_CORE
yes
no
yes
no
yes

## 2020-03-27 NOTE — PROGRESS NOTE BEHAVIORAL HEALTH - NSBHADMITMEDEDUDETAILS_A_CORE FT
pt was educated about Risperidone and Invega Sustenna
pt was educated about Risperidone and Invega Sustenna
Need to be compliant with medications discussed.
Psychoeducation again provided re: need for Rx and aftercare adherence for sx management and relapse prevention with teach back verbalized
Psychoeducation provided re: need for Rx and aftercare adherence for sx management and relapse prevention with teach back verbalized
Psychoeducation again  provided re: need for Rx and aftercare adherence for sx management and relapse prevention with teach back verbalized
Psychoeducation provided re: need for Rx and aftercare adherence for sx management and relapse prevention with teach back verbalized
Psychoeducation provided re: need for Rx and aftercare adherence for sx management and relapse prevention with teach back verbalized
Psychoeducation  again provided re: need for Rx and aftercare adherence for sx management and relapse prevention  with teach back verbalized
Psychoeducation  again provided re: need for Rx and aftercare adherence for sx management with teach back verbalized
Psychoeducation  provided re: need for Rx and aftercare adherence for sx management with teach back verbalized
Psychoeducation again  provided re: need for Rx and aftercare adherence for sx management and relapse prevention with teach back verbalized
Psychoeducation provided re: potential benefits/SE of Atarax and that it can help her anxiety without being addictive with patent saying ok
Psychoeducation  again provided re: need for Rx and aftercare adherence for sx management and relapse prevention  with teach back verbalized
Psychoeducation  provided re: potential benefits/SE of Trazadone, with patient saying ok

## 2020-04-01 PROCEDURE — G9005: CPT

## 2020-04-02 DIAGNOSIS — Z71.89 OTHER SPECIFIED COUNSELING: ICD-10-CM

## 2020-08-03 ENCOUNTER — EMERGENCY (EMERGENCY)
Facility: HOSPITAL | Age: 25
LOS: 1 days | End: 2020-08-03
Admitting: EMERGENCY MEDICINE
Payer: MEDICAID

## 2020-08-03 PROCEDURE — 99285 EMERGENCY DEPT VISIT HI MDM: CPT

## 2020-08-20 ENCOUNTER — EMERGENCY (EMERGENCY)
Facility: HOSPITAL | Age: 25
LOS: 1 days | End: 2020-08-20
Admitting: EMERGENCY MEDICINE
Payer: MEDICAID

## 2020-08-20 PROCEDURE — 93010 ELECTROCARDIOGRAM REPORT: CPT

## 2020-08-20 PROCEDURE — 99285 EMERGENCY DEPT VISIT HI MDM: CPT

## 2020-08-21 PROCEDURE — 90792 PSYCH DIAG EVAL W/MED SRVCS: CPT | Mod: 95

## 2020-09-17 ENCOUNTER — EMERGENCY (EMERGENCY)
Facility: HOSPITAL | Age: 25
LOS: 1 days | End: 2020-09-17
Admitting: EMERGENCY MEDICINE
Payer: MEDICAID

## 2020-09-17 PROCEDURE — 99285 EMERGENCY DEPT VISIT HI MDM: CPT

## 2020-09-17 PROCEDURE — 93010 ELECTROCARDIOGRAM REPORT: CPT

## 2020-09-17 PROCEDURE — 71045 X-RAY EXAM CHEST 1 VIEW: CPT | Mod: 26

## 2020-12-18 ENCOUNTER — OUTPATIENT (OUTPATIENT)
Dept: OUTPATIENT SERVICES | Facility: HOSPITAL | Age: 25
LOS: 1 days | End: 2020-12-18

## 2021-01-01 ENCOUNTER — EMERGENCY (EMERGENCY)
Facility: HOSPITAL | Age: 26
LOS: 1 days | End: 2021-01-01
Admitting: EMERGENCY MEDICINE
Payer: MEDICAID

## 2021-01-01 PROCEDURE — 99284 EMERGENCY DEPT VISIT MOD MDM: CPT

## 2021-01-24 NOTE — BEHAVIORAL HEALTH ASSESSMENT NOTE - NS ED BHA MED ROS ENT MOUTH
CARDIZEM IVP 10 GIVEN X1 PER MD ORDER AFIB HR 130S, GIVEN 2ND DOSE 10 MINS LATER HR STILL 
130. CARDIZEM DRIP STARTED PER MD ORDER @ 5MG/HR. WILL CONTINUE TO OBSERVE. No complaints

## 2021-03-18 NOTE — PATIENT PROFILE BEHAVIORAL HEALTH - NS PRO TALK SOMEONE YN
Stefani Palafox is scribing on behalf of Heather Moscoso RN who administered flu shot on 09/16/2020 to patient at the employer, [employer].  Vaccine Information Statement(s) for Influenza given and reviewed, questions answered,  Consent was given and signed by patient for injection(s) administered and is filed in chart.  Patient tolerated without incident. See immunization grid for documentation.    
no
no

## 2021-04-29 ENCOUNTER — EMERGENCY (EMERGENCY)
Facility: HOSPITAL | Age: 26
LOS: 1 days | End: 2021-04-29
Admitting: EMERGENCY MEDICINE
Payer: MEDICAID

## 2021-04-29 PROCEDURE — 74176 CT ABD & PELVIS W/O CONTRAST: CPT | Mod: 26

## 2021-04-29 PROCEDURE — 70450 CT HEAD/BRAIN W/O DYE: CPT | Mod: 26

## 2021-04-29 PROCEDURE — 71250 CT THORAX DX C-: CPT | Mod: 26

## 2021-04-29 PROCEDURE — 93010 ELECTROCARDIOGRAM REPORT: CPT

## 2021-04-29 PROCEDURE — 72125 CT NECK SPINE W/O DYE: CPT | Mod: 26

## 2021-04-29 PROCEDURE — 70486 CT MAXILLOFACIAL W/O DYE: CPT | Mod: 26

## 2021-04-29 PROCEDURE — 99285 EMERGENCY DEPT VISIT HI MDM: CPT

## 2021-04-30 ENCOUNTER — INPATIENT (INPATIENT)
Facility: HOSPITAL | Age: 26
LOS: 23 days | Discharge: HOME | End: 2021-05-24
Attending: PSYCHIATRY & NEUROLOGY | Admitting: PSYCHIATRY & NEUROLOGY
Payer: MEDICAID

## 2021-04-30 VITALS
OXYGEN SATURATION: 98 % | RESPIRATION RATE: 18 BRPM | SYSTOLIC BLOOD PRESSURE: 120 MMHG | HEIGHT: 64 IN | TEMPERATURE: 98 F | HEART RATE: 82 BPM | DIASTOLIC BLOOD PRESSURE: 87 MMHG

## 2021-04-30 DIAGNOSIS — F25.0 SCHIZOAFFECTIVE DISORDER, BIPOLAR TYPE: ICD-10-CM

## 2021-04-30 LAB
ALBUMIN SERPL ELPH-MCNC: 3.5 G/DL — SIGNIFICANT CHANGE UP (ref 3.3–5)
ALP SERPL-CCNC: 80 U/L — SIGNIFICANT CHANGE UP (ref 40–120)
ALT FLD-CCNC: 18 U/L — SIGNIFICANT CHANGE UP (ref 4–33)
ANION GAP SERPL CALC-SCNC: 7 MMOL/L — SIGNIFICANT CHANGE UP (ref 7–14)
AST SERPL-CCNC: 11 U/L — SIGNIFICANT CHANGE UP (ref 4–32)
BASOPHILS # BLD AUTO: 0.03 K/UL — SIGNIFICANT CHANGE UP (ref 0–0.2)
BASOPHILS NFR BLD AUTO: 0.8 % — SIGNIFICANT CHANGE UP (ref 0–2)
BILIRUB SERPL-MCNC: 0.3 MG/DL — SIGNIFICANT CHANGE UP (ref 0.2–1.2)
BUN SERPL-MCNC: 13 MG/DL — SIGNIFICANT CHANGE UP (ref 7–23)
CALCIUM SERPL-MCNC: 9.1 MG/DL — SIGNIFICANT CHANGE UP (ref 8.4–10.5)
CHLORIDE SERPL-SCNC: 107 MMOL/L — SIGNIFICANT CHANGE UP (ref 98–107)
CO2 SERPL-SCNC: 25 MMOL/L — SIGNIFICANT CHANGE UP (ref 22–31)
COVID-19 NUCLEOCAPSID GAM AB INTERP: POSITIVE
COVID-19 NUCLEOCAPSID TOTAL GAM ANTIBODY RESULT: 33.1 INDEX — HIGH
CREAT SERPL-MCNC: 0.87 MG/DL — SIGNIFICANT CHANGE UP (ref 0.5–1.3)
EOSINOPHIL # BLD AUTO: 0 K/UL — SIGNIFICANT CHANGE UP (ref 0–0.5)
EOSINOPHIL NFR BLD AUTO: 0 % — SIGNIFICANT CHANGE UP (ref 0–6)
GLUCOSE SERPL-MCNC: 97 MG/DL — SIGNIFICANT CHANGE UP (ref 70–99)
HCT VFR BLD CALC: 41.8 % — SIGNIFICANT CHANGE UP (ref 34.5–45)
HGB BLD-MCNC: 13.6 G/DL — SIGNIFICANT CHANGE UP (ref 11.5–15.5)
IANC: 1.45 K/UL — LOW (ref 1.5–8.5)
IMM GRANULOCYTES NFR BLD AUTO: 0 % — SIGNIFICANT CHANGE UP (ref 0–1.5)
LYMPHOCYTES # BLD AUTO: 1.92 K/UL — SIGNIFICANT CHANGE UP (ref 1–3.3)
LYMPHOCYTES # BLD AUTO: 52.3 % — HIGH (ref 13–44)
MCHC RBC-ENTMCNC: 29.5 PG — SIGNIFICANT CHANGE UP (ref 27–34)
MCHC RBC-ENTMCNC: 32.5 GM/DL — SIGNIFICANT CHANGE UP (ref 32–36)
MCV RBC AUTO: 90.7 FL — SIGNIFICANT CHANGE UP (ref 80–100)
MONOCYTES # BLD AUTO: 0.27 K/UL — SIGNIFICANT CHANGE UP (ref 0–0.9)
MONOCYTES NFR BLD AUTO: 7.4 % — SIGNIFICANT CHANGE UP (ref 2–14)
NEUTROPHILS # BLD AUTO: 1.45 K/UL — LOW (ref 1.8–7.4)
NEUTROPHILS NFR BLD AUTO: 39.5 % — LOW (ref 43–77)
NRBC # BLD: 0 /100 WBCS — SIGNIFICANT CHANGE UP
NRBC # FLD: 0 K/UL — SIGNIFICANT CHANGE UP
PLATELET # BLD AUTO: 225 K/UL — SIGNIFICANT CHANGE UP (ref 150–400)
POTASSIUM SERPL-MCNC: 3.8 MMOL/L — SIGNIFICANT CHANGE UP (ref 3.5–5.3)
POTASSIUM SERPL-SCNC: 3.8 MMOL/L — SIGNIFICANT CHANGE UP (ref 3.5–5.3)
PROT SERPL-MCNC: 6.3 G/DL — SIGNIFICANT CHANGE UP (ref 6–8.3)
RBC # BLD: 4.61 M/UL — SIGNIFICANT CHANGE UP (ref 3.8–5.2)
RBC # FLD: 12.2 % — SIGNIFICANT CHANGE UP (ref 10.3–14.5)
SARS-COV-2 IGG+IGM SERPL QL IA: 33.1 INDEX — HIGH
SARS-COV-2 IGG+IGM SERPL QL IA: POSITIVE
SARS-COV-2 RNA SPEC QL NAA+PROBE: SIGNIFICANT CHANGE UP
SODIUM SERPL-SCNC: 139 MMOL/L — SIGNIFICANT CHANGE UP (ref 135–145)
TOXICOLOGY SCREEN, DRUGS OF ABUSE, SERUM RESULT: SIGNIFICANT CHANGE UP
TSH SERPL-MCNC: 4.19 UIU/ML — SIGNIFICANT CHANGE UP (ref 0.27–4.2)
WBC # BLD: 3.67 K/UL — LOW (ref 3.8–10.5)
WBC # FLD AUTO: 3.67 K/UL — LOW (ref 3.8–10.5)

## 2021-04-30 PROCEDURE — 99285 EMERGENCY DEPT VISIT HI MDM: CPT | Mod: 25

## 2021-04-30 PROCEDURE — 93010 ELECTROCARDIOGRAM REPORT: CPT

## 2021-04-30 PROCEDURE — 99222 1ST HOSP IP/OBS MODERATE 55: CPT

## 2021-04-30 RX ORDER — ACETAMINOPHEN 500 MG
650 TABLET ORAL ONCE
Refills: 0 | Status: COMPLETED | OUTPATIENT
Start: 2021-04-30 | End: 2021-04-30

## 2021-04-30 RX ORDER — OLANZAPINE 15 MG/1
10 TABLET, FILM COATED ORAL AT BEDTIME
Refills: 0 | Status: DISCONTINUED | OUTPATIENT
Start: 2021-04-30 | End: 2021-05-07

## 2021-04-30 RX ORDER — THIAMINE MONONITRATE (VIT B1) 100 MG
100 TABLET ORAL DAILY
Refills: 0 | Status: DISCONTINUED | OUTPATIENT
Start: 2021-04-30 | End: 2021-05-18

## 2021-04-30 RX ORDER — HALOPERIDOL DECANOATE 100 MG/ML
5 INJECTION INTRAMUSCULAR ONCE
Refills: 0 | Status: DISCONTINUED | OUTPATIENT
Start: 2021-04-30 | End: 2021-05-24

## 2021-04-30 RX ORDER — HALOPERIDOL DECANOATE 100 MG/ML
5 INJECTION INTRAMUSCULAR EVERY 6 HOURS
Refills: 0 | Status: DISCONTINUED | OUTPATIENT
Start: 2021-04-30 | End: 2021-05-24

## 2021-04-30 RX ADMIN — Medication 2 MILLIGRAM(S): at 21:00

## 2021-04-30 RX ADMIN — OLANZAPINE 10 MILLIGRAM(S): 15 TABLET, FILM COATED ORAL at 21:03

## 2021-04-30 RX ADMIN — Medication 875 MILLIGRAM(S): at 21:02

## 2021-04-30 RX ADMIN — Medication 2 MILLIGRAM(S): at 13:23

## 2021-04-30 RX ADMIN — Medication 1 TABLET(S): at 07:59
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
## 2021-04-30 NOTE — BH INPATIENT PSYCHIATRY ASSESSMENT NOTE - NSBHCHARTREVIEWVS_PSY_A_CORE FT
Vital Signs Last 24 Hrs  T(C): 37.1 (30 Apr 2021 15:01), Max: 37.1 (30 Apr 2021 15:01)  T(F): 98.8 (30 Apr 2021 15:01), Max: 98.8 (30 Apr 2021 15:01)  HR: 88 (30 Apr 2021 10:18) (82 - 88)  BP: 141/101 (30 Apr 2021 10:18) (120/87 - 141/101)  BP(mean): --  RR: 18 (30 Apr 2021 10:18) (18 - 18)  SpO2: 98% (30 Apr 2021 10:18) (98% - 98%)

## 2021-04-30 NOTE — CONSULT NOTE ADULT - ATTENDING COMMENTS
I have reviewed images and agree with resident findings. patient should be seen within 1 week of discharge.

## 2021-04-30 NOTE — ED BEHAVIORAL HEALTH ASSESSMENT NOTE - MEDICAL ISSUES AND PLAN (INCLUDE STANDING AND PRN MEDICATION)
Per Ophthalmology recommendations for left orbital fracture Per Ophthalmology recommendations for left orbital fracture; Augmentin 875 mg po BID x 7 days (pt received Augmentin 875 mg po x 1 in ED on 4/30/21) per Dr. Ramon Dexter
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
## 2021-04-30 NOTE — BH PATIENT PROFILE - HOME MEDICATIONS
LORazepam 1 mg oral tablet , 1 tab(s) orally 2 times a day x 30 days  at 9am and 6pm for anxiety MDD:2mg  OLANZapine 10 mg oral tablet , 1 tab(s) orally once a day x 30 days  at 6pm for psychosis

## 2021-04-30 NOTE — ED ADULT NURSE REASSESSMENT NOTE - NS ED NURSE REASSESS COMMENT FT1
Received report from RN CW, pt calm & cooperative denies si/hi/avh presently, pt awaiting bed assignment safety & comfort measures maintained eval on going.

## 2021-04-30 NOTE — ED PROVIDER NOTE - PROGRESS NOTE DETAILS
Ramona DO PGY-1: called [facility] to fax over the CT results since they are not showing up in our system. O'Viviana DO PGY-1: got fax from [employer]. paged optho and omfs Tong: john and omfs consulted. Stefany Breaux M.D. Resident  Orbital floor fracture, seen by OMFS and Ophtho. Recommend sinus precautions and augmentin. BH consulted for SI (attempting to swallow glass per [place]), will see patient. Ramon Dexter MD: although patient reported that she swallowed glass in a suicide attempt, the CTAP documentation from [facility] 4/29/21 notably demonstrates the lack of foreign body or any other acute process in the esophagus/stomach/etc. Paper documentation of this CTAP was placed in patient's ED chart

## 2021-04-30 NOTE — BH PATIENT PROFILE - NSSBIRTOFTENALCOHOL_GEN_A_CORE
pt denies any substance use but per [facility] report collateral from [facility] pt has hx alcohol use.  Per collateral nursing report, pt came to the unit with an empty pint bottle of liquor.

## 2021-04-30 NOTE — ED PROVIDER NOTE - PHYSICAL EXAMINATION
CONSTITUTIONAL: Well-developed; well-nourished; in no acute distress.   SKIN: warm, dry  HEAD: Normocephalic; atraumatic.  EYES: no conjunctival injection. PERRL. EOMI. swelling and ecchymosis around L eye   ENT: No nasal discharge; airway clear.  NECK: Supple; non tender.  CARD: S1, S2 normal; no murmurs, gallops, or rubs. Regular rate and rhythm.   RESP: No wheezes, rales or rhonchi. Good air movement bilaterally.   ABD: soft ntnd, no guarding, no distention, no rigidity.   EXT: Ambulates independently.  No cyanosis or edema.   NEURO: Alert, oriented, grossly unremarkable  PSYCH: flat affect CONSTITUTIONAL: Well-developed; well-nourished; in no acute distress.   SKIN: warm, dry  HEAD: Normocephalic; atraumatic.  EYES: no conjunctival injection. PERRL. EOMI. swelling and ecchymosis around L eye. L eye 20/20. R eye 20/20.   ENT: No nasal discharge; airway clear.  NECK: Supple; non tender.  CARD: S1, S2 normal; no murmurs, gallops, or rubs. Regular rate and rhythm.   RESP: No wheezes, rales or rhonchi. Good air movement bilaterally.   ABD: soft ntnd, no guarding, no distention, no rigidity.   EXT: Ambulates independently.  No cyanosis or edema.   NEURO: Alert, oriented, grossly unremarkable  PSYCH: flat affect
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
## 2021-04-30 NOTE — ED BEHAVIORAL HEALTH ASSESSMENT NOTE - DESCRIPTION
hostile, very irritable, in tenuous behavioral control  Vital Signs Last 24 Hrs  T(C): 36.6 (30 Apr 2021 07:45), Max: 36.6 (30 Apr 2021 07:45)  T(F): 97.8 (30 Apr 2021 07:45), Max: 97.8 (30 Apr 2021 07:45)  HR: 88 (30 Apr 2021 07:45) (82 - 88)  BP: 125/84 (30 Apr 2021 07:45) (120/87 - 125/84)  BP(mean): --  RR: 18 (30 Apr 2021 07:45) (18 - 18)  SpO2: 98% (30 Apr 2021 07:45) (98% - 98%) see HPI left orbital fracture

## 2021-04-30 NOTE — BH INPATIENT PSYCHIATRY ASSESSMENT NOTE - MSE UNSTRUCTURED FT
The pt is negativistic and uncooperative with interview. She tolerates only short interaction before turning away and refusing to answer further questions. She is guarded and suspicious. She has very poor eye contact, no clear Psychomotor agitatio/retardation, normal rate/volume of speech. Her mood is described as "upset and annoyed". Affect is constricted to dysphoria, labile, appropriate, intense. Pt is suspected to experience internal stimuli but verbalizes denial of this. She is paranoid that doctors did not remove the glass from her because "they believe that [she] lied about swallowing the glass". There is no evidence FTD. Pt admitted to recent suicide attempt and does not answer currently to the question regarding suicidal ideations. She makes no statements on the presence of aggressive or homicidal ideations, intents, or plans. She is mildly sedated but easily arousal and grossly oriented in all spheres. Pt's insight, judgement, impulse control are very poor.

## 2021-04-30 NOTE — BH INPATIENT PSYCHIATRY ASSESSMENT NOTE - CURRENT MEDICATION
MEDICATIONS  (STANDING):  amoxicillin/clavulanate Oral Tab/Cap - Peds 875 milliGRAM(s) Oral every 12 hours  LORazepam     Tablet 2 milliGRAM(s) Oral two times a day  OLANZapine 10 milliGRAM(s) Oral at bedtime    MEDICATIONS  (PRN):  haloperidol     Tablet 5 milliGRAM(s) Oral every 6 hours PRN agitation  haloperidol    Injectable 5 milliGRAM(s) IntraMuscular Once PRN severe agitation  LORazepam     Tablet 2 milliGRAM(s) Oral every 6 hours PRN Agitation  LORazepam   Injectable 2 milliGRAM(s) IntraMuscular Once PRN severe agitation
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
## 2021-04-30 NOTE — ED BEHAVIORAL HEALTH ASSESSMENT NOTE - OTHER
did not assess no known h/o violence [facility] see above  per Feb 2020 documentation
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
## 2021-04-30 NOTE — ED BEHAVIORAL HEALTH ASSESSMENT NOTE - SUMMARY
Pt is a 25 yr old woman who states that she has a hx of Schizoaffective disorder, and has been unemployed, homeless, sometimes stays in motels,  from her  per Feb 2020 documentation, She was most recently at [facility] in 12/10-12/27/19 and at [facility] from 10/11/19 to 11/4/2019, followed by [facility], no h/o suicide attempts or violence, no known legal issues, no PMH, h/o alcohol use, BIBEMS from [facility] for SI, also reporting swallowing glass to try to kill herself, and treatment of left orbital fracture.  On interview, pt is minimally cooperative, very guarded, irritable, and hostile. She maintains that she swallowed glass 3 days ago to try to kill herself. CT abdomen/pelvis performed yesterday (4/29/21) at [facility] does NOT reveal any foreign body or acute process. Pt continues to endorse SI but denies plan or intent at this time. She also reports AH but refuses to elaborate. Pt is impulsive and unpredictable and presents an acute danger to self. She requires inpatient psychiatric admission for safety and stabilization.

## 2021-04-30 NOTE — CONSULT NOTE ADULT - ASSESSMENT
Assessment and Recommendations:  25y female with a past medical history/ocular history of schizophrenia, Bipolar Suicudal consulted for Orbital floor fracture and possible FB in orbit, found to have Orbital Floor fracture.     # Orbital Floor Fracture  - No Clinical or radiological signs of entrapment  - No Enophthalmos, proptosis  - Images reviewed - unlikely FB in orbit given history. Air lucency is likely from Air from sinus cavity entering orbit through fracture. No signs of penetration through eye into orbit.   - OMFS/Plastics to Evaluate patient.   - Recommend Antibiotics treatment as per primary team  - patient should avoid blowing his nose -as this can increase Air in the orbit.   - ice packs to eyelids for 20 minutes every 1-2 hours for first 24-48 hours  - HOB elevation to 30 degrees when at rest  - Patient counseled to report if extraocular movement pain or restriction, or diplopia develop   - Follow up with Clinic in 1-2 weeks as below.     Outpatient Follow-up: Patient should follow-up with his/her ophthalmologist or with [facility] Department of Ophthalmology within 1 week of after discharge at:    [address]  [place] [address]  [phone]    Jesús Spann MD, PGY-2  Pager: [phone]  Also available on [employer] Teams Assessment and Recommendations:  25y female with a past medical history/ocular history of schizophrenia, Bipolar Suicudal consulted for Orbital floor fracture and possible FB in orbit, found to have Orbital Floor fracture.   BCVA: 20/20 PERRL, IOP 12OU, EOMI No diplpia, enophthalmos, proptosis, mild pain with movement.  Left Periorbital/lid edema, ecchymosis. Exam otherwise normal.     # Orbital Floor Fracture  - No Clinical or radiological signs of entrapment  - No Enophthalmos, proptosis  - Images reviewed - unlikely FB in orbit given history. Air lucency is likely from Air from sinus cavity entering orbit through fracture. No signs of penetration through eye into orbit.   - OMFS/Plastics to Evaluate patient.   - Recommend Antibiotics treatment as per primary team  - patient should avoid blowing his nose -as this can increase Air in the orbit.   - ice packs to eyelids for 20 minutes every 1-2 hours for first 24-48 hours  - HOB elevation to 30 degrees when at rest  - Patient counseled to report if extraocular movement pain or restriction, or diplopia develop   - Follow up with Clinic in 1-2 weeks as below.     Outpatient Follow-up: Patient should follow-up with his/her ophthalmologist or with [facility] Department of Ophthalmology within 1 week of after discharge at:    [address]  [place] [address]  [phone]    Jesús Spann MD, PGY-2  Pager: [phone]  Also available on [employer] Teams Assessment and Recommendations:  25y female with a past medical history/ocular history of schizophrenia, Bipolar Suicudal consulted for Orbital floor fracture and possible FB in orbit, found to have Orbital Floor fracture.   BCVA: 20/20 PERRL, IOP 12OU, EOMI No diplpia, enophthalmos, proptosis, mild pain with movement.  Left Periorbital/lid edema, ecchymosis. Exam otherwise normal.     # Orbital Floor Fracture  - No Clinical or radiological signs of entrapment  - No Enophthalmos, proptosis  - Images reviewed - unlikely FB in orbit given history. Air lucency is likely from Air from sinus cavity entering orbit through fracture. No signs of penetration through eye into orbit.   - OMFS/Plastics to Evaluate patient.   - Recommend Antibiotics treatment as per primary team  - patient should avoid blowing his nose -as this can increase air in the orbit.   - ice packs to eyelids for 20 minutes every 1-2 hours for first 24-48 hours.  - HOB elevation to 30 degrees when at rest  - Patient counseled to report if extraocular movement pain or restriction, or diplopia develop   - Follow up with clinic in 1-2 weeks as below.     Outpatient Follow-up: Patient should follow-up with his/her ophthalmologist or with [facility] Department of Ophthalmology within 1 week of after discharge at:    [address]  [place] [address]  [phone]    Jesús Spann MD, PGY-2  Pager: [phone]  Also available on [employer] Teams
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
## 2021-04-30 NOTE — ED BEHAVIORAL HEALTH ASSESSMENT NOTE - VIOLENCE RISK FACTORS:
Substance abuse/Affective dysregulation/Lack of insight into violence risk/need for treatment/Irritability
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
## 2021-04-30 NOTE — ED PROVIDER NOTE - NS ED ROS FT
CONSTITUTIONAL - No fever, No diaphoresis, No weight change  EYES - No eye pain, No blurred vision, +pain around L eye  ENT - No change in hearing, No sore throat, No neck pain, No rhinorrhea, No ear pain  RESPIRATORY - No shortness of breath, No cough  CARDIAC -No chest pain, No palpitations  GI - No abdominal pain, No nausea, No vomiting, No diarrhea, No constipation  - No dysuria, no frequency, no hematuria.   MUSCULOSKELETAL - No joint pain, No swelling, No back pain  NEUROLOGIC - No numbness, No focal weakness, No headache, No dizziness

## 2021-05-01 PROCEDURE — 99223 1ST HOSP IP/OBS HIGH 75: CPT

## 2021-05-01 PROCEDURE — 99232 SBSQ HOSP IP/OBS MODERATE 35: CPT

## 2021-05-01 RX ORDER — KETOROLAC TROMETHAMINE 30 MG/ML
10 SYRINGE (ML) INJECTION ONCE
Refills: 0 | Status: DISCONTINUED | OUTPATIENT
Start: 2021-05-01 | End: 2021-05-01

## 2021-05-01 RX ADMIN — Medication 1 TABLET(S): at 20:42

## 2021-05-01 RX ADMIN — Medication 100 MILLIGRAM(S): at 08:56

## 2021-05-01 RX ADMIN — Medication 2 MILLIGRAM(S): at 18:18

## 2021-05-01 RX ADMIN — Medication 2 MILLIGRAM(S): at 08:57

## 2021-05-01 RX ADMIN — Medication 2 MILLIGRAM(S): at 20:42

## 2021-05-01 RX ADMIN — Medication 875 MILLIGRAM(S): at 08:56

## 2021-05-01 RX ADMIN — OLANZAPINE 10 MILLIGRAM(S): 15 TABLET, FILM COATED ORAL at 20:42
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
## 2021-05-02 PROCEDURE — 99232 SBSQ HOSP IP/OBS MODERATE 35: CPT

## 2021-05-02 RX ADMIN — Medication 1 TABLET(S): at 20:28

## 2021-05-02 RX ADMIN — Medication 2 MILLIGRAM(S): at 15:55

## 2021-05-02 RX ADMIN — Medication 1 TABLET(S): at 10:25

## 2021-05-02 RX ADMIN — Medication 500 MILLIGRAM(S): at 18:09

## 2021-05-02 RX ADMIN — Medication 500 MILLIGRAM(S): at 17:09

## 2021-05-02 RX ADMIN — Medication 2 MILLIGRAM(S): at 10:25

## 2021-05-02 RX ADMIN — Medication 2 MILLIGRAM(S): at 20:28

## 2021-05-02 RX ADMIN — OLANZAPINE 10 MILLIGRAM(S): 15 TABLET, FILM COATED ORAL at 20:28

## 2021-05-02 RX ADMIN — Medication 100 MILLIGRAM(S): at 10:25

## 2021-05-02 NOTE — PSYCHIATRIC REHAB INITIAL EVALUATION - NSBHPRRECOMMEND_PSY_ALL_CORE
Writer attempted to meet with patient in order to orient patient to unit and introduce patient to psychiatric rehabilitation staff and department functions. Patient declined to meet with writer, therefore the following information will be obtained from patient’s medical record. Chart indicates patient was BIB EMS endorsing suicidal ideation and reportedly swallowed glass. While on the unit patient presents as guarded and irritable. Patient is currently maintained on CO 1:1 for suicidality. Writer will orient patient to unit rules, unit programming and daily activities when patient is more receptive. Writer and patient were unable to collaborate on a psychiatric rehabilitation goal, therefore one will be chosen for her. Psychiatric rehabilitation staff will continue to engage patient daily in order to develop therapeutic rapport.

## 2021-05-03 PROCEDURE — 99232 SBSQ HOSP IP/OBS MODERATE 35: CPT

## 2021-05-03 RX ORDER — ACETAMINOPHEN 500 MG
650 TABLET ORAL EVERY 6 HOURS
Refills: 0 | Status: DISCONTINUED | OUTPATIENT
Start: 2021-05-03 | End: 2021-05-24

## 2021-05-03 RX ADMIN — Medication 100 MILLIGRAM(S): at 11:15

## 2021-05-03 RX ADMIN — Medication 1 TABLET(S): at 21:51

## 2021-05-03 RX ADMIN — Medication 1 TABLET(S): at 11:16

## 2021-05-03 RX ADMIN — Medication 2 MILLIGRAM(S): at 17:35

## 2021-05-03 RX ADMIN — Medication 2 MILLIGRAM(S): at 21:51

## 2021-05-03 RX ADMIN — Medication 2 MILLIGRAM(S): at 11:15

## 2021-05-03 RX ADMIN — OLANZAPINE 10 MILLIGRAM(S): 15 TABLET, FILM COATED ORAL at 21:51

## 2021-05-03 NOTE — BH SOCIAL WORK INITIAL PSYCHOSOCIAL EVALUATION - NSBHHOUSECOMMENTFT_PSY_ALL_CORE
Patient was at [facility] in [place], then left for a motel, then [facility] to [facility] [place], and then left for another motel.

## 2021-05-03 NOTE — DIETITIAN INITIAL EVALUATION ADULT. - OTHER INFO
Met Pt in room. Pt reports good appetite/po intake at present. No GI distress noted. Pt declined to provide further nutrition history. Pt requesting Lacto-ovo vegetarian diet.  Food preferences taken and implemented.

## 2021-05-03 NOTE — BH SOCIAL WORK INITIAL PSYCHOSOCIAL EVALUATION - OTHER PAST PSYCHIATRIC HISTORY (INCLUDE DETAILS REGARDING ONSET, COURSE OF ILLNESS, INPATIENT/OUTPATIENT TREATMENT)
Patient with history of Schizoaffective disorder, unemployed, homeless, staying in motels, multiple inpatient psychiatric admissions, most recently at [facility] 12/10-12/27 2019, and followed by ACT team [facility].

## 2021-05-04 PROCEDURE — 99232 SBSQ HOSP IP/OBS MODERATE 35: CPT

## 2021-05-04 RX ADMIN — Medication 1 TABLET(S): at 20:33

## 2021-05-04 RX ADMIN — Medication 2 MILLIGRAM(S): at 20:33

## 2021-05-04 RX ADMIN — OLANZAPINE 10 MILLIGRAM(S): 15 TABLET, FILM COATED ORAL at 20:33

## 2021-05-04 RX ADMIN — Medication 2 MILLIGRAM(S): at 09:28

## 2021-05-04 RX ADMIN — Medication 2 MILLIGRAM(S): at 17:05

## 2021-05-04 RX ADMIN — Medication 1 TABLET(S): at 09:28

## 2021-05-05 PROCEDURE — 99232 SBSQ HOSP IP/OBS MODERATE 35: CPT

## 2021-05-05 RX ADMIN — Medication 1 TABLET(S): at 20:29

## 2021-05-05 RX ADMIN — OLANZAPINE 10 MILLIGRAM(S): 15 TABLET, FILM COATED ORAL at 20:29

## 2021-05-05 RX ADMIN — Medication 1 TABLET(S): at 08:53

## 2021-05-05 RX ADMIN — Medication 100 MILLIGRAM(S): at 08:53

## 2021-05-05 RX ADMIN — Medication 1 MILLIGRAM(S): at 08:53

## 2021-05-05 RX ADMIN — Medication 2 MILLIGRAM(S): at 20:29

## 2021-05-05 RX ADMIN — Medication 2 MILLIGRAM(S): at 15:37

## 2021-05-06 PROCEDURE — 99232 SBSQ HOSP IP/OBS MODERATE 35: CPT

## 2021-05-06 RX ADMIN — Medication 2 MILLIGRAM(S): at 21:35

## 2021-05-06 RX ADMIN — Medication 1 TABLET(S): at 09:01

## 2021-05-06 RX ADMIN — Medication 100 MILLIGRAM(S): at 09:01

## 2021-05-06 RX ADMIN — Medication 1 MILLIGRAM(S): at 09:01

## 2021-05-06 RX ADMIN — OLANZAPINE 10 MILLIGRAM(S): 15 TABLET, FILM COATED ORAL at 21:34

## 2021-05-06 RX ADMIN — Medication 1 TABLET(S): at 21:34

## 2021-05-06 RX ADMIN — Medication 1 MILLIGRAM(S): at 11:37

## 2021-05-07 ENCOUNTER — NON-APPOINTMENT (OUTPATIENT)
Age: 26
End: 2021-05-07

## 2021-05-07 ENCOUNTER — APPOINTMENT (OUTPATIENT)
Dept: OPHTHALMOLOGY | Facility: CLINIC | Age: 26
End: 2021-05-07
Payer: COMMERCIAL

## 2021-05-07 PROBLEM — Z00.00 ENCOUNTER FOR PREVENTIVE HEALTH EXAMINATION: Status: ACTIVE | Noted: 2021-05-07

## 2021-05-07 LAB
A1C WITH ESTIMATED AVERAGE GLUCOSE RESULT: 4.8 % — SIGNIFICANT CHANGE UP (ref 4–5.6)
BASOPHILS # BLD AUTO: 0.04 K/UL — SIGNIFICANT CHANGE UP (ref 0–0.2)
BASOPHILS NFR BLD AUTO: 1.1 % — SIGNIFICANT CHANGE UP (ref 0–2)
CHOLEST SERPL-MCNC: 158 MG/DL — SIGNIFICANT CHANGE UP
EOSINOPHIL # BLD AUTO: 0 K/UL — SIGNIFICANT CHANGE UP (ref 0–0.5)
EOSINOPHIL NFR BLD AUTO: 0 % — SIGNIFICANT CHANGE UP (ref 0–6)
ESTIMATED AVERAGE GLUCOSE: 91 MG/DL — SIGNIFICANT CHANGE UP (ref 68–114)
HCT VFR BLD CALC: 46.2 % — HIGH (ref 34.5–45)
HDLC SERPL-MCNC: 49 MG/DL — LOW
HGB BLD-MCNC: 14.7 G/DL — SIGNIFICANT CHANGE UP (ref 11.5–15.5)
IANC: 1.66 K/UL — SIGNIFICANT CHANGE UP (ref 1.5–8.5)
IMM GRANULOCYTES NFR BLD AUTO: 0 % — SIGNIFICANT CHANGE UP (ref 0–1.5)
LIPID PNL WITH DIRECT LDL SERPL: 89 MG/DL — SIGNIFICANT CHANGE UP
LYMPHOCYTES # BLD AUTO: 1.67 K/UL — SIGNIFICANT CHANGE UP (ref 1–3.3)
LYMPHOCYTES # BLD AUTO: 46.6 % — HIGH (ref 13–44)
MCHC RBC-ENTMCNC: 29.3 PG — SIGNIFICANT CHANGE UP (ref 27–34)
MCHC RBC-ENTMCNC: 31.8 GM/DL — LOW (ref 32–36)
MCV RBC AUTO: 92 FL — SIGNIFICANT CHANGE UP (ref 80–100)
MONOCYTES # BLD AUTO: 0.21 K/UL — SIGNIFICANT CHANGE UP (ref 0–0.9)
MONOCYTES NFR BLD AUTO: 5.9 % — SIGNIFICANT CHANGE UP (ref 2–14)
NEUTROPHILS # BLD AUTO: 1.66 K/UL — LOW (ref 1.8–7.4)
NEUTROPHILS NFR BLD AUTO: 46.4 % — SIGNIFICANT CHANGE UP (ref 43–77)
NON HDL CHOLESTEROL: 109 MG/DL — SIGNIFICANT CHANGE UP
NRBC # BLD: 0 /100 WBCS — SIGNIFICANT CHANGE UP
NRBC # FLD: 0 K/UL — SIGNIFICANT CHANGE UP
PLATELET # BLD AUTO: 228 K/UL — SIGNIFICANT CHANGE UP (ref 150–400)
RBC # BLD: 5.02 M/UL — SIGNIFICANT CHANGE UP (ref 3.8–5.2)
RBC # FLD: 11.9 % — SIGNIFICANT CHANGE UP (ref 10.3–14.5)
TRIGL SERPL-MCNC: 100 MG/DL — SIGNIFICANT CHANGE UP
WBC # BLD: 3.58 K/UL — LOW (ref 3.8–10.5)
WBC # FLD AUTO: 3.58 K/UL — LOW (ref 3.8–10.5)

## 2021-05-07 PROCEDURE — 99232 SBSQ HOSP IP/OBS MODERATE 35: CPT

## 2021-05-07 PROCEDURE — 92002 INTRM OPH EXAM NEW PATIENT: CPT

## 2021-05-07 PROCEDURE — 99072 ADDL SUPL MATRL&STAF TM PHE: CPT

## 2021-05-07 RX ORDER — OLANZAPINE 15 MG/1
15 TABLET, FILM COATED ORAL AT BEDTIME
Refills: 0 | Status: DISCONTINUED | OUTPATIENT
Start: 2021-05-07 | End: 2021-05-11

## 2021-05-07 RX ADMIN — Medication 2 MILLIGRAM(S): at 20:40

## 2021-05-07 RX ADMIN — OLANZAPINE 15 MILLIGRAM(S): 15 TABLET, FILM COATED ORAL at 20:40

## 2021-05-07 RX ADMIN — Medication 1 MILLIGRAM(S): at 08:21

## 2021-05-07 RX ADMIN — Medication 1 MILLIGRAM(S): at 18:53

## 2021-05-07 RX ADMIN — Medication 100 MILLIGRAM(S): at 08:21

## 2021-05-07 RX ADMIN — Medication 1 TABLET(S): at 08:21

## 2021-05-08 RX ADMIN — Medication 1 MILLIGRAM(S): at 09:19

## 2021-05-08 RX ADMIN — Medication 2 MILLIGRAM(S): at 20:22

## 2021-05-08 RX ADMIN — Medication 100 MILLIGRAM(S): at 09:19

## 2021-05-08 RX ADMIN — OLANZAPINE 15 MILLIGRAM(S): 15 TABLET, FILM COATED ORAL at 20:22

## 2021-05-08 RX ADMIN — Medication 1 MILLIGRAM(S): at 15:20

## 2021-05-09 RX ADMIN — Medication 1 MILLIGRAM(S): at 09:43

## 2021-05-09 RX ADMIN — Medication 1 MILLIGRAM(S): at 16:37

## 2021-05-09 RX ADMIN — OLANZAPINE 15 MILLIGRAM(S): 15 TABLET, FILM COATED ORAL at 20:09

## 2021-05-09 RX ADMIN — Medication 2 MILLIGRAM(S): at 20:09

## 2021-05-09 RX ADMIN — Medication 100 MILLIGRAM(S): at 09:43

## 2021-05-10 PROCEDURE — 99231 SBSQ HOSP IP/OBS SF/LOW 25: CPT

## 2021-05-10 RX ADMIN — OLANZAPINE 15 MILLIGRAM(S): 15 TABLET, FILM COATED ORAL at 21:01

## 2021-05-10 RX ADMIN — Medication 1 MILLIGRAM(S): at 16:59

## 2021-05-10 RX ADMIN — Medication 1 MILLIGRAM(S): at 08:39

## 2021-05-10 RX ADMIN — Medication 100 MILLIGRAM(S): at 08:39

## 2021-05-10 RX ADMIN — Medication 2 MILLIGRAM(S): at 21:01

## 2021-05-11 PROCEDURE — 99231 SBSQ HOSP IP/OBS SF/LOW 25: CPT

## 2021-05-11 RX ORDER — OLANZAPINE 15 MG/1
10 TABLET, FILM COATED ORAL AT BEDTIME
Refills: 0 | Status: DISCONTINUED | OUTPATIENT
Start: 2021-05-11 | End: 2021-05-24

## 2021-05-11 RX ADMIN — Medication 1 MILLIGRAM(S): at 15:06

## 2021-05-11 RX ADMIN — Medication 2 MILLIGRAM(S): at 20:34

## 2021-05-11 RX ADMIN — OLANZAPINE 10 MILLIGRAM(S): 15 TABLET, FILM COATED ORAL at 20:35

## 2021-05-11 RX ADMIN — Medication 1 MILLIGRAM(S): at 09:04

## 2021-05-11 RX ADMIN — Medication 100 MILLIGRAM(S): at 09:04

## 2021-05-12 PROCEDURE — 99231 SBSQ HOSP IP/OBS SF/LOW 25: CPT

## 2021-05-12 RX ADMIN — Medication 100 MILLIGRAM(S): at 10:16

## 2021-05-12 RX ADMIN — OLANZAPINE 10 MILLIGRAM(S): 15 TABLET, FILM COATED ORAL at 20:26

## 2021-05-12 RX ADMIN — Medication 2 MILLIGRAM(S): at 20:26

## 2021-05-12 RX ADMIN — Medication 1 MILLIGRAM(S): at 10:15

## 2021-05-12 NOTE — BH SCALES AND SCREENS - NSBPRSCONCDISORG_PSY_ALL_CORE
2 = Very Mild - e.g., somewhat vague, but of doubtful clinical significance
2 = Very Mild - e.g., somewhat vague, but of doubtful clinical significance

## 2021-05-12 NOTE — BH SCALES AND SCREENS - NSBPRSBLUNTAFFECT_PSY_ALL_CORE
4 = Moderate – e.g., as above, but more intense, prolonged or frequent
4 = Moderate – e.g., as above, but more intense, prolonged or frequent

## 2021-05-12 NOTE — BH SCALES AND SCREENS - NSBPRSUNUTHOCON_PSY_ALL_CORE
5 = Moderately Severe – full delusional conviction, but delusion(s) has only occasional impact on behavior
2 = Very Mild – delusion(s) suspected or likely

## 2021-05-12 NOTE — BH SCALES AND SCREENS - NSBPRSSUSPIC_PSY_ALL_CORE
5 = Moderately Severe – pervasive suspiciousness, or frequent ideas of reference
4 = Moderate – more frequent suspiciousness, or transient ideas of reference

## 2021-05-13 PROCEDURE — 99231 SBSQ HOSP IP/OBS SF/LOW 25: CPT

## 2021-05-13 RX ADMIN — Medication 100 MILLIGRAM(S): at 08:17

## 2021-05-13 RX ADMIN — Medication 1 MILLIGRAM(S): at 08:16

## 2021-05-13 RX ADMIN — Medication 1 MILLIGRAM(S): at 17:40

## 2021-05-13 RX ADMIN — Medication 2 MILLIGRAM(S): at 20:32

## 2021-05-13 RX ADMIN — OLANZAPINE 10 MILLIGRAM(S): 15 TABLET, FILM COATED ORAL at 20:32

## 2021-05-14 PROCEDURE — 99231 SBSQ HOSP IP/OBS SF/LOW 25: CPT

## 2021-05-14 RX ADMIN — Medication 1 MILLIGRAM(S): at 10:28

## 2021-05-14 RX ADMIN — Medication 100 MILLIGRAM(S): at 10:29

## 2021-05-14 RX ADMIN — OLANZAPINE 10 MILLIGRAM(S): 15 TABLET, FILM COATED ORAL at 20:59

## 2021-05-14 RX ADMIN — Medication 2 MILLIGRAM(S): at 20:58

## 2021-05-14 RX ADMIN — Medication 1 MILLIGRAM(S): at 17:04

## 2021-05-15 RX ADMIN — Medication 1 MILLIGRAM(S): at 16:14

## 2021-05-15 RX ADMIN — OLANZAPINE 10 MILLIGRAM(S): 15 TABLET, FILM COATED ORAL at 20:21

## 2021-05-15 RX ADMIN — Medication 2 MILLIGRAM(S): at 20:21

## 2021-05-15 RX ADMIN — Medication 1 MILLIGRAM(S): at 08:31

## 2021-05-16 RX ORDER — HYDROXYZINE HCL 10 MG
50 TABLET ORAL EVERY 6 HOURS
Refills: 0 | Status: DISCONTINUED | OUTPATIENT
Start: 2021-05-16 | End: 2021-05-24

## 2021-05-16 RX ADMIN — OLANZAPINE 10 MILLIGRAM(S): 15 TABLET, FILM COATED ORAL at 20:26

## 2021-05-16 RX ADMIN — Medication 2 MILLIGRAM(S): at 14:31

## 2021-05-16 RX ADMIN — Medication 100 MILLIGRAM(S): at 10:06

## 2021-05-16 RX ADMIN — Medication 1 MILLIGRAM(S): at 10:06

## 2021-05-16 RX ADMIN — Medication 1 MILLIGRAM(S): at 16:10

## 2021-05-16 RX ADMIN — Medication 1 MILLIGRAM(S): at 13:50

## 2021-05-16 RX ADMIN — Medication 2 MILLIGRAM(S): at 20:27

## 2021-05-17 PROCEDURE — 99231 SBSQ HOSP IP/OBS SF/LOW 25: CPT

## 2021-05-17 RX ADMIN — Medication 1 MILLIGRAM(S): at 10:04

## 2021-05-17 RX ADMIN — Medication 100 MILLIGRAM(S): at 10:04

## 2021-05-17 RX ADMIN — Medication 2 MILLIGRAM(S): at 20:53

## 2021-05-17 RX ADMIN — OLANZAPINE 10 MILLIGRAM(S): 15 TABLET, FILM COATED ORAL at 20:52

## 2021-05-18 PROCEDURE — 99231 SBSQ HOSP IP/OBS SF/LOW 25: CPT

## 2021-05-18 RX ADMIN — Medication 2 MILLIGRAM(S): at 20:18

## 2021-05-18 RX ADMIN — OLANZAPINE 10 MILLIGRAM(S): 15 TABLET, FILM COATED ORAL at 20:18

## 2021-05-18 RX ADMIN — Medication 100 MILLIGRAM(S): at 09:10

## 2021-05-18 RX ADMIN — Medication 1 MILLIGRAM(S): at 19:04

## 2021-05-18 RX ADMIN — Medication 1 MILLIGRAM(S): at 09:10

## 2021-05-19 PROCEDURE — 99231 SBSQ HOSP IP/OBS SF/LOW 25: CPT

## 2021-05-19 RX ADMIN — Medication 50 MILLIGRAM(S): at 15:17

## 2021-05-19 RX ADMIN — Medication 2 MILLIGRAM(S): at 20:27

## 2021-05-19 RX ADMIN — Medication 1 MILLIGRAM(S): at 12:40

## 2021-05-19 RX ADMIN — OLANZAPINE 10 MILLIGRAM(S): 15 TABLET, FILM COATED ORAL at 20:27

## 2021-05-20 PROCEDURE — 99231 SBSQ HOSP IP/OBS SF/LOW 25: CPT

## 2021-05-20 RX ORDER — OLANZAPINE 15 MG/1
5 TABLET, FILM COATED ORAL EVERY 4 HOURS
Refills: 0 | Status: DISCONTINUED | OUTPATIENT
Start: 2021-05-20 | End: 2021-05-24

## 2021-05-20 RX ORDER — OLANZAPINE 15 MG/1
5 TABLET, FILM COATED ORAL EVERY 4 HOURS
Refills: 0 | Status: DISCONTINUED | OUTPATIENT
Start: 2021-05-20 | End: 2021-05-20

## 2021-05-20 RX ORDER — DIPHENHYDRAMINE HCL 50 MG
50 CAPSULE ORAL EVERY 4 HOURS
Refills: 0 | Status: DISCONTINUED | OUTPATIENT
Start: 2021-05-20 | End: 2021-05-24

## 2021-05-20 RX ADMIN — Medication 1 MILLIGRAM(S): at 10:49

## 2021-05-20 RX ADMIN — Medication 2 MILLIGRAM(S): at 20:01

## 2021-05-20 RX ADMIN — OLANZAPINE 10 MILLIGRAM(S): 15 TABLET, FILM COATED ORAL at 20:00

## 2021-05-20 NOTE — BH TREATMENT PLAN - NSTXDCHOUSINTERSW_PSY_ALL_CORE
SW will provide support, psycho-education, and discharge planning; while coordinating care with interdisciplinary treatment team.
BEBO met with pt, pt reported she wanted to sleep and would find SW later. BEBO communicated with Iam from [facility] regarding interview with pt last week and status of referral. BEBO faxed additional documents needed for application. BEBO communicated with staff [facility] ACT team regarding pt progress and tx.
SW will provide support, psycho-education, and discharge planning; while coordinating care with interdisciplinary treatment team.
SW met with to have consents signed, faxed to [facility] for residential referral. SW communicated with staff at [facility] regarding status. Reported they would have additional information this week, regarding placement for pt. SW met with team to assess pt progress and status of placement.

## 2021-05-20 NOTE — BH TREATMENT PLAN - NSDCCRITERIA_PSY_ALL_CORE
when pt is no more than 2 CGI

## 2021-05-20 NOTE — BH TREATMENT PLAN - NSTXPLANTHERAPYSESSIONSFT_PSY_ALL_CORE
05-16-21  --  Type of session: Individual  Level of patient participation: Participated with encouragement  Duration of participation: 15 minutes  Therapy conducted by: Psych rehab  Therapy Summary: Writer met with this patient in order to assess progress towards psychiatric rehabilitation goal over the past week. Patient was cooperative and pleasant with this writer. Patient expressed concern for her family, stating that she has not heard from them since she was admitted and is not sure why they are not returning her calls. Writer and patient discussed patient's adjustment to the unit. Patient reported that prior to being admitted, patient was homeless. Patient elaborated that since being here, she has been catching up on her sleep and is beginning to feel more energized. Writer and patient discussed the circumstances of what led to the patient being admitted. Patient explained that she attempted suicide by swallowing glass. Patient then explained that the doctors at the hospital informed her that there was no glass in her body. Patient endorsed that she believes they are saying that because they want the glass to be in her body so that she will die. Writer attempted to explore this further with the patient but the patient began expressing concern about her family once more. Writer asked the patient if she is currently experiencing suicidal ideation. Patient denied current suicidal ideation. Writer and patient discussed what coping skills she has been implementing since being admitted, however the patient abruptly ending the meeting stating that she wanted to call her family again. Patient has not attended any psychiatric rehabilitation groups over the past week. Patient is only visible on the milieu when making phone calls and collecting her meals. Patient isolates herself to her room and is often observed sleeping. Patient does not socialize with her peers. Psychiatric rehabilitation staff will continue to engage patient daily to encourage group attendance and assist patient in exploring effective coping skills to better manage symptoms.  
  05-09-21  Type of therapy: Leisure development  Type of session: Individual  Level of patient participation: Not engaged  Duration of participation: Less than 15 minutes  Therapy conducted by: Psych rehab  Therapy Summary: Writer attempted to meet with patient in order to discuss progress towards psychiatric rehabilitation goal over the past week. Patient denied to speak with this writer, therefore the following information was obtained from patient’s medical record and observations in the milieu. Patient is no longer on CO 1:1 for suicidality. Patient continues to be observed isolative and irritable with staff. Patient has attended one leisure group over the past week. Patient was intermittently visible in the milieu, however was mostly observed to remain in her room and sleep most of the day. Patient does not socialize with peers. Psychiatric rehabilitation staff will continue to engage patient daily to encourage group attendance and assist patient in exploring effective coping skills to better manage symptoms.

## 2021-05-20 NOTE — BH TREATMENT PLAN - NSTXCAREGIVERAGREEMENT_PSY_P_CORE
Patient does not have family/caregiver involved in care

## 2021-05-20 NOTE — BH TREATMENT PLAN - NSTXSUICIDGOAL_PSY_ALL_CORE
Will identify and utilize 2 coping skills
Talk to staff and ask for assistance when having suicidal wishes instead of acting out
Will verbalize a decrease in preoccupation with suicidal thoughts and / or intent to commit suicide to 2 on a 10-point scale
Will verbalize a decrease in preoccupation with suicidal thoughts and / or intent to commit suicide to 2 on a 10-point scale

## 2021-05-20 NOTE — BH TREATMENT PLAN - NSPTSTATEDGOAL_PSY_ALL_CORE
unable to state a goal at this time

## 2021-05-20 NOTE — BH TREATMENT PLAN - NSTXPSYCHOGOAL_PSY_ALL_CORE
Will identify 2 coping skills that assist with focus on reality
Will verbalize decreased distress related to psychosis to 2 on a 10-point scale
Will identify 2 coping skills that assist with focus on reality
Will identify 2 coping skills that assist with focus on reality

## 2021-05-20 NOTE — BH TREATMENT PLAN - NSTXSUICIDINTERPR_PSY_ALL_CORE
Patient has not demonstrated progress towards this psychiatric rehabiliation goal. Patient would benefit from attending psychiatric rehabiliation groups as well as meet with psych rehab staff individually to assist her in learning and utilzing effective coping skills for better symptoms management.
Patient would benefit from attending psychiatric rehabilitation groups and engaging with staff individually in order to identify and utilize effective coping skills to manage symptoms.
Patient would benefit from attending psychiatric rehabilitation groups and engaging with staff individually in order to identify and utilize effective coping skills to manage symptoms.
Patient has not demonstrated progress towards this psychiatric rehabiliation goal. Patient would benefit from increasing her attendance to psych rehab groups. Psych rehab staff will engage with patient daily to build rapport and assist her in learning and implement effective coping skills for better symptom management.

## 2021-05-20 NOTE — BH TREATMENT PLAN - NSTXPATIENTPARTICIPATE_PSY_ALL_CORE
Patient participated in identification of needs/problems/goals for treatment/Patient participated in development of after care plan
Patient participated in identification of needs/problems/goals for treatment

## 2021-05-20 NOTE — BH TREATMENT PLAN - NSTXPSYCHOINTERMD_PSY_ALL_CORE
zyprexa 10 mg qhs, ativan 2 mg bid
zyprexa 10 mg qhs, ativan 2 mg bid, thiamine

## 2021-05-20 NOTE — BH TREATMENT PLAN - NSBHPRIMARYDX_PSY_ALL_CORE
Schizoaffective disorder, manic type    

## 2021-05-21 PROCEDURE — 99231 SBSQ HOSP IP/OBS SF/LOW 25: CPT

## 2021-05-21 RX ORDER — OLANZAPINE 15 MG/1
1 TABLET, FILM COATED ORAL
Qty: 7 | Refills: 0
Start: 2021-05-21 | End: 2021-05-27

## 2021-05-21 RX ADMIN — OLANZAPINE 10 MILLIGRAM(S): 15 TABLET, FILM COATED ORAL at 20:19

## 2021-05-21 RX ADMIN — Medication 2 MILLIGRAM(S): at 09:33

## 2021-05-21 RX ADMIN — Medication 2 MILLIGRAM(S): at 20:19

## 2021-05-21 RX ADMIN — Medication 1 MILLIGRAM(S): at 12:40
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
## 2021-05-21 NOTE — BH DISCHARGE NOTE NURSING/SOCIAL WORK/PSYCH REHAB - NSDCPRGOAL_PSY_ALL_CORE
Patient has demonstrated progress towards psychiatric rehabilitation goals during current hospitalization. Patient has demonstrated daily medication compliance and is tolerating medications well. Patient was able to identify the benefits of medication compliance as it relates to mood and symptom improvement. Patient has demonstrated improved insight into the current episode and was able to identify feeling angry, if people treat her a certain way that provokes her to feel angry and feeling physically hot as warning signs that a crisis may be developing. Patient was able to identify taking medications, being outside and “letting things go” as effective coping skills to manage symptoms. Patient has not attended any psychiatric rehabilitation groups during current hospitalization despite prompting and encouragement. Patient was intermittently visible in the milieu, mostly observed as isolative and kept to herself. Patient was observed in minimal social interactions with peers. Patient was able to make needs known to staff. Patient reports she is looking forward to discharge. Patient was provided with a Press Ganey survey to complete prior to discharge.  Patient has demonstrated progress towards psychiatric rehabilitation goals during current hospitalization. Patient has demonstrated daily medication compliance and is tolerating medications well. Patient was able to identify the benefits of medication compliance as it relates to mood and symptom improvement. Patient reports improvement in depressive symptoms and sleep. Patient has demonstrated improved insight into the current episode and was able to identify feeling angry, if people treat her a certain way that provokes her to feel angry and feeling physically hot as warning signs that a crisis may be developing. Patient was able to identify taking medications, being outside and “letting things go” as effective coping skills to manage symptoms. Patient has not attended any psychiatric rehabilitation groups during current hospitalization despite prompting and encouragement. Patient was intermittently visible in the milieu, mostly observed as isolative and kept to herself. Patient was observed in minimal social interactions with peers. Patient was able to make needs known to staff. Patient denies suicidal ideation intent and plan prior to discharge. Patient reports she is looking forward to discharge. Patient was provided with a Press Ganey survey to complete prior to discharge.

## 2021-05-21 NOTE — BH DISCHARGE NOTE NURSING/SOCIAL WORK/PSYCH REHAB - NSDCPRRECOMMEND_PSY_ALL_CORE
Psychiatric rehabilitation staff recommends that patient continue to demonstrate daily medication compliance and continue to utilize identified coping skills for improved symptom management. Patient would benefit from going to [facility] and maintaining compliance with ACT and AOT for medication management and support.

## 2021-05-21 NOTE — BH INPATIENT PSYCHIATRY DISCHARGE NOTE - NSBHMETABOLIC_PSY_ALL_CORE_FT
BMI:   HbA1c: A1C with Estimated Average Glucose Result: 4.8 % (05-07-21 @ 10:10)    Glucose:   BP: --  Lipid Panel: Date/Time: 05-07-21 @ 10:10  Cholesterol, Serum: 158  Direct LDL: --  HDL Cholesterol, Serum: 49  Total Cholesterol/HDL Ration Measurement: --  Triglycerides, Serum: 100

## 2021-05-21 NOTE — BH DISCHARGE NOTE NURSING/SOCIAL WORK/PSYCH REHAB - DISCHARGE INSTRUCTIONS AFTERCARE APPOINTMENTS
In order to check the location, date, or time of your aftercare appointment, please refer to your Discharge Instructions Document given to you upon leaving the hospital.  If you have lost the instructions please call [phone]

## 2021-05-21 NOTE — BH INPATIENT PSYCHIATRY DISCHARGE NOTE - NSBHDCHANDOFFFT_PSY_ALL_CORE
Leighann Mcguire was provided with a hand off on pt's course and recommendations. Call back phone number was also provided if further details are needed.
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
## 2021-05-21 NOTE — BH INPATIENT PSYCHIATRY DISCHARGE NOTE - OTHER PAST PSYCHIATRIC HISTORY (INCLUDE DETAILS REGARDING ONSET, COURSE OF ILLNESS, INPATIENT/OUTPATIENT TREATMENT)
Patient with history of Schizoaffective disorder, unemployed, homeless, staying in motels, multiple inpatient psychiatric admissions, most recently at [facility] 12/10-12/27 2019, and followed by [facility].

## 2021-05-21 NOTE — BH DISCHARGE NOTE NURSING/SOCIAL WORK/PSYCH REHAB - NSCDUDCCRISIS_PSY_A_CORE
[facility] Well  1 (228) [facility]-WELL ([phone])  Text "WELL" to [phone]  Website: www.[employer]/.[employer] 1 ([phone] 731-XNEY (8385) Website: www.safehorizon.org/.National Suicide Prevention Lifeline 2 (360) 045-3018/.  [employer]  1 (483) LIFENET (998-2802)/.  [facility]  [address] [place] [address] [phone]   Hours:  Monday through Friday from 9 AM to 3 PM/.  [employer] - [facility] Crisis Line  7 [phone], Option 1

## 2021-05-21 NOTE — BH INPATIENT PSYCHIATRY DISCHARGE NOTE - NSDCMRMEDTOKEN_GEN_ALL_CORE_FT
LORazepam 1 mg oral tablet: 1 tab(s) orally 2 times a day x 30 days  at 9am and 6pm for anxiety MDD:2mg  OLANZapine 10 mg oral tablet: 1 tab(s) orally once a day x 30 days  at 6pm for psychosis    LORazepam 2 mg oral tablet: 1 tab(s) orally 2 times a day MDD:4 mg/day  OLANZapine 10 mg oral tablet: 1 tab(s) orally once a day x 7 days  at 6pm for psychosis

## 2021-05-21 NOTE — BH DISCHARGE NOTE NURSING/SOCIAL WORK/PSYCH REHAB - PATIENT PORTAL LINK FT
You can access the FollowMyHealth Patient Portal offered by [facility] by registering at the following website: [email or web address]. By joining [employer]’s FollowMyHealth portal, you will also be able to view your health information using other applications (apps) compatible with our system.

## 2021-05-21 NOTE — BH INPATIENT PSYCHIATRY DISCHARGE NOTE - NSBHSUICIDESTATUS_PSY_ALL_CORE
Assessment of suicidal and aggression risks was: Patient has significant chronic risks ( such as h/o  Schizoaffective disorder, suicide attempt, hospitalization ) but in view of described above significant improvement of symptoms, her willingness to adhere to treatment on an OP basis, close follow up by ACT team, availability of residential place, - in our assessment patient is not an acute suicidal/aggression risk at this time.   Patient was provided with extensive psychoeducation and with motivational counseling to encourage compliance and sobriety, on safe way of taking her medications. Patient was instructed on actions for crisis situations, understood and agreed to follow instructions for handling crisis: such as to come to ER or call 911 should patient feel that she is in danger of hurting self or others.  Patient was given Suicide Prevention Lifeline number 1-211.162.6023 and provided with instructions on its use. Safety plan was created for the patient.

## 2021-05-21 NOTE — BH INPATIENT PSYCHIATRY DISCHARGE NOTE - HOSPITAL COURSE
This is 25 yof, self-reported pmhx of Schizoaffective disorder and homelessness, who presented to the ED with CC of swallowing glass in [place]. Imaging was  negative for FB or acute process and thus pt was medically stable. Differential diagnosis includes schizoaffective disorder vs schizophrenia. Suicidality and psychosis at time of admission also signified pt is unable to care for self. 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 seen by ophthalmology, recommended antibiotics, positional/behavioral management, follow up 1-2 weeks .  Due to severe suicidality pt was on 1:1 CO upon admission.    Patient was restarted on Ativan 2 mg BID and Zyprexa 10 mg qhs. An attempt at increasing Zyprexa to 15 mg qhs was met with pt's resistance, so that dose was returned to 10 mg qhs.     Pt was very poorly motivated, paranoid, delusional, isolative and secretive, used sleep as maladaptive coping strategy. Nevertheless, after her meds were restarted she became more communicative. During the first few weeks patient displayed negativism, hostility and only limited interactions while having delusional thoughts. Over time her mood and affect improved she started to attend to her ADLs. She reported no longer being sad, denied any SI/I/P consistently. Preoccupations with paranoid delusional reality distortions decreased. 1:1 was stopped and patient has not been acting in self-harmful manner    While psychosis and affective symptoms have been improving behavioral problems became more apparent: tendency to self-isolate and at times manipulative style. They have been addressed by providing structure and establishing clear limits. Patient consistently declined participating in group therapy. She has not display a need for seclusions or restraints and has not been aggressive.    Medical: prior to her admission patient sustained orbital bones fructure; as per Ophthalmology recommendations she completed Augmentin 875/125 bid for 6 days ( for blowout fructure);  f/up ophthalmology consult 5/7/21 was not successful due to pt's refusal to cooperate with exam. Pt reports no complaints related to orbital fructure and declines Ophthalmology consult. She is advised to obtain Ophthalmology follow up on an OP basis.    Patient started to cooperate with post-discharge planning, including efforts to secure residential facility placement for her.  Blood work for lipids and Hg A1C and CBC with diff  ( 5/7/21) -( neutropenia stable, mild ).     Patient is overall doing better. Tolerates treatment well. This is 25 yof, self-reported pmhx of Schizoaffective disorder and homelessness, who presented to the ED with CC of swallowing glass in [place]. Imaging was  negative for FB or acute process and thus pt was medically stable. Differential diagnosis includes schizoaffective disorder vs schizophrenia. Suicidality and psychosis at time of admission also signified pt is unable to care for self. 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  Due to severe suicidality pt was on 1:1 CO upon admission.    Patient was restarted on Ativan 2 mg BID and Zyprexa 10 mg qhs. An attempt at increasing Zyprexa to 15 mg qhs was met with pt's resistance, so that dose was returned to 10 mg qhs.     Pt was very poorly motivated, paranoid, delusional, isolative and secretive, used sleep as maladaptive coping strategy. Nevertheless, after her meds were restarted she became more communicative. During the first few weeks patient displayed negativism, hostility and only limited interactions while having delusional thoughts. Over time her mood and affect improved she started to attend to her ADLs. She reported no longer being sad, denied any SI/I/P consistently. Preoccupations with paranoid delusional reality distortions decreased. 1:1 was stopped and patient has not been acting in self-harmful manner    While psychosis and affective symptoms have been improving behavioral problems became more apparent: tendency to self-isolate and at times manipulative style. They have been addressed by providing structure and establishing clear limits. Patient consistently declined participating in group therapy. She has not display a need for seclusions or restraints and has not been aggressive.    Medical: prior to her admission patient sustained orbital bones fructure; as per Ophthalmology recommendations she completed Augmentin 875/125 bid for 6 days ( for blowout fructure);  f/up ophthalmology consult 5/7/21 was not successful due to pt's refusal to cooperate with exam. Pt reports no complaints related to orbital fructure and declines Ophthalmology consult. She is advised to obtain Ophthalmology follow up on an OP basis.    Patient started to cooperate with post-discharge planning, including efforts to secure residential facility placement for her.  Blood work for lipids and Hg A1C and CBC with diff  ( 5/7/21) -( neutropenia stable, mild ).     Patient is overall doing better. Tolerates treatment well.     Patient responded to structure of the unit, medications, supportive interventions and is now consistently denying feeling overwhelmed, suicidal, or hopeless. She is in a good control and displays no active psychotic or manic symptoms. She is hopeful and excited of being discharged, she is not overwhelmed and reports improvement in sense of wellbeing. Patient is calm today, after sleeping well last night. She denies anhedonia. She denies any aggressive or homicidal i/i/p.  She is not overwhelmed or hopeless.   Denies s/h i/i/p. She is denying self- injurious urges. She has no motor slowing, no agitation, and no impairment of ADLs.   Assessment of suicidal and aggression risks was: Patient has significant chronic risks ( such as h/o  Schizoaffective disorder, suicide attempt, hospitalization ) but in view of described above significant improvement of symptoms, her willingness to adhere to treatment on an OP basis, close follow up by ACT team, availability of residential place, - in our assessment patient is not an acute suicidal/aggression risk at this time.   Patient was provided with extensive psychoeducation and with motivational counseling to encourage compliance and sobriety, on safe way of taking her medications. Patient was instructed on actions for crisis situations, understood and agreed to follow instructions for handling crisis: such as to come to ER or call 911 should patient feel that she is in danger of hurting self or others.  Patient was given Suicide Prevention Lifeline number 8-350-614-0262 and provided with instructions on its use. Safety plan was created for the patient.  Patient was advised to avoid driving until it is clear that her reactions are not impaired by medications.  Patient was also instructed to use contraception to avoid unplanned pregnancy while on medications.  Motivational counseling was provided.

## 2021-05-22 RX ADMIN — Medication 2 MILLIGRAM(S): at 20:00

## 2021-05-22 RX ADMIN — OLANZAPINE 10 MILLIGRAM(S): 15 TABLET, FILM COATED ORAL at 20:00

## 2021-05-22 RX ADMIN — Medication 1 MILLIGRAM(S): at 16:17

## 2021-05-22 RX ADMIN — Medication 2 MILLIGRAM(S): at 12:25

## 2021-05-23 VITALS — TEMPERATURE: 98 F

## 2021-05-23 RX ADMIN — OLANZAPINE 10 MILLIGRAM(S): 15 TABLET, FILM COATED ORAL at 20:24

## 2021-05-23 RX ADMIN — Medication 2 MILLIGRAM(S): at 09:53

## 2021-05-23 RX ADMIN — Medication 1 MILLIGRAM(S): at 16:23

## 2021-05-23 RX ADMIN — Medication 2 MILLIGRAM(S): at 20:23

## 2021-05-24 PROCEDURE — 99231 SBSQ HOSP IP/OBS SF/LOW 25: CPT

## 2021-05-24 RX ADMIN — Medication 2 MILLIGRAM(S): at 09:32

## 2021-05-24 NOTE — BH INPATIENT PSYCHIATRY PROGRESS NOTE - NSDCCRITERIA_PSY_ALL_CORE
when pt is no more than 2 CGI

## 2021-05-24 NOTE — BH INPATIENT PSYCHIATRY PROGRESS NOTE - NSTXPROBPSYCHO_PSY_ALL_CORE
PSYCHOTIC SYMPTOMS

## 2021-05-24 NOTE — BH INPATIENT PSYCHIATRY PROGRESS NOTE - NSTXSUICIDGOAL_PSY_ALL_CORE
Will verbalize a decrease in preoccupation with suicidal thoughts and / or intent to commit suicide to 2 on a 10-point scale
Talk to staff and ask for assistance when having suicidal wishes instead of acting out
Will identify and utilize 2 coping skills
Will identify and utilize 2 coping skills
Talk to staff and ask for assistance when having suicidal wishes instead of acting out
Talk to staff and ask for assistance when having suicidal wishes instead of acting out
Will identify and utilize 2 coping skills
Will verbalize a decrease in preoccupation with suicidal thoughts and / or intent to commit suicide to 2 on a 10-point scale
Will identify and utilize 2 coping skills
Will identify and utilize 2 coping skills
Will verbalize a decrease in preoccupation with suicidal thoughts and / or intent to commit suicide to 2 on a 10-point scale
Will identify and utilize 2 coping skills

## 2021-05-24 NOTE — BH INPATIENT PSYCHIATRY PROGRESS NOTE - NSTXPSYCHOINTERMD_PSY_ALL_CORE
zyprexa 10 mg qhs, ativan 2 mg bid, thiamine
zyprexa 10 mg qhs, ativan 2 mg bid
zyprexa 10 mg qhs, ativan 2 mg bid
zyprexa 10 mg qhs, ativan 2 mg bid, thiamine
zyprexa 10 mg qhs, ativan 2 mg bid
zyprexa 10 mg qhs, ativan 2 mg bid, thiamine

## 2021-05-24 NOTE — BH INPATIENT PSYCHIATRY PROGRESS NOTE - NSTXPSYCHODATETRGT_PSY_ALL_CORE
19-May-2021
12-May-2021
12-May-2021
19-May-2021
12-May-2021
19-May-2021
26-May-2021
26-May-2021
12-May-2021
12-May-2021
19-May-2021
19-May-2021
26-May-2021
26-May-2021

## 2021-05-24 NOTE — BH INPATIENT PSYCHIATRY PROGRESS NOTE - NSBHFUPINTERVALHXFT_PSY_A_CORE
Pt at times requests prns for anxiety. She is still intentionally isolative most of the time, but occasionally emerges from her room and is not disruptive. She refuses to cooperate with VS checks most of the time. a little more motivated taking showers.    c/o: Pt was less hostile in interview today and reported that her mood is "OK". She was reluctant to elaborate on reasons for her self-isolation, but nodded to clarification re: discomfort she feels when she is among people. She is interested in cooperating with efforts at residential placement. She reported no pain/complaints in her eye or eye site. sleeps well.    
Pt received PRN lorazepam yesterday 11:37 AM for agitation. Pt compliant with all standing medications. Pt continues to isolate self, spend most time in bed. Pt has f/u ophthalmology appt today 1:15 PM.    c/o: "everything's the same"     Pt states she feels the same as yesterday. She states that yesterday's housing interview was anxiety-inducing; when asked why, she says "I don't know". Pt says that "you are asking things that don't matter instead of helping me" and ends interview by not responding.    Vitals refused by pt.    MSE  On exam today, the patient is unkempt with poor hygiene. She appears older than stated age. She is very disheveled. In interview she is less hostile but only marginally cooperative; she is poorly motivated. No psychomotor agitation/retardation. She describes her mood as “good”. Her affect is less intense/dysphoric than on admission but continues to be constricted, irritable, appropriate. Her thought process : no apparent FTD. Her thought content cannot be fully assessed but includes acknowledgement of past SI. Her perception cannot be fully assessed for AH or VH as pt is not fully cooperative (patient reports that she does not want to talk of present or past experiences of voices or paranoia) and pt declined answering questions regarding psychotic sx. The patient currently denies SI/HI. Cognition grossly oriented, attention poor. Her memory is intact. Her insight and judgment are poor. Her impulse control is poor. She is less sedated, easily arousable in response to verbal stimuli.    Labs today  ANC 1.66, WBC 2.58, nl Hgb, Hct 46.2, MCHC 31.8  A1c wnl  
25 yof presented to the ED with a CC of suicide attempt and psychosis.     Pt received PRN lorazepam yesterday 5:35 PM. Pt has been compliant with all standing medications. Nursing reports she continues to be irritable but has been sleeping well.    C/o: "good"   Pt is not fully cooperative on interview. Pt is in her bed and speaks with her eyes closed and back turned for the duration of the interview. Pt states she is spending most of her time in bed as she has "no sleep on the outside" and she states, "I do what I want". The pt states that in her suicide attempt last week with swallowing glass, there was no trigger and that she felt that she "didn't want to live". The pt denies AH, VH, SI, HI. Pt states she was diagnosed with "schizophrenia with bipolar" 3 - 4 years ago. Pt was noted to not be using the head elevating wedge; pt was educated on importance of using wedge but pt was nonchalant in her response and refused team's help in readjusting the head elevator.    Pt [facility] [employer], Amy Mcguire [phone], was contacted today. Pending callback for additional collateral on pt as she is poor historian.  
Pt  is mostly isolative, but with improved ADLs. Not disruptive on the unit.    c/o: Pt is cooperative with short interviews. she asks not to switch on light in her room while being interviewed. She intentionally limits the length of interview. She reported feeling better, sleeping well, and not experiencing depression or SI. She denies thoughts consistent with IOR. Pt denies side effects.  
Chart reviewed and case discussed with treatment team.  No events reported overnight. Sleep and appetite is fair.  Patient was very irritable and refused to engage in interview. She stated "I'm good, I'm fine! Please leave me alone!" Patient is compliant with medications, no adverse effects reported. CO maintained for safety.  
Pt  is still intentionally isolative most of the time. She took a shower yesterday.    c/o: Pt is resentful of any questions and tolerates only short serial approaches. She retorted that God tells her what to do, but declined to say if she hears God's voice. Instead, she replied that "it is a private matter".     
Pt  is mostly isolative, but with improved ADLs.    c/o: Pt was partially cooperative and quickly declined to talk about our conversation yesterday ( she tends to feel paranoid after sh discloses her thoughts or history.) She reported feeling better, sleeping well, and not experiencing depression or SI. She denies thoughts consistent with IOR. Pt denies side effects.  
Pt at times requests prns for anxiety. She is still intentionally isolative most of the time, but occasionally emerges from her room and is not disruptive. She refuses to cooperate with VS checks most of the time.     c/o: Pt was denying all symptoms but  was reluctant to elaborate on reasons for her self-isolation. Patient denies feeling sedated and insisted that she just monet to stay in bed, away from people and think of nothing. She reported no pain/complaints in her eye or eye site. sleeps well.    
Pt at times requests prns for anxiety. She is isolative and hardly comes out of her room, uncomfortable with interactions.  On Friday 5/7/21 she cooperated with being taken for a follow up by Ophthalmology service, but became negativistic there and refused to cooperate with exam.   Today, when asked of the reasons why she refused, Patient just said " I just did not like him" and refused to elaborate further. She did not answer to efforts to explain to her the reason for such follow up; she did not answer inquiry re: possibility that she will cooperate with Ophthalmology follow up in the future.    c/o: "nothing, I just don't like people. I don't want to be touched." Patient did not answer any questions re: psychotic symptoms or content of her thoughts.    
Pt  is mostly isolative, but with improved ADLs. Not disruptive on the unit except for her demanding and inflexible style. Sleeps well. She is asking for Ativan prns     c/o: Pt is cooperative with short interviews, but easily becomes dismissive and antagonistic upon any attempts to encourage her to participate in groups or limit her desire to have unlimited access to prns. She denies fear, suspiciousness, depression, suicidal or aggressive I/i/p.   No pain in orbital area. No change in vision. Patient is not interested in cooperating with ophthalmology follow up while being in-patient.  
Pt  is mostly isolative, but reported to RN yesterday that she was upset about recent death in the family.    c/o: Pt was more verbal and tolerated longer interview today. she reported that she has been especially vulnerable due to her homelessness, but even beyond this, she feels that people want to harm her due to her "being too healthy, getting to high" as it happened with some known musical icons. She denies feeling sedated. She now denying having any depression or suicidality and refutes previous statement that she swallowed glass fragment  with suicidal intent; she is saying now that she does not know why she did this.  
Pt received PRN lorazepam yesterday 5:05 PM for agitation. Pt received no other PRNs. Pt compliant with all standing meds. Nursing reports poor ADLs (pt is still wearing the same gown as on admission) and pt's eye appears to be improving.    Spoke with Amy Mcguire ([phone]) who is on [facility] for collateral. Pt has AOT currently but multiple AOT removals and has unstable housing, which is a major trigger for the pt. Pt has been staying at a hotel in [place], [place] and had been offered housing but the pt refused. Pt had been doing well in past several weeks and ACT Team is unsure as to cause of her acute decompensation. Pt had been on olanzapine 15 mg qd + lorazepam 2 mg bid since October 2020. ACT team has good rapport with pt and offered to speak with pt to help in-patient team establish rapport.    C/o: "doing fine"    Pt states she is doing well. Pt has not arisen from her bed today except to get breakfast because the pt is "not interested" and "this is how [she] want[s] to live [her] life". Pt denies ocular pain. Pt is using the head elevator and is reminded about the importance of using it.    
Pt received PRN lorazepam daily over the weekend. Pt has been compliant with all standing medications. Nursing reports she continues to be irritable and entitled but did not make self-injurious or suicidal gestures and has been denying feeling suicidal any more. She remained on 1:1 over the weekend.    C/o: "fine"     Pt is less uncooperative than Friday but still does not fully engage in interview, responding with 1–2-word utterances with her back turned. Pt denies SI and states she had SI before admission due to lack of sleep as she had not been sleeping for "months". Pt has been homeless for past 2 years, living "everywhere". Pt had previously been on Zyprexa 10 mg and Ativan 2 mg bid. Pt asks to end interview abruptly upon efforts to clarify her difficult circumstances further.  
Pt seen for follow up of psychosis and SA by swallowing glass.    Pt received PRN lorazepam yesterday ~3 PM for agitation. Nursing reports that pt has been reclusive, spending most time in her bed. Pt was informed of 10:30 AM Zoom call for housing application; pt prepared self and was able to partake in the meeting. Pt became anxious after Zoom call and received PRN lorazepam.    c/o: "tired"    Pt states that she feels well. She states that she is sleeping all the time because "[she is] homeless and don't sleep when I'm out of the hospital" and states "you don't know what it's like to be homeless" and that "you're being nosy" when asked to elaborate further. Pt states that the housing Zoom call went well but "he asked too many questions that are none of his business". Pt abruptly ends the interview , stating "I'm done talking".    
Pt at times requests prns for anxiety. She is still intentionally isolative and hardly comes out of her room, uncomfortable with interactions.    c/o: "You are playing games, I don't trust you. I know because I am psychic." Patient insists that 15 mg of zyprexa is too sedating for her and insists on decreasing the dose to 10 mg. She asks not to lower her ativan as she relies on it against anxiety. Pt denies use of substance.     
Chart reviewed and case discussed with treatment team.  No events reported overnight. Sleep and appetite is fair.  Patient was slightly more engaged in interview this morning but still superficially cooperative. She denied any SI and stated that everything was fine with an irritable edge. Patient is compliant with medications, no adverse effects reported. CO maintained for safety.  
Pt  is mostly isolative, but with improved ADLs. Not disruptive on the unit. Sleeps well. She is accepting current meds regimen and no longer argus with staff.    c/o: Pt is cooperative with short interviews, in fact she was apologetic for tense interaction with team yesterday. She set up for the interview and sustained longer communications without becoming argumentative or negativistic. She denies sadness, anger, suicidal or homicidal I/i/p. She feels that she is getting ready for discharge to residential facility soon and plans to cooperate with recommendations. She still thinks that she will mostly stay to herself, as it is her experience that she does not tolerate well being around many people or people who might be too intense.    Pt was offered but declined COVID vaccination.  
Pt  is mostly isolative, but with improved ADLs. She has been in a good behavioral control. Not disruptive on the unit. Sleeps well.     c/o: Pt is cooperative with interview. she denies sadness, anger, paranoid feelings. She reports good sleep. she looks forward to discharge.    Pt was offered but declined COVID vaccination.

## 2021-05-24 NOTE — BH INPATIENT PSYCHIATRY PROGRESS NOTE - NSTXDCHOUSDATETRGT_PSY_ALL_CORE
11-May-2021
24-May-2021
11-May-2021
11-May-2021
18-May-2021
11-May-2021
18-May-2021
24-May-2021
11-May-2021
18-May-2021
24-May-2021
18-May-2021
18-May-2021
24-May-2021

## 2021-05-24 NOTE — BH INPATIENT PSYCHIATRY PROGRESS NOTE - NSTXSUICIDDATEEST_PSY_ALL_CORE
05-May-2021
19-May-2021
12-May-2021
19-May-2021
05-May-2021
02-May-2021
19-May-2021
02-May-2021
12-May-2021
05-May-2021
02-May-2021
19-May-2021
12-May-2021
02-May-2021

## 2021-05-24 NOTE — BH INPATIENT PSYCHIATRY PROGRESS NOTE - NSTXSUICIDDATETRGT_PSY_ALL_CORE
19-May-2021
26-May-2021
09-May-2021
23-May-2021
12-May-2021
26-May-2021
19-May-2021
12-May-2021
26-May-2021
09-May-2021
16-May-2021
12-May-2021
16-May-2021
19-May-2021
23-May-2021
26-May-2021

## 2021-05-24 NOTE — BH INPATIENT PSYCHIATRY PROGRESS NOTE - NSTXPROBSUICID_PSY_ALL_CORE
SUICIDE/SELF-INJURIOUS BEHAVIOR

## 2021-05-24 NOTE — BH INPATIENT PSYCHIATRY PROGRESS NOTE - PRN MEDS
[facility]       SUBJECTIVE:  Kerri Rocha is a 78 year old female who comes in for f/u on DEXA results. Her FRAX score is 20.0% for a major osteoporotic fracture and 8.0% for a hip fracture in the next 10 years. She does have upcoming dental work with a root canal and bridge planned. Her sister was on fosamax and didn't tolerate this. Kerri is also moving to a new home next week so is in process of packing.     Medications and allergies reviewed by me today.     OBJECTIVE:    /79  Pulse 76  Wt 56.7 kg (125 lb)  Breastfeeding? No  BMI 24.21 kg/m2   Wt Readings from Last 1 Encounters:   04/03/17 56.7 kg (125 lb)     General: Pleasant female, in NAD      ASSESSMENT/PLAN:  Pt is a 78 year old female here for f/u on DEXA scan, discussion of treatment. Discussed risks/benefits of fosamax including esophagitis and osteonecrosis of the jaw. Given her scores, I do recommend treatment with a bisphosphonate, but would wait until she is done with her dental work, since it is pretty significant dental work in the near future. She will call us when she is cleared by her dentist to start treatment. In the meantime, I will check labs for any secondary causes of osteoporosis.     Kerri was seen today for results.    Diagnoses and all orders for this visit:    Osteoporosis  Other osteoporosis without current pathological fracture   -     Vitamin D Deficiency; Future  -     Parathyroid Hormone Intact; Future  -     Phosphorus; Future  -     Calcium; Future  -     Magnesium; Future  -     TSH with free T4 reflex; Future    Neuropathy (H)  -     gabapentin (NEURONTIN) 400 MG capsule; Take 2 capsules in the morning, 3 at noon, and 4 in the evening.   Dosing change per neurology recommendations.      >50% of this 15 min visit spent in patient education/counseling.     Pt should return to clinic for f/u with me in PRN    Tammy Galvan MD  04/03/17    
MEDICATIONS  (PRN):  acetaminophen   Tablet .. 650 milliGRAM(s) Oral every 6 hours PRN Mild Pain (1 - 3), Moderate Pain (4 - 6)  haloperidol     Tablet 5 milliGRAM(s) Oral every 6 hours PRN agitation  haloperidol    Injectable 5 milliGRAM(s) IntraMuscular Once PRN severe agitation  LORazepam     Tablet 1 milliGRAM(s) Oral every 6 hours PRN anxiety  LORazepam     Tablet 2 milliGRAM(s) Oral every 6 hours PRN Agitation  LORazepam   Injectable 2 milliGRAM(s) IntraMuscular Once PRN severe agitation  
MEDICATIONS  (PRN):  acetaminophen   Tablet .. 650 milliGRAM(s) Oral every 6 hours PRN Mild Pain (1 - 3), Moderate Pain (4 - 6)  haloperidol     Tablet 5 milliGRAM(s) Oral every 6 hours PRN agitation  haloperidol    Injectable 5 milliGRAM(s) IntraMuscular Once PRN severe agitation  hydrOXYzine hydrochloride 50 milliGRAM(s) Oral every 6 hours PRN Anxiety  LORazepam     Tablet 1 milliGRAM(s) Oral every 6 hours PRN anxiety  LORazepam     Tablet 2 milliGRAM(s) Oral every 6 hours PRN Agitation  LORazepam   Injectable 2 milliGRAM(s) IntraMuscular Once PRN severe agitation  
MEDICATIONS  (PRN):  acetaminophen   Tablet .. 650 milliGRAM(s) Oral every 6 hours PRN Mild Pain (1 - 3), Moderate Pain (4 - 6)  haloperidol     Tablet 5 milliGRAM(s) Oral every 6 hours PRN agitation  haloperidol    Injectable 5 milliGRAM(s) IntraMuscular Once PRN severe agitation  LORazepam     Tablet 2 milliGRAM(s) Oral every 6 hours PRN Agitation  LORazepam   Injectable 2 milliGRAM(s) IntraMuscular Once PRN severe agitation  
MEDICATIONS  (PRN):  acetaminophen   Tablet .. 650 milliGRAM(s) Oral every 6 hours PRN Mild Pain (1 - 3), Moderate Pain (4 - 6)  diphenhydrAMINE   Injectable 50 milliGRAM(s) IntraMuscular every 4 hours PRN agitation  haloperidol     Tablet 5 milliGRAM(s) Oral every 6 hours PRN agitation  haloperidol    Injectable 5 milliGRAM(s) IntraMuscular Once PRN severe agitation  hydrOXYzine hydrochloride 50 milliGRAM(s) Oral every 6 hours PRN Anxiety  LORazepam     Tablet 1 milliGRAM(s) Oral every 24 hours PRN anxiety  LORazepam   Injectable 2 milliGRAM(s) IntraMuscular Once PRN severe agitation  OLANZapine 5 milliGRAM(s) Oral every 4 hours PRN psychotic anxiet  
MEDICATIONS  (PRN):  acetaminophen   Tablet .. 650 milliGRAM(s) Oral every 6 hours PRN Mild Pain (1 - 3), Moderate Pain (4 - 6)  diphenhydrAMINE   Injectable 50 milliGRAM(s) IntraMuscular every 4 hours PRN agitation  haloperidol     Tablet 5 milliGRAM(s) Oral every 6 hours PRN agitation  haloperidol    Injectable 5 milliGRAM(s) IntraMuscular Once PRN severe agitation  hydrOXYzine hydrochloride 50 milliGRAM(s) Oral every 6 hours PRN Anxiety  LORazepam     Tablet 1 milliGRAM(s) Oral every 24 hours PRN anxiety  OLANZapine 5 milliGRAM(s) Oral every 4 hours PRN psychotic anxiet  
MEDICATIONS  (PRN):  haloperidol     Tablet 5 milliGRAM(s) Oral every 6 hours PRN agitation  haloperidol    Injectable 5 milliGRAM(s) IntraMuscular Once PRN severe agitation  LORazepam     Tablet 2 milliGRAM(s) Oral every 6 hours PRN Agitation  LORazepam   Injectable 2 milliGRAM(s) IntraMuscular Once PRN severe agitation  
MEDICATIONS  (PRN):  acetaminophen   Tablet .. 650 milliGRAM(s) Oral every 6 hours PRN Mild Pain (1 - 3), Moderate Pain (4 - 6)  haloperidol     Tablet 5 milliGRAM(s) Oral every 6 hours PRN agitation  haloperidol    Injectable 5 milliGRAM(s) IntraMuscular Once PRN severe agitation  LORazepam     Tablet 1 milliGRAM(s) Oral every 6 hours PRN anxiety  LORazepam     Tablet 2 milliGRAM(s) Oral every 6 hours PRN Agitation  LORazepam   Injectable 2 milliGRAM(s) IntraMuscular Once PRN severe agitation  
MEDICATIONS  (PRN):  haloperidol     Tablet 5 milliGRAM(s) Oral every 6 hours PRN agitation  haloperidol    Injectable 5 milliGRAM(s) IntraMuscular Once PRN severe agitation  LORazepam     Tablet 2 milliGRAM(s) Oral every 6 hours PRN Agitation  LORazepam   Injectable 2 milliGRAM(s) IntraMuscular Once PRN severe agitation  
MEDICATIONS  (PRN):  haloperidol     Tablet 5 milliGRAM(s) Oral every 6 hours PRN agitation  haloperidol    Injectable 5 milliGRAM(s) IntraMuscular Once PRN severe agitation  LORazepam     Tablet 2 milliGRAM(s) Oral every 6 hours PRN Agitation  LORazepam   Injectable 2 milliGRAM(s) IntraMuscular Once PRN severe agitation  
MEDICATIONS  (PRN):  acetaminophen   Tablet .. 650 milliGRAM(s) Oral every 6 hours PRN Mild Pain (1 - 3), Moderate Pain (4 - 6)  haloperidol     Tablet 5 milliGRAM(s) Oral every 6 hours PRN agitation  haloperidol    Injectable 5 milliGRAM(s) IntraMuscular Once PRN severe agitation  hydrOXYzine hydrochloride 50 milliGRAM(s) Oral every 6 hours PRN Anxiety  LORazepam     Tablet 1 milliGRAM(s) Oral every 6 hours PRN anxiety  LORazepam     Tablet 2 milliGRAM(s) Oral every 6 hours PRN Agitation  LORazepam   Injectable 2 milliGRAM(s) IntraMuscular Once PRN severe agitation  
MEDICATIONS  (PRN):  acetaminophen   Tablet .. 650 milliGRAM(s) Oral every 6 hours PRN Mild Pain (1 - 3), Moderate Pain (4 - 6)  diphenhydrAMINE   Injectable 50 milliGRAM(s) IntraMuscular every 4 hours PRN agitation  haloperidol     Tablet 5 milliGRAM(s) Oral every 6 hours PRN agitation  haloperidol    Injectable 5 milliGRAM(s) IntraMuscular Once PRN severe agitation  hydrOXYzine hydrochloride 50 milliGRAM(s) Oral every 6 hours PRN Anxiety  LORazepam     Tablet 1 milliGRAM(s) Oral every 24 hours PRN anxiety  LORazepam   Injectable 2 milliGRAM(s) IntraMuscular Once PRN severe agitation  OLANZapine 5 milliGRAM(s) Oral every 4 hours PRN psychotic anxiet  
MEDICATIONS  (PRN):  acetaminophen   Tablet .. 650 milliGRAM(s) Oral every 6 hours PRN Mild Pain (1 - 3), Moderate Pain (4 - 6)  haloperidol     Tablet 5 milliGRAM(s) Oral every 6 hours PRN agitation  haloperidol    Injectable 5 milliGRAM(s) IntraMuscular Once PRN severe agitation  LORazepam     Tablet 1 milliGRAM(s) Oral every 6 hours PRN anxiety  LORazepam     Tablet 2 milliGRAM(s) Oral every 6 hours PRN Agitation  LORazepam   Injectable 2 milliGRAM(s) IntraMuscular Once PRN severe agitation  
MEDICATIONS  (PRN):  acetaminophen   Tablet .. 650 milliGRAM(s) Oral every 6 hours PRN Mild Pain (1 - 3), Moderate Pain (4 - 6)  haloperidol     Tablet 5 milliGRAM(s) Oral every 6 hours PRN agitation  haloperidol    Injectable 5 milliGRAM(s) IntraMuscular Once PRN severe agitation  hydrOXYzine hydrochloride 50 milliGRAM(s) Oral every 6 hours PRN Anxiety  LORazepam     Tablet 1 milliGRAM(s) Oral every 6 hours PRN anxiety  LORazepam     Tablet 2 milliGRAM(s) Oral every 6 hours PRN Agitation  LORazepam   Injectable 2 milliGRAM(s) IntraMuscular Once PRN severe agitation  
MEDICATIONS  (PRN):  acetaminophen   Tablet .. 650 milliGRAM(s) Oral every 6 hours PRN Mild Pain (1 - 3), Moderate Pain (4 - 6)  haloperidol     Tablet 5 milliGRAM(s) Oral every 6 hours PRN agitation  haloperidol    Injectable 5 milliGRAM(s) IntraMuscular Once PRN severe agitation  LORazepam     Tablet 1 milliGRAM(s) Oral every 6 hours PRN anxiety  LORazepam     Tablet 2 milliGRAM(s) Oral every 6 hours PRN Agitation  LORazepam   Injectable 2 milliGRAM(s) IntraMuscular Once PRN severe agitation  
MEDICATIONS  (PRN):  acetaminophen   Tablet .. 650 milliGRAM(s) Oral every 6 hours PRN Mild Pain (1 - 3), Moderate Pain (4 - 6)  haloperidol     Tablet 5 milliGRAM(s) Oral every 6 hours PRN agitation  haloperidol    Injectable 5 milliGRAM(s) IntraMuscular Once PRN severe agitation  LORazepam     Tablet 2 milliGRAM(s) Oral every 6 hours PRN Agitation  LORazepam   Injectable 2 milliGRAM(s) IntraMuscular Once PRN severe agitation  
MEDICATIONS  (PRN):  acetaminophen   Tablet .. 650 milliGRAM(s) Oral every 6 hours PRN Mild Pain (1 - 3), Moderate Pain (4 - 6)  haloperidol     Tablet 5 milliGRAM(s) Oral every 6 hours PRN agitation  haloperidol    Injectable 5 milliGRAM(s) IntraMuscular Once PRN severe agitation  LORazepam     Tablet 1 milliGRAM(s) Oral every 6 hours PRN anxiety  LORazepam     Tablet 2 milliGRAM(s) Oral every 6 hours PRN Agitation  LORazepam   Injectable 2 milliGRAM(s) IntraMuscular Once PRN severe agitation  

## 2021-05-24 NOTE — BH INPATIENT PSYCHIATRY PROGRESS NOTE - NSTXPSYCHODATEEST_PSY_ALL_CORE
05-May-2021
30-Apr-2021
12-May-2021
05-May-2021
12-May-2021
19-May-2021
12-May-2021
19-May-2021
19-May-2021
30-Apr-2021
05-May-2021
12-May-2021
05-May-2021
05-May-2021
12-May-2021
30-Apr-2021
19-May-2021

## 2021-05-24 NOTE — BH INPATIENT PSYCHIATRY PROGRESS NOTE - NSTXDCHOUSDATEEST_PSY_ALL_CORE
04-May-2021
11-May-2021
03-May-2021
04-May-2021
11-May-2021
03-May-2021
04-May-2021
03-May-2021
04-May-2021
03-May-2021
03-May-2021
11-May-2021
11-May-2021
03-May-2021
11-May-2021

## 2021-05-24 NOTE — BH INPATIENT PSYCHIATRY PROGRESS NOTE - NSBHATTESTSEENBY_PSY_A_CORE
attending Psychiatrist without NP/Trainee
attending Psychiatrist without NP/Trainee
NP without Attending Psychiatrist
attending Psychiatrist without NP/Trainee
NP without Attending Psychiatrist
attending Psychiatrist without NP/Trainee

## 2021-05-24 NOTE — BH INPATIENT PSYCHIATRY PROGRESS NOTE - CURRENT MEDICATION
MEDICATIONS  (STANDING):  LORazepam     Tablet 1 milliGRAM(s) Oral daily  LORazepam     Tablet 2 milliGRAM(s) Oral at bedtime  OLANZapine 10 milliGRAM(s) Oral at bedtime  thiamine 100 milliGRAM(s) Oral daily    MEDICATIONS  (PRN):  acetaminophen   Tablet .. 650 milliGRAM(s) Oral every 6 hours PRN Mild Pain (1 - 3), Moderate Pain (4 - 6)  haloperidol     Tablet 5 milliGRAM(s) Oral every 6 hours PRN agitation  haloperidol    Injectable 5 milliGRAM(s) IntraMuscular Once PRN severe agitation  LORazepam     Tablet 1 milliGRAM(s) Oral every 6 hours PRN anxiety  LORazepam     Tablet 2 milliGRAM(s) Oral every 6 hours PRN Agitation  LORazepam   Injectable 2 milliGRAM(s) IntraMuscular Once PRN severe agitation  
MEDICATIONS  (STANDING):  LORazepam     Tablet 2 milliGRAM(s) Oral at bedtime  LORazepam     Tablet 1 milliGRAM(s) Oral daily  OLANZapine 10 milliGRAM(s) Oral at bedtime  thiamine 100 milliGRAM(s) Oral daily    MEDICATIONS  (PRN):  acetaminophen   Tablet .. 650 milliGRAM(s) Oral every 6 hours PRN Mild Pain (1 - 3), Moderate Pain (4 - 6)  haloperidol     Tablet 5 milliGRAM(s) Oral every 6 hours PRN agitation  haloperidol    Injectable 5 milliGRAM(s) IntraMuscular Once PRN severe agitation  LORazepam     Tablet 1 milliGRAM(s) Oral every 6 hours PRN anxiety  LORazepam     Tablet 2 milliGRAM(s) Oral every 6 hours PRN Agitation  LORazepam   Injectable 2 milliGRAM(s) IntraMuscular Once PRN severe agitation  
MEDICATIONS  (STANDING):  LORazepam     Tablet 2 milliGRAM(s) Oral two times a day  OLANZapine 10 milliGRAM(s) Oral at bedtime    MEDICATIONS  (PRN):  acetaminophen   Tablet .. 650 milliGRAM(s) Oral every 6 hours PRN Mild Pain (1 - 3), Moderate Pain (4 - 6)  diphenhydrAMINE   Injectable 50 milliGRAM(s) IntraMuscular every 4 hours PRN agitation  haloperidol     Tablet 5 milliGRAM(s) Oral every 6 hours PRN agitation  haloperidol    Injectable 5 milliGRAM(s) IntraMuscular Once PRN severe agitation  hydrOXYzine hydrochloride 50 milliGRAM(s) Oral every 6 hours PRN Anxiety  LORazepam     Tablet 1 milliGRAM(s) Oral every 24 hours PRN anxiety  LORazepam   Injectable 2 milliGRAM(s) IntraMuscular Once PRN severe agitation  OLANZapine 5 milliGRAM(s) Oral every 4 hours PRN psychotic anxiet  
MEDICATIONS  (STANDING):  amoxicillin  875 milliGRAM(s)/clavulanate 1 Tablet(s) Oral two times a day  LORazepam     Tablet 2 milliGRAM(s) Oral two times a day  OLANZapine 10 milliGRAM(s) Oral at bedtime  thiamine 100 milliGRAM(s) Oral daily    MEDICATIONS  (PRN):  haloperidol     Tablet 5 milliGRAM(s) Oral every 6 hours PRN agitation  haloperidol    Injectable 5 milliGRAM(s) IntraMuscular Once PRN severe agitation  LORazepam     Tablet 2 milliGRAM(s) Oral every 6 hours PRN Agitation  LORazepam   Injectable 2 milliGRAM(s) IntraMuscular Once PRN severe agitation  
MEDICATIONS  (STANDING):  LORazepam     Tablet 2 milliGRAM(s) Oral two times a day  OLANZapine 10 milliGRAM(s) Oral at bedtime    MEDICATIONS  (PRN):  acetaminophen   Tablet .. 650 milliGRAM(s) Oral every 6 hours PRN Mild Pain (1 - 3), Moderate Pain (4 - 6)  diphenhydrAMINE   Injectable 50 milliGRAM(s) IntraMuscular every 4 hours PRN agitation  haloperidol     Tablet 5 milliGRAM(s) Oral every 6 hours PRN agitation  haloperidol    Injectable 5 milliGRAM(s) IntraMuscular Once PRN severe agitation  hydrOXYzine hydrochloride 50 milliGRAM(s) Oral every 6 hours PRN Anxiety  LORazepam     Tablet 1 milliGRAM(s) Oral every 24 hours PRN anxiety  OLANZapine 5 milliGRAM(s) Oral every 4 hours PRN psychotic anxiet  
MEDICATIONS  (STANDING):  LORazepam     Tablet 1 milliGRAM(s) Oral daily  LORazepam     Tablet 2 milliGRAM(s) Oral at bedtime  OLANZapine 10 milliGRAM(s) Oral at bedtime  thiamine 100 milliGRAM(s) Oral daily    MEDICATIONS  (PRN):  acetaminophen   Tablet .. 650 milliGRAM(s) Oral every 6 hours PRN Mild Pain (1 - 3), Moderate Pain (4 - 6)  haloperidol     Tablet 5 milliGRAM(s) Oral every 6 hours PRN agitation  haloperidol    Injectable 5 milliGRAM(s) IntraMuscular Once PRN severe agitation  hydrOXYzine hydrochloride 50 milliGRAM(s) Oral every 6 hours PRN Anxiety  LORazepam     Tablet 1 milliGRAM(s) Oral every 6 hours PRN anxiety  LORazepam     Tablet 2 milliGRAM(s) Oral every 6 hours PRN Agitation  LORazepam   Injectable 2 milliGRAM(s) IntraMuscular Once PRN severe agitation  
MEDICATIONS  (STANDING):  LORazepam     Tablet 2 milliGRAM(s) Oral two times a day  OLANZapine 10 milliGRAM(s) Oral at bedtime    MEDICATIONS  (PRN):  acetaminophen   Tablet .. 650 milliGRAM(s) Oral every 6 hours PRN Mild Pain (1 - 3), Moderate Pain (4 - 6)  diphenhydrAMINE   Injectable 50 milliGRAM(s) IntraMuscular every 4 hours PRN agitation  haloperidol     Tablet 5 milliGRAM(s) Oral every 6 hours PRN agitation  haloperidol    Injectable 5 milliGRAM(s) IntraMuscular Once PRN severe agitation  hydrOXYzine hydrochloride 50 milliGRAM(s) Oral every 6 hours PRN Anxiety  LORazepam     Tablet 1 milliGRAM(s) Oral every 24 hours PRN anxiety  LORazepam   Injectable 2 milliGRAM(s) IntraMuscular Once PRN severe agitation  OLANZapine 5 milliGRAM(s) Oral every 4 hours PRN psychotic anxiet  
MEDICATIONS  (STANDING):  amoxicillin  875 milliGRAM(s)/clavulanate 1 Tablet(s) Oral two times a day  LORazepam     Tablet 2 milliGRAM(s) Oral two times a day  OLANZapine 10 milliGRAM(s) Oral at bedtime  thiamine 100 milliGRAM(s) Oral daily    MEDICATIONS  (PRN):  acetaminophen   Tablet .. 650 milliGRAM(s) Oral every 6 hours PRN Mild Pain (1 - 3), Moderate Pain (4 - 6)  haloperidol     Tablet 5 milliGRAM(s) Oral every 6 hours PRN agitation  haloperidol    Injectable 5 milliGRAM(s) IntraMuscular Once PRN severe agitation  LORazepam     Tablet 2 milliGRAM(s) Oral every 6 hours PRN Agitation  LORazepam   Injectable 2 milliGRAM(s) IntraMuscular Once PRN severe agitation  
MEDICATIONS  (STANDING):  LORazepam     Tablet 1 milliGRAM(s) Oral daily  LORazepam     Tablet 2 milliGRAM(s) Oral at bedtime  OLANZapine 10 milliGRAM(s) Oral at bedtime  thiamine 100 milliGRAM(s) Oral daily    MEDICATIONS  (PRN):  acetaminophen   Tablet .. 650 milliGRAM(s) Oral every 6 hours PRN Mild Pain (1 - 3), Moderate Pain (4 - 6)  haloperidol     Tablet 5 milliGRAM(s) Oral every 6 hours PRN agitation  haloperidol    Injectable 5 milliGRAM(s) IntraMuscular Once PRN severe agitation  LORazepam     Tablet 2 milliGRAM(s) Oral every 6 hours PRN Agitation  LORazepam     Tablet 1 milliGRAM(s) Oral every 6 hours PRN anxiety  LORazepam   Injectable 2 milliGRAM(s) IntraMuscular Once PRN severe agitation  
MEDICATIONS  (STANDING):  LORazepam     Tablet 2 milliGRAM(s) Oral at bedtime  OLANZapine 10 milliGRAM(s) Oral at bedtime    MEDICATIONS  (PRN):  acetaminophen   Tablet .. 650 milliGRAM(s) Oral every 6 hours PRN Mild Pain (1 - 3), Moderate Pain (4 - 6)  haloperidol     Tablet 5 milliGRAM(s) Oral every 6 hours PRN agitation  haloperidol    Injectable 5 milliGRAM(s) IntraMuscular Once PRN severe agitation  hydrOXYzine hydrochloride 50 milliGRAM(s) Oral every 6 hours PRN Anxiety  LORazepam     Tablet 1 milliGRAM(s) Oral every 6 hours PRN anxiety  LORazepam     Tablet 2 milliGRAM(s) Oral every 6 hours PRN Agitation  LORazepam   Injectable 2 milliGRAM(s) IntraMuscular Once PRN severe agitation  
MEDICATIONS  (STANDING):  amoxicillin  875 milliGRAM(s)/clavulanate 1 Tablet(s) Oral two times a day  LORazepam     Tablet 2 milliGRAM(s) Oral two times a day  OLANZapine 10 milliGRAM(s) Oral at bedtime  thiamine 100 milliGRAM(s) Oral daily    MEDICATIONS  (PRN):  haloperidol     Tablet 5 milliGRAM(s) Oral every 6 hours PRN agitation  haloperidol    Injectable 5 milliGRAM(s) IntraMuscular Once PRN severe agitation  LORazepam     Tablet 2 milliGRAM(s) Oral every 6 hours PRN Agitation  LORazepam   Injectable 2 milliGRAM(s) IntraMuscular Once PRN severe agitation  
MEDICATIONS  (STANDING):  amoxicillin  875 milliGRAM(s)/clavulanate 1 Tablet(s) Oral two times a day  LORazepam     Tablet 2 milliGRAM(s) Oral at bedtime  LORazepam     Tablet 1 milliGRAM(s) Oral daily  OLANZapine 10 milliGRAM(s) Oral at bedtime  thiamine 100 milliGRAM(s) Oral daily    MEDICATIONS  (PRN):  acetaminophen   Tablet .. 650 milliGRAM(s) Oral every 6 hours PRN Mild Pain (1 - 3), Moderate Pain (4 - 6)  haloperidol     Tablet 5 milliGRAM(s) Oral every 6 hours PRN agitation  haloperidol    Injectable 5 milliGRAM(s) IntraMuscular Once PRN severe agitation  LORazepam     Tablet 1 milliGRAM(s) Oral every 6 hours PRN anxiety  LORazepam     Tablet 2 milliGRAM(s) Oral every 6 hours PRN Agitation  LORazepam   Injectable 2 milliGRAM(s) IntraMuscular Once PRN severe agitation  
MEDICATIONS  (STANDING):  amoxicillin  875 milliGRAM(s)/clavulanate 1 Tablet(s) Oral two times a day  LORazepam     Tablet 2 milliGRAM(s) Oral at bedtime  LORazepam     Tablet 1 milliGRAM(s) Oral daily  OLANZapine 10 milliGRAM(s) Oral at bedtime  thiamine 100 milliGRAM(s) Oral daily    MEDICATIONS  (PRN):  acetaminophen   Tablet .. 650 milliGRAM(s) Oral every 6 hours PRN Mild Pain (1 - 3), Moderate Pain (4 - 6)  haloperidol     Tablet 5 milliGRAM(s) Oral every 6 hours PRN agitation  haloperidol    Injectable 5 milliGRAM(s) IntraMuscular Once PRN severe agitation  LORazepam     Tablet 2 milliGRAM(s) Oral every 6 hours PRN Agitation  LORazepam   Injectable 2 milliGRAM(s) IntraMuscular Once PRN severe agitation  
MEDICATIONS  (STANDING):  LORazepam     Tablet 2 milliGRAM(s) Oral at bedtime  LORazepam     Tablet 1 milliGRAM(s) Oral daily  OLANZapine 15 milliGRAM(s) Oral at bedtime  thiamine 100 milliGRAM(s) Oral daily    MEDICATIONS  (PRN):  acetaminophen   Tablet .. 650 milliGRAM(s) Oral every 6 hours PRN Mild Pain (1 - 3), Moderate Pain (4 - 6)  haloperidol     Tablet 5 milliGRAM(s) Oral every 6 hours PRN agitation  haloperidol    Injectable 5 milliGRAM(s) IntraMuscular Once PRN severe agitation  LORazepam     Tablet 1 milliGRAM(s) Oral every 6 hours PRN anxiety  LORazepam     Tablet 2 milliGRAM(s) Oral every 6 hours PRN Agitation  LORazepam   Injectable 2 milliGRAM(s) IntraMuscular Once PRN severe agitation  
MEDICATIONS  (STANDING):  LORazepam     Tablet 2 milliGRAM(s) Oral at bedtime  OLANZapine 10 milliGRAM(s) Oral at bedtime    MEDICATIONS  (PRN):  acetaminophen   Tablet .. 650 milliGRAM(s) Oral every 6 hours PRN Mild Pain (1 - 3), Moderate Pain (4 - 6)  haloperidol     Tablet 5 milliGRAM(s) Oral every 6 hours PRN agitation  haloperidol    Injectable 5 milliGRAM(s) IntraMuscular Once PRN severe agitation  hydrOXYzine hydrochloride 50 milliGRAM(s) Oral every 6 hours PRN Anxiety  LORazepam     Tablet 1 milliGRAM(s) Oral every 6 hours PRN anxiety  LORazepam     Tablet 2 milliGRAM(s) Oral every 6 hours PRN Agitation  LORazepam   Injectable 2 milliGRAM(s) IntraMuscular Once PRN severe agitation  
MEDICATIONS  (STANDING):  amoxicillin  875 milliGRAM(s)/clavulanate 1 Tablet(s) Oral two times a day  LORazepam     Tablet 2 milliGRAM(s) Oral two times a day  OLANZapine 10 milliGRAM(s) Oral at bedtime  thiamine 100 milliGRAM(s) Oral daily    MEDICATIONS  (PRN):  haloperidol     Tablet 5 milliGRAM(s) Oral every 6 hours PRN agitation  haloperidol    Injectable 5 milliGRAM(s) IntraMuscular Once PRN severe agitation  LORazepam     Tablet 2 milliGRAM(s) Oral every 6 hours PRN Agitation  LORazepam   Injectable 2 milliGRAM(s) IntraMuscular Once PRN severe agitation  
MEDICATIONS  (STANDING):  LORazepam     Tablet 2 milliGRAM(s) Oral at bedtime  OLANZapine 15 milliGRAM(s) Oral at bedtime  thiamine 100 milliGRAM(s) Oral daily    MEDICATIONS  (PRN):  acetaminophen   Tablet .. 650 milliGRAM(s) Oral every 6 hours PRN Mild Pain (1 - 3), Moderate Pain (4 - 6)  haloperidol     Tablet 5 milliGRAM(s) Oral every 6 hours PRN agitation  haloperidol    Injectable 5 milliGRAM(s) IntraMuscular Once PRN severe agitation  LORazepam     Tablet 1 milliGRAM(s) Oral every 6 hours PRN anxiety  LORazepam     Tablet 2 milliGRAM(s) Oral every 6 hours PRN Agitation  LORazepam   Injectable 2 milliGRAM(s) IntraMuscular Once PRN severe agitation  
MEDICATIONS  (STANDING):  LORazepam     Tablet 2 milliGRAM(s) Oral at bedtime  LORazepam     Tablet 1 milliGRAM(s) Oral daily  OLANZapine 15 milliGRAM(s) Oral at bedtime  thiamine 100 milliGRAM(s) Oral daily    MEDICATIONS  (PRN):  acetaminophen   Tablet .. 650 milliGRAM(s) Oral every 6 hours PRN Mild Pain (1 - 3), Moderate Pain (4 - 6)  haloperidol     Tablet 5 milliGRAM(s) Oral every 6 hours PRN agitation  haloperidol    Injectable 5 milliGRAM(s) IntraMuscular Once PRN severe agitation  LORazepam     Tablet 1 milliGRAM(s) Oral every 6 hours PRN anxiety  LORazepam     Tablet 2 milliGRAM(s) Oral every 6 hours PRN Agitation  LORazepam   Injectable 2 milliGRAM(s) IntraMuscular Once PRN severe agitation

## 2021-05-24 NOTE — BH INPATIENT PSYCHIATRY PROGRESS NOTE - NSBHCONSULTIPREASON_PSY_A_CORE
danger to self; mental illness expected to respond to inpatient care
other reason
danger to self; mental illness expected to respond to inpatient care

## 2021-05-24 NOTE — BH INPATIENT PSYCHIATRY PROGRESS NOTE - NSICDXBHPRIMARYDX_PSY_ALL_CORE
Schizoaffective disorder, manic type   F25.0  

## 2021-05-24 NOTE — BH INPATIENT PSYCHIATRY PROGRESS NOTE - NSTXPSYCHOGOAL_PSY_ALL_CORE
Will verbalize decreased distress related to psychosis to 2 on a 10-point scale
Will identify 2 coping skills that assist with focus on reality
Will verbalize decreased distress related to psychosis to 2 on a 10-point scale
Will identify 2 coping skills that assist with focus on reality
Will identify 2 coping skills that assist with focus on reality
Will verbalize decreased distress related to psychosis to 2 on a 10-point scale
Will identify 2 coping skills that assist with focus on reality
Will verbalize decreased distress related to psychosis to 2 on a 10-point scale
Will verbalize decreased distress related to psychosis to 2 on a 10-point scale
Will identify 2 coping skills that assist with focus on reality

## 2021-05-24 NOTE — BH INPATIENT PSYCHIATRY PROGRESS NOTE - MSE OPTIONS
Unstructured MSE
Structured MSE
Unstructured MSE
Structured MSE
Unstructured MSE

## 2021-05-24 NOTE — BH INPATIENT PSYCHIATRY PROGRESS NOTE - NSTXPROBDCHOUS_PSY_ALL_CORE
DISCHARGE ISSUE - LACK OF APPROPRIATE HOUSING

## 2021-05-24 NOTE — BH INPATIENT PSYCHIATRY PROGRESS NOTE - NSTREATMENTCERTY_PSY_ALL_CORE

## 2021-05-24 NOTE — BH INPATIENT PSYCHIATRY PROGRESS NOTE - NSTXDCHOUSGOAL_PSY_ALL_CORE
Will agree to participate in housing process

## 2021-05-24 NOTE — BH INPATIENT PSYCHIATRY PROGRESS NOTE - NSCGISEVERILLNESS_PSY_ALL_CORE
5 = Markedly ill - intrusive symptoms that distinctly impair social/occupational function or cause intrusive levels of distress
5 = Markedly ill - intrusive symptoms that distinctly impair social/occupational function or cause intrusive levels of distress
6 = Severely ill - disruptive pathology, behavior and function are frequently influenced by symptoms, may require assistance from others
5 = Markedly ill - intrusive symptoms that distinctly impair social/occupational function or cause intrusive levels of distress
6 = Severely ill - disruptive pathology, behavior and function are frequently influenced by symptoms, may require assistance from others
5 = Markedly ill - intrusive symptoms that distinctly impair social/occupational function or cause intrusive levels of distress
5 = Markedly ill - intrusive symptoms that distinctly impair social/occupational function or cause intrusive levels of distress

## 2021-05-24 NOTE — BH INPATIENT PSYCHIATRY PROGRESS NOTE - NSTXSUICIDPROGRES_PSY_ALL_CORE
Improving
No Change
Improving
No Change
Improving
No Change
Improving
Improving

## 2021-05-24 NOTE — BH INPATIENT PSYCHIATRY PROGRESS NOTE - NSTXPSYCHOPROGRES_PSY_ALL_CORE
Improving

## 2021-05-24 NOTE — BH INPATIENT PSYCHIATRY PROGRESS NOTE - NSBHLEGALSTATUSDATE_PSY_ALL_CORE
17-May-2021 14:51

## 2021-05-24 NOTE — BH INPATIENT PSYCHIATRY PROGRESS NOTE - MSE UNSTRUCTURED FT
Vitals refused by pt    MSE  On exam today, the patient is unkempt with poor hygiene. She appears older than stated age. She is very dishevelled. In interview she is less hostile but only marginally cooperative; she is  poorly motivated. No psychomotor agitation/retardation. She describes her mood as “tired”. Her affect is less intense/dysphoric than on admission but continues to be constricted, irritable, appropriate. Her thought process : no apparent FTD. Her thought content cannot be fully assessed but includes acknowledgement of past SI. Her perception cannot be fully assessed for AH or VH as pt is not fully cooperative ( patient reports that she does not want to talk of present or past experiences of voices or paranoia) and pt declined answering questions regarding psychotic sx. The patient currently denies SI/HI. Cognition grossly oriented, attention poor. Her memory is intact. Her insight and judgment are poor. Her impulse control is poor. She is less sedated, easily arousable in response to verbal stimuli.    
Vitals refused by pt.    MSE  On exam today, the patient is dishevelled. She is marginally cooperative. She is less hostile but with considerable neagivism in her attitude. Pt has nl rate of speech. No psychomotor agitation/retardation. She describes her mood as “fine”. Her affect is less dysphoric , constricted, with irritable adge, appropriate. Her thought process : no apparent FTD. + delusions of being a psychic and conviction that people are "playing games with her, paranoia. Denies  AH, VH. The patient does not engage in exploring possible suicidality/aggression, but she has not manifested such symptoms since admission. Cognition grossly oriented, attention poor. Her memory is intact. Her insight and judgment are poor. Her impulse control is poor. She is less sedated, easily arouse able  in response to verbal stimuli.    Labs :  ANC 1.66, WBC 3.58, nl Hgb, Hct 46.2, MCHC 31.8 - stable   A1c wnl  Lipids WNL except low HDL -49      
Vitals - stable    MSE  On exam today, the patient is dishevelled but with fair hygiene. She is a little more cooperative. She is less negativistic  and more verbal. Pt has nl rate of speech. No psychomotor agitation/retardation. She describes her mood as “fine”. Her affect is less dysphoric , yang in range, less irritable, appropriate. Her thought process : no apparent FTD. + delusional ideas and  paranoia. Not clear if Pt has AV/AH as she avoids answering questions re: perceptual abnormalities.  The patient denies S/H I/i/p. Cognition grossly oriented, attention poor. Her memory is intact. Her insight and judgment are poor. Her impulse control is poor. She is less sedated, easily arouse able  in response to verbal stimuli.    Labs :  ANC 1.66, WBC 3.58, nl Hgb, Hct 46.2, MCHC 31.8 - stable   A1c wnl  Lipids WNL except low HDL -49      
Vitals refused by pt.    H[place]  On exam today, the patient is very dishevelled and uncooperative. She is very disheveled. In interview she is negativisticand is poorly motivated. No psychomotor agitation/retardation. She describes her mood as “fine”. Her affect is dysphoric , constricted, irritable, appropriate. Her thought process : no apparent FTD. Her thought content : impoverished. Denies  AH, VH ; refused answering questions re: paranoia, but her behavior and attitude suggest hypervigilance. The patient does not engage in exploring possible suicidality/aggression, but she has not manifested such symptoms since admission. Cognition grossly oriented, attention poor. Her memory is intact. Her insight and judgment are poor. Her impulse control is poor. She is less sedated, easily arouse able  in response to verbal stimuli.    Labs :  ANC 1.66, WBC 3.58, nl Hgb, Hct 46.2, MCHC 31.8 - stable   A1c wnl  Lipids WNL except low HDL -49      
Vitals refused by pt.    MSE  On exam today, the patient is dishevelled and displays negativism and uncooperative attitude. avoids eye contact. nl rate of speech. No psychomotor agitation/retardation. She describes her mood as “fine”. Her affect is dysphoric , constricted, irritable, appropriate. Her thought process : no apparent FTD. + delusions of being a psychic and conviction that people are "playing games, paranoia. Denies  AH, VH. The patient does not engage in exploring possible suicidality/aggression, but she has not manifested such symptoms since admission. Cognition grossly oriented, attention poor. Her memory is intact. Her insight and judgment are poor. Her impulse control is poor. She is less sedated, easily arouse able  in response to verbal stimuli.    Labs :  ANC 1.66, WBC 3.58, nl Hgb, Hct 46.2, MCHC 31.8 - stable   A1c wnl  Lipids WNL except low HDL -49      
Vitals - stable    MSE  On exam today, the patient is dishevelled. only partially cooperative with interview but not hostile She is mistrustful. Pt has nl rate of speech. No psychomotor agitation/retardation. She describes her mood as “fine”. Her affect is wider, yang in range, less dysphoric, appropriate. Her thought process : no apparent FTD. + delusional ideas and  paranoia. Not clear if Pt has AV/AH as she avoids answering questions re: perceptual abnormalities.  The patient denies S/H I/i/p. Cognition grossly oriented, attention poor. Her memory is intact. Her insight and judgment are poor. Her impulse control is poor. She is less sedated, easily arouse able  in response to verbal stimuli.    Labs :  ANC 1.66, WBC 3.58, nl Hgb, Hct 46.2, MCHC 31.8 - stable   A1c wnl  Lipids WNL except low HDL -49      
Vitals - stable    MSE  On exam today, the patient is better groomed. She is cooperative with interview. She has better eye contact. Pt has nl rate of speech. No psychomotor agitation/retardation. She describes her mood as “fine”. Her affect is full in range,  more stable, appropriate. Her thought process : no apparent FTD. Despite patient's baseline mistrustful style, she is not reporting thoughts consistent with delusions. Denies AV/AH .  The patient denies S/H I/i/p. Cognition grossly oriented, attention  is fair. Her memory is intact. Her insight and judgment are impaired but improved. Her impulse control is improving but impaired by history.     Labs :  ANC 1.66, WBC 3.58, nl Hgb, Hct 46.2, MCHC 31.8 - stable   A1c wnl  Lipids WNL except low HDL -49      
Vitals - stable    MSE  On exam today, the patient is dishevelled but with fair hygiene. She is partially cooperative. She is mistrustful bit more verbal. Pt has nl rate of speech. No psychomotor agitation/retardation. She describes her mood as “fine”. Her affect is wider, yang in range, less irritable, appropriate. Her thought process : no apparent FTD. + delusional ideas and  paranoia. Not clear if Pt has AV/AH as she avoids answering questions re: perceptual abnormalities.  The patient denies S/H I/i/p. Cognition grossly oriented, attention poor. Her memory is intact. Her insight and judgment are poor. Her impulse control is poor. She is less sedated, easily arouse able  in response to verbal stimuli.    Labs :  ANC 1.66, WBC 3.58, nl Hgb, Hct 46.2, MCHC 31.8 - stable   A1c wnl  Lipids WNL except low HDL -49      
Vitals refused by pt.    MSE  On exam today, the patient is unkempt with poor hygiene. She appears older than stated age. She is very disheveled. In interview she is negativistic, even  hostile only marginally cooperative; she is poorly motivated. No psychomotor agitation/retardation. She describes her mood as “good”. Her affect is less intense/dysphoric than on admission but continues to be constricted, irritable, appropriate. Her thought process : no apparent FTD. Her thought content cannot be fully assessed but includes acknowledgement of past SI. Her perception cannot be fully assessed for AH or VH as pt is not fully cooperative (patient reports that she does not want to talk of present or past experiences of voices or paranoia) and pt declined answering questions regarding psychotic sx. The patient currently denies SI/HI. Cognition grossly oriented, attention poor. Her memory is intact. Her insight and judgment are poor. Her impulse control is poor. She is less sedated, easily arousable in response to verbal stimuli.    Labs today  ANC 1.66, WBC 3.58, nl Hgb, Hct 46.2, MCHC 31.8 - stable   A1c wnl  Lipids WNL except low HDL -49      
Vitals refused by pt.    MSE  On exam today, the patient is dishevelled  and still with poor eye contact. She is less hostile today. Pt has nl rate of speech. No psychomotor agitation/retardation. She describes her mood as “fine”. Her affect is less dysphoric , constricted, irritable, appropriate. Her thought process : no apparent FTD. + delusions of being a psychic and conviction that people are "playing games with her, paranoia. Denies  AH, VH. The patient does not engage in exploring possible suicidality/aggression, but she has not manifested such symptoms since admission. Cognition grossly oriented, attention poor. Her memory is intact. Her insight and judgment are poor. Her impulse control is poor. She is less sedated, easily arouse able  in response to verbal stimuli.    Labs :  ANC 1.66, WBC 3.58, nl Hgb, Hct 46.2, MCHC 31.8 - stable   A1c wnl  Lipids WNL except low HDL -49      
Vitals - stable    MSE  On exam today, the patient is mildly dishevelled. She is cooperative with interview. She has fair eye contact. Pt has nl rate of speech. No psychomotor agitation/retardation. She describes her mood as “good”. Her affect is full in range,  more stable, appropriate. Her thought process : no apparent FTD. Despite patient's baseline mistrustful style, she is not reporting thoughts consistent with delusions. Denies AV/AH .  The patient denies S/H I/i/p. Cognition grossly oriented, attention  is fair. Her memory is intact. Her insight and judgment are impaired but improved. Her impulse control  has improved but impaired by history.     Labs :  ANC 1.66, WBC 3.58, nl Hgb, Hct 46.2, MCHC 31.8 - stable   A1c wnl  Lipids WNL except low HDL -49      
Vitals refused by pt    MSE  On exam today, the patient is unkempt with poor hygiene. She appears older than stated age. She is less hostile but only marginally cooperative than prior week; pt is poorly motivated. No psychomotor agitation/retardation. She describes her mood as “fine”. Her affect is less intense/dysphoric than on admission but continues to be constricted, appropriate. Her thought process is goal-oriented, no apparent FTD. Her thought content cannot be fully assessed but includes acknowledgement of past SI. Her perception cannot be fully assessed for AH or VH as pt is not fully cooperative ( patient reports that she does not want to talk of present or past experiences of voices or paranoia) and pt declined answering questions regarding psychotic sx. The patient currently denies SI/HI. Cognition grossly oriented, attention poor. Her memory is intact. Her insight and judgment are poor. Her impulse control is poor. She is less sedated, easily arousable in response to verbal stimuli.      
Vitals - stable    MSE  On exam today, the patient is dishevelled but with better hygiene. She is only marginally cooperative. She is negativistic and secretive. Pt has nl rate of speech. No psychomotor agitation/retardation. She describes her mood as “fine”. Her affect is dysphoric , constricted, with irritable adge, appropriate. Her thought process : no apparent FTD. + delusions of being a psychic and conviction that people are "playing games with her, paranoia. Pt sayd that God tells her what to do.  The patient denies S/H I/i/p. Cognition grossly oriented, attention poor. Her memory is intact. Her insight and judgment are poor. Her impulse control is poor. She is less sedated, easily arouse able  in response to verbal stimuli.    Labs :  ANC 1.66, WBC 3.58, nl Hgb, Hct 46.2, MCHC 31.8 - stable   A1c wnl  Lipids WNL except low HDL -49      
    MSE  On exam today, the patient is unkempt with poor hygiene. She appears older than stated age. She is less hostile but only marginally cooperative; pt is poorly motivated. No psychomotor agitation/retardation. She describes her mood as “fine”. Her affect is less intense/dysphoric but is still constricted, appropriate, appropriate. Her thought process is goal-oriented , no apparent FTD. Her thought content cannot be fully assessed but includes acknowledgement of past SI. Her perception cannot be fully assessed for AH or VH as pt is not fully cooperative - she declined answering questions re: psychotic symptoms. The patient currently denies SI/HI. Cognition; grossly oriented: attention, - poor. Her memory is intact. Her insight and judgment are poor. Her impulse control is poor. She is sedated but easily arousable in response to verbal stimuli.    
  Pt refused vitals    MSE  On exam today, the patient is unkempt with poor hygiene. She appears older than stated age. She is less hostile but only marginally cooperative than prior week; pt is poorly motivated. No psychomotor agitation/retardation. She describes her mood as “good”. Her affect is less intense/dysphoric than on admission but continues to be constricted, appropriate. Her thought process is goal-oriented, no apparent FTD. Her thought content cannot be fully assessed but includes acknowledgement of past SI. Her perception cannot be fully assessed for AH or VH as pt is not fully cooperative and pt declined answering questions regarding psychotic sx. The patient currently denies SI/HI. Cognition grossly oriented, attention poor. Her memory is intact. Her insight and judgment are poor. Her impulse control is poor. She is sedated but easily arousable in response to verbal stimuli.    
Vitals - stable    MSE  On exam today, the patient is better groomed. Partially cooperative with interview, initially she is fairly well disposed but becomes antagonistic while discussing appropriate regimen of benzodiazepine - this appears to be driven by behavioral stance, not by psychosis. Pt has nl rate of speech. No psychomotor agitation/retardation. She describes her mood as “fine”. Her affect is full in range, episodically dysphoric, appropriate. Her thought process : no apparent FTD. + delusional ideas and  paranoia appear to decrease. Denies AV/AH .  The patient denies S/H I/i/p. Cognition grossly oriented, attention poor. Her memory is intact. Her insight and judgment are poor. Her impulse control is poor. She is less sedated, easily arouse able  in response to verbal stimuli.    Labs :  ANC 1.66, WBC 3.58, nl Hgb, Hct 46.2, MCHC 31.8 - stable   A1c wnl  Lipids WNL except low HDL -49

## 2021-05-24 NOTE — BH INPATIENT PSYCHIATRY PROGRESS NOTE - NSBHFUPINTERVALCCFT_PSY_A_CORE
Pt was seen for a follow up of Schizoaffective disorder
Patient was seen for a follow up of psychosis and suicidality
Patient was seen for a follow up of psychosis and suicidality
Pt was seen for a follow up of Schizoaffective disorder
25 yof presented to the ED with a CC of suicide attempt and psychosis and was admitted for suicidality.  Today patient was seen for a follow up.
Pt was seen for a follow up of Schizoaffective disorder
Patient seen for follow up for psychosis.
Patient seen for follow up for psychosis.
Pt was seen for a follow up of Schizoaffective disorder
Pt was seen for a follow up of Schizoaffective disorder and for discharge assessment

## 2021-05-24 NOTE — BH INPATIENT PSYCHIATRY PROGRESS NOTE - NSBHASSESSSUMMFT_PSY_ALL_CORE
This is 25 yof, self-reported pmhx of Schizoaffective disorder and homelessness, who presented to the ED with CC of [place] glass in [place]. Imaging negative for FB or acute process and thus pt was medically stable. Differential diagnosis includes schizoaffective disorder vs schizophrenia. Suicidality and psychosis at time of admission also signify pt is unable to care for self. 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 seen by ophthalmology, recommended antibiotices, positional/behavioral management, follow up 1-2 weeks .  Due to severe suicidality pt was on 1?1 CO since admission.    Pt remains only partially cooperative, but reports sleeping well and not feeling suicidal any more. She appears genuine in this statement. pt tolerated being off 1;1 CO well.  pt is very poorly motivated,- it's not clear if psychosis is responsible for her poor engagement in treatment. Negative symptoms are prominent. Pt is isolative and secretive. Less sedated.  Will encourage pt to cooperate with blood work for lipids and Hg A1C and CBC with diff  ( 5/7/21) -( neutropenia - on admission labs).     Legal: 9.39 emergency   Obs: regular status; Pt is not in need for 1:1 CO at this time. She is now consistently denying any current suicidal ideations/intents/plans.   Psychiatric: continue with lorazepam - 1 mg and 2 mg qhs, olanzapine 10 mg qhs, thiamine 100 mg qd with PRN haloperidol + lorazepam  Medical: continue with Augmentin 875/125 bid for 6 days for blowout fx; PRN pain meds; appreciate and follow ophthalmology recs, requesting f/up ophthalmilogy consult.  Psychological: will try to engage patient in group and milieu therapy- so far not successful at this  Dispo: housing interview was arranged today, but pt was not able to fully tolerate it.  
This is 25 yof, self-reported pmhx of Schizoaffective disorder and homelessness, who presented to the ED with CC of [place] glass in [place]. Imaging was  negative for FB or acute process and thus pt was medically stable. Differential diagnosis includes schizoaffective disorder vs schizophrenia. Suicidality and psychosis at time of admission also signified pt is unable to care for self. 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  Due to severe suicidality pt was on 1:1 CO upon admission.    Patient was restarted on Ativan 2 mg BID and Zyprexa 10 mg qhs. An attempt at increasing Zyprexa to 15 mg qhs was met with pt's resistance, so that dose was returned to 10 mg qhs.     Pt was very poorly motivated, paranoid, delusional, isolative and secretive, used sleep as maladaptive coping strategy. Nevertheless, after her meds were restarted she became more communicative. During the first few weeks patient displayed negativism, hostility and only limited interactions while having delusional thoughts. Over time her mood and affect improved she started to attend to her ADLs. She reported no longer being sad, denied any SI/I/P consistently. Preoccupations with paranoid delusional reality distortions decreased. 1:1 was stopped and patient has not been acting in self-harmful manner    While psychosis and affective symptoms have been improving behavioral problems became more apparent: tendency to self-isolate and at times manipulative style. They have been addressed by providing structure and establishing clear limits. Patient consistently declined participating in group therapy. She has not display a need for seclusions or restraints and has not been aggressive.    Medical: prior to her admission patient sustained orbital bones fructure; as per Ophthalmology recommendations she completed Augmentin 875/125 bid for 6 days ( for blowout fructure);  f/up ophthalmology consult 5/7/21 was not successful due to pt's refusal to cooperate with exam. Pt reports no complaints related to orbital fructure and declines Ophthalmology consult. She is advised to obtain Ophthalmology follow up on an OP basis.    Patient started to cooperate with post-discharge planning, including efforts to secure residential facility placement for her.  Blood work for lipids and Hg A1C and CBC with diff  ( 5/7/21) -( neutropenia stable, mild ).     Patient is overall doing better. Tolerates treatment well.     Patient responded to structure of the unit, medications, supportive interventions and is now consistently denying feeling overwhelmed, suicidal, or hopeless. She is in a good control and displays no active psychotic or manic symptoms. She is hopeful and excited of being discharged, she is not overwhelmed and reports improvement in sense of wellbeing. Patient is calm today, after sleeping well last night. She denies anhedonia. She denies any aggressive or homicidal i/i/p.  She is not overwhelmed or hopeless.   Denies s/h i/i/p. She is denying self- injurious urges. She has no motor slowing, no agitation, and no impairment of ADLs.   Assessment of suicidal and aggression risks was: Patient has significant chronic risks ( such as h/o  Schizoaffective disorder, suicide attempt, hospitalization ) but in view of described above significant improvement of symptoms, her willingness to adhere to treatment on an OP basis, close follow up by ACT team, availability of residential place, - in our assessment patient is not an acute suicidal/aggression risk at this time.   Patient was provided with extensive psychoeducation and with motivational counseling to encourage compliance and sobriety, on safe way of taking her medications. Patient was instructed on actions for crisis situations, understood and agreed to follow instructions for handling crisis: such as to come to ER or call 911 should patient feel that she is in danger of hurting self or others.  Patient was given Suicide Prevention Lifeline number 1-950-567-1872 and provided with instructions on its use. Safety plan was created for the patient.  Patient was advised to avoid driving until it is clear that her reactions are not impaired by medications.  Patient was also instructed to use contraception to avoid unplanned pregnancy while on medications.  Motivational counseling was provided.    Pt is to be discharged today
This is 25 yof, self-reported pmhx of Schizoaffective disorder and homelessness, who presented to the ED with CC of [place] glass in [place]. Imaging negative for FB or acute process and thus pt was medically stable. Differential diagnosis includes schizoaffective disorder vs schizophrenia. Suicidality and psychosis at time of admission also signify pt is unable to care for self. 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 seen by ophthalmology, recommended antibiotices, positional/behavioral management, follow up 1-2 weeks .  Due to severe suicidality pt was on 1:1 CO since admission.    Pt is very poorly motivated, paranoid, delusional. Pt is isolative and secretive, uses sleep as maladaptive coping strategy. Nevertheless, she is more communicative now.  Blood work for lipids and Hg A1C and CBC with diff  ( 5/7/21) -( neutropenia stable, mild ).      Pt is not in need for 1:1 CO at this time. She is now consistently denying any current suicidal ideations/intents/plans.   Psychiatric: continue with Ativan - 1 mg and 2 mg qhs, continue Zyprexa to 10 mg qhs, thiamine 100 mg qd with PRN haloperidol + lorazepam  Medical: completed Augmentin 875/125 bid for 6 days for blowout fx; PRN pain meds; appreciate and follow ophthalmology recs, requesting f/up ophthalmology consult 5/7/21 was not successful due to pt's refusal to cooperate with exam.  Psychological: will continue efforts to improve therapeutic alliance - so far not successful at this  Dispo: housing interview was arranged , but pt was not able to fully tolerate it. Will explore dispo options as per [facility].  
Patient was irritable and minimally engaged in interview.     c/w current medications and CO for safety.
This is 25 yof, self-reported pmhx of Schizoaffective disorder and homelessness, who presented to the ED with CC of [place] glass in [place]. Imaging negative for FB or acute process and thus pt was medically stable. Differential diagnosis includes schizoaffective disorder vs schizophrenia. Suicidality and psychosis at time of admission also signify pt is unable to care for self. 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 seen by ophthalmology, recommended antibiotices, positional/behavioral management, follow up 1-2 weeks .  Due to severe suicidality pt was on 1:1 CO since admission.    Pt is very poorly motivated, paranoid, delusional. Pt is isolative and secretive. c/o excessive sedation and insists on lowering Zyprexa dose. pt intentionally uses sleep as maladaptive coping strategy.  Blood work for lipids and Hg A1C and CBC with diff  ( 5/7/21) -( neutropenia stable, mild ).     Legal: Pt was admitted on 9.39 which is expiring. She can not be safely discharged at this time. Her legal status is being converted to in-voluntary to continue in-patient treatment.   Pt is not in need for 1:1 CO at this time. She is now consistently denying any current suicidal ideations/intents/plans.   Psychiatric: continue with Ativan - 1 mg and 2 mg qhs, continue Zyprexa to 10 mg qhs, thiamine 100 mg qd with PRN haloperidol + lorazepam  Medical: completed Augmentin 875/125 bid for 6 days for blowout fx; PRN pain meds; appreciate and follow ophthalmology recs, requesting f/up ophthalmology consult 5/7/21 was not successful due to pt's refusal to cooperate with exam.  Psychological: will continue efforts to improve therapeutic alliance - so far not successful at this  Dispo: housing interview was arranged , but pt was not able to fully tolerate it. Will explore dispo options as per SW.  
This is 25 yof, self-reported pmhx of Schizoaffective disorder and homelessness, who presented to the ED with CC of [place] glass in [place]. Imaging negative for FB or acute process and thus pt was medically stable. Differential diagnosis includes schizoaffective disorder vs schizophrenia. Suicidality and psychosis at time of admission also signify pt is unable to care for self. 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 seen by ophthalmology, recommended antibiotices, positional/behavioral management, follow up 1-2 weeks .  Due to severe suicidality pt was on 1:1 CO since admission.    Pt is very poorly motivated, paranoid, delusional. Pt is isolative and secretive, uses sleep as maladaptive coping strategy. Nevertheless, she is more communicative now.  Blood work for lipids and Hg A1C and CBC with diff  ( 5/7/21) -( neutropenia stable, mild ).      Pt is not in need for 1:1 CO at this time. She is now consistently denying any current suicidal ideations/intents/plans.   Psychiatric: continue with Ativan - 1 mg and 2 mg qhs, continue Zyprexa to 10 mg qhs, d/c thiamine 100 mg qd .  Medical: completed Augmentin 875/125 bid for 6 days for blowout fx; PRN pain meds; appreciate and follow ophthalmology recs, requesting f/up ophthalmology consult 5/7/21 was not successful due to pt's refusal to cooperate with exam. Pt reports no complaints related to orbital fructure and declines Ophthalmology consult.  Psychological: will continue efforts to improve therapeutic alliance.  Dispo: housing interview was arranged , but pt was not able to fully tolerate it. Exploring dispo options as per [facility].  
This is 25 yof, self-reported pmhx of Schizoaffective disorder and homelessness, who presented to the ED with CC of [place] glass in [place]. Imaging negative for FB or acute process and thus pt was medically stable. Differential diagnosis includes schizoaffective disorder vs schizophrenia. Suicidality and psychosis at time of admission also signify pt is unable to care for self. 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 seen by ophthalmology, recommended antibiotices, positional/behavioral management, follow up 1-2 weeks .  Due to severe suicidality pt was on 1:1 CO since admission.    Pt remains only marginally cooperative, but reports sleeping well.  pt is very poorly motivated,- it's not clear if psychosis is responsible for her poor engagement in treatment. Negative symptoms are prominent as well as negativistic attitude. Pt is isolative and secretive. Mildly sedated, but it also appear that patient intentionally uses sleep as maladaptive coping strategy.  Blood work for lipids and Hg A1C and CBC with diff  ( 5/7/21) -( neutropenia stable, mild ).     Legal: 9.39 emergency   Obs: regular status; Pt is not in need for 1:1 CO at this time. She is now consistently denying any current suicidal ideations/intents/plans.   Psychiatric: continue with lorazepam - 1 mg and 2 mg qhs, olanzapine increased to 15 mg qhs, thiamine 100 mg qd with PRN haloperidol + lorazepam  Medical: continue with Augmentin 875/125 bid for 6 days for blowout fx; PRN pain meds; appreciate and follow ophthalmology recs, requesting f/up ophthalmology consult 5/7/21 was not successful due to pt's refusal to cooperate with exam..  Psychological: will try to engage patient in group and milieu therapy- so far not successful at this  Dispo: housing interview was arranged , but pt was not able to fully tolerate it.  
This is 25 yof, self-reported pmhx of Schizoaffective disorder and homelessness, who presented to the ED with CC of [place] glass in [place]. Imaging was  negative for FB or acute process and thus pt was medically stable. Differential diagnosis includes schizoaffective disorder vs schizophrenia. Suicidality and psychosis at time of admission also signified pt is unable to care for self. 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 seen by ophthalmology, recommended antibiotics, positional/behavioral management, follow up 1-2 weeks .  Due to severe suicidality pt was on 1:1 CO upon admission.    Patient was restarted on Ativan 2 mg BID and Zyprexa 10 mg qhs. An attempt at increasing Zyprexa to 15 mg qhs was met with pt's resistance, so that dose was returned to 10 mg qhs.     Pt was very poorly motivated, paranoid, delusional, isolative and secretive, used sleep as maladaptive coping strategy. Nevertheless, after her meds were restarted she became more communicative. During the first few weeks patient displayed negativism, hostility and only limited interactions while having delusional thoughts. Over time her mood and affect improved she started to attend to her ADLs. She reported no longer being sad, denied any SI/I/P consistently. Preoccupations with paranoid delusional reality distortions decreased. 1:1 was stopped and patient has not been acting in self-harmful manner    While psychosis and affective symptoms have been improving behavioral problems became more apparent: tendency to self-isolate and at times manipulative style. They have been addressed by providing structure and establishing clear limits. Patient consistently declined participating in group therapy. She has not display a need for seclusions or restraints and has not been aggressive.    Medical: prior to her admission patient sustained orbital bones fructure; as per Ophthalmology recommendations she completed Augmentin 875/125 bid for 6 days ( for blowout fructure);  f/up ophthalmology consult 5/7/21 was not successful due to pt's refusal to cooperate with exam. Pt reports no complaints related to orbital fructure and declines Ophthalmology consult. She is advised to obtain Ophthalmology follow up on an OP basis.    Patient started to cooperate with post-discharge planning, including efforts to secure residential facility placement for her.  Blood work for lipids and Hg A1C and CBC with diff  ( 5/7/21) -( neutropenia stable, mild ).     Patient is overall doing better. Tolerates treatment well.    Pt is not in need for 1:1 CO at this time.   Psychiatric: continue with Ativan - 2 mg and 2 mg qhs, with only 1 Ativan prn in 24 hors. continue Zyprexa to 10 mg qhs ( patient refused dose increase).    Psychological: will continue efforts to improve therapeutic alliance, provide structure, while working on dispo.  Dispo: housing - as per [facility].  
This is 25 yof, self-reported pmhx of Schizoaffective disorder and homelessness, who presented to the ED with CC of [place] glass in [place]. Imaging negative for FB or acute process and thus pt was medically stable. Differential diagnosis includes schizoaffective disorder vs schizophrenia. Suicidality and psychosis at time of admission also signify pt is unable to care for self. 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 seen by ophthalmology, recommended antibiotices, positional/behavioral management, follow up 1-2 weeks .  Due to severe suicidality pt was on 1?1 CO since admission.    Pt remains only partially cooperative, but reports sleeping well and not feeling suicidal any more. She appears genuine in this statement. pt tolerated being off 1;1 CO well.  pt is very poorly motivated,- it's not clear if psychosis is responsible for her poor engagement in treatment. Negative symptoms are prominent. Pt is isolative and secretive. Less sedated.    Legal: 9.39 emergency   Obs: regular status; Pt is not in need for 1:1 CO at this time. She is now consistently denying any current suicidal ideations/intents/plans.   Psychiatric: continue with lorazepam - 1 mg and 2 mg qhs, olanzapine 10 mg qhs, thiamine 100 mg qd with PRN haloperidol + lorazepam  Medical: continue with Augmentin 875/125 bid for 6 days for blowout fx; PRN pain meds; appreciate and follow ophthalmology recs, requesting f/up ophthalmilogy consult.  Psychological: will try to engage patient in group and milieu therapy  Dispo: pending further symptom and medication optimization  
This is 25 yof, self-reported pmhx of Schizoaffective disorder and homelessness, who presented to the ED with CC of swallowing glass in [place]. Imaging negative for FB or acute process and thus pt was medically stable. Differential diagnosis includes schizoaffective disorder vs schizophrenia. Suicidality and psychosis at time of admission also signify pt is unable to care for self. 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 seen by ophthalmology, recommended antibiotices, positional/behavioral management, follow up 1-2 weeks .  Due to severe suicidality pt was on 1:1 CO since admission.    Pt is very poorly motivated, paranoid, delusional. Pt is isolative and secretive. c/o excessive sedation and insists on lowering Zyprexa dose. pt intentionally uses sleep as maladaptive coping strategy.  Blood work for lipids and Hg A1C and CBC with diff  ( 5/7/21) -( neutropenia stable, mild ).     Legal: 9.39 emergency    Pt is not in need for 1:1 CO at this time. She is now consistently denying any current suicidal ideations/intents/plans.   Psychiatric: continue with Ativan - 1 mg and 2 mg qhs, continue Zyprexa to 10 mg qhs, thiamine 100 mg qd with PRN haloperidol + lorazepam  Medical: completed Augmentin 875/125 bid for 6 days for blowout fx; PRN pain meds; appreciate and follow ophthalmology recs, requesting f/up ophthalmology consult 5/7/21 was not successful due to pt's refusal to cooperate with exam.  Psychological: will continue efforts to improve therapeutic alliance - so far not successful at this  Dispo: housing interview was arranged , but pt was not able to fully tolerate it. Will explore dispo options as per SW.  
This is 25 yof, self-reported pmhx of Schizoaffective disorder and homelessness, who presented to the ED with CC of swallowing glass in [place]. Imaging negative for FB or acute process and thus pt was medically stable. Differential diagnosis includes schizoaffective disorder vs schizophrenia. Suicidality and psychosis at time of admission also signify pt is unable to care for self. 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 seen by ophthalmology, recommended antibiotices, positional/behavioral management, follow up 1-2 weeks .  Due to severe suicidality pt was on 1:1 CO since admission.    Pt is very poorly motivated, paranoid, delusional. Pt is isolative and secretive, uses sleep as maladaptive coping strategy. Nevertheless, she is more communicative now.  Blood work for lipids and Hg A1C and CBC with diff  ( 5/7/21) -( neutropenia stable, mild ).   Patient is better in attention to ADLS; not disruptive. denies depression and suicidality.     Pt is not in need for 1:1 CO at this time.   Psychiatric: continue with Ativan - 1 mg and 2 mg qhs, continue Zyprexa to 10 mg qhs, d/c thiamine 100 mg qd .  Medical: completed Augmentin 875/125 bid for 6 days for blowout fx; PRN pain meds; appreciate and follow ophthalmology recs, requesting f/up ophthalmology consult 5/7/21 was not successful due to pt's refusal to cooperate with exam. Pt reports no complaints related to orbital fructure and declines Ophthalmology consult.  Psychological: will continue efforts to improve therapeutic alliance.  Dispo: housing - Exploring dispo options as per [facility].  
This is 25 yof, self-reported pmhx of Schizoaffective disorder and homelessness, who presented to the ED with CC of swallowing glass in [place]. Imaging negative for FB or acute process and thus pt was medically stable. Differential diagnosis includes schizoaffective disorder vs schizophrenia. Suicidality and psychosis at time of admission also signify pt is unable to care for self. 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 seen by ophthalmology, recommended antibiotices, positional/behavioral management, follow up 1-2 weeks .  Due to severe suicidality pt was on 1?1 CO since admission.    Pt remains only partially cooperative, but reports sleeping well and not feeling suicidal any more. She appears genuine in this statement.    Legal: 9.39 emergency   Obs: Upon careful consideration and discussion with team, we concluded that the patient is not in need for 1:1 CO at this time. She is now consistently denying any current suicidal ideations/intents/plans.   Psychiatric: continue with lorazepam 2 mg bid, olanzapine 10 mg qhs, thiamine 100 mg qd with PRN haloperidol + lorazepam  Medical: continue with Augmentin 875/125 bid for 6 days for blowout fx; PRN pain meds; appreciate and follow ophthalmology recs  Psychological: will try to engage patient in group and milieu therapy  Dispo: pending further symptom and medication optimization  
This is 25 yof, self-reported pmhx of Schizoaffective disorder and homelessness, who presented to the ED with CC of [place] glass in [place]. Imaging negative for FB or acute process and thus pt was medically stable. Differential diagnosis includes schizoaffective disorder vs schizophrenia. Suicidality and psychosis at time of admission also signify pt is unable to care for self. 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 seen by ophthalmology, recommended antibiotices, positional/behavioral management, follow up 1-2 weeks .  Due to severe suicidality pt was on 1:1 CO since admission.    Pt is very poorly motivated, paranoid, delusional. Pt is isolative and secretive, uses sleep as maladaptive coping strategy. Nevertheless, she is more communicative now.  Blood work for lipids and Hg A1C and CBC with diff  ( 5/7/21) -( neutropenia stable, mild ).   Patient is better in attention to ADLS; not disruptive. denies depression and suicidality.  While psychosis and affective symptoms are improving behavioral problems becomes more apparent, expressed in manipulative style. They are being addressed by providing structure and establishing clear limits.     Pt is not in need for 1:1 CO at this time.   Psychiatric: continue with Ativan - 2 mg and 2 mg qhs, with only 1 Ativan prn in 24 hors. continue Zyprexa to 10 mg qhs ( patient refused dose increase).  Medical: completed Augmentin 875/125 bid for 6 days for blowout fx;  f/up ophthalmology consult 5/7/21 was not successful due to pt's refusal to cooperate with exam. Pt reports no complaints related to orbital fructure and declines Ophthalmology consult.  Psychological: will continue efforts to improve therapeutic alliance, provide structure, while working on dispo.  Dispo: housing - as per SW.  
This is 25 yof, self-reported pmhx of Schizoaffective disorder and homelessness, who presented to the ED with CC of swallowing glass in [place]. Imaging negative for FB or acute process and thus pt was medically stable. Differential diagnosis includes schizoaffective disorder vs schizophrenia. Suicidality and psychosis at time of admission also signify pt is unable to care for self. 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 seen by ophthalmology, recommended antibiotices, positional/behavioral management, follow up 1-2 weeks .  Due to severe suicidality pt was on 1:1 CO since admission.    Pt is very poorly motivated, paranoid, delusional. Pt is isolative and secretive. c/o excessive sedation and insists on lowering Zyprexa dose. pt intentionally uses sleep as maladaptive coping strategy.  Blood work for lipids and Hg A1C and CBC with diff  ( 5/7/21) -( neutropenia stable, mild ).     Legal: 9.39 emergency   Obs: regular status; Pt is not in need for 1:1 CO at this time. She is now consistently denying any current suicidal ideations/intents/plans.   Psychiatric: continue with Ativan - 1 mg and 2 mg qhs, decrease Zyprexa to 10 mg qhs, thiamine 100 mg qd with PRN haloperidol + lorazepam  Medical: completed Augmentin 875/125 bid for 6 days for blowout fx; PRN pain meds; appreciate and follow ophthalmology recs, requesting f/up ophthalmology consult 5/7/21 was not successful due to pt's refusal to cooperate with exam.  Psychological: will continue efforts to improve therapeutic alliance - so far not successful at this  Dispo: housing interview was arranged , but pt was not able to fully tolerate it.  
This is 25 yof, self-reported pmhx of Schizoaffective disorder and homelessness, who presented to the ED with CC of swallowing glass in [place]. Imaging negative for FB or acute process and thus pt was medically stable. Differential diagnosis includes schizoaffective disorder vs schizophrenia. Suicidality and psychosis at time of admission also signify pt is unable to care for self. 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 seen by ophthalmology, recommended antibiotices, positional/behavioral management, follow up 1-2 weeks .  Due to severe suicidality pt was on 1?1 CO since admission.    Pt remains only partially cooperative, but reports sleeping well and not feeling suicidal any more. She appears genuine in this statement. pt tolerated being off 1;1 CO well.  pt is very poorly motivated,- it's not clear if psychosis is responsible for her poor engagement in treatment.    Legal: 9.39 emergency   Obs: regular status; Pt is not in need for 1:1 CO at this time. She is now consistently denying any current suicidal ideations/intents/plans.   Psychiatric: continue with lorazepam - will attempt to decrease am dose to 1 mg and 2 mg qhs, olanzapine 10 mg qhs, thiamine 100 mg qd with PRN haloperidol + lorazepam  Medical: continue with Augmentin 875/125 bid for 6 days for blowout fx; PRN pain meds; appreciate and follow ophthalmology recs  Psychological: will try to engage patient in group and milieu therapy  Dispo: pending further symptom and medication optimization  
This is 25 yof, self-reported pmhx of Schizoaffective disorder and homelessness, who presented to the ED with CC of swallowing glass in [place]. Imaging negative for FB or acute process and thus pt was medically stable. Differential diagnosis includes schizoaffective disorder vs schizophrenia. Suicidality and psychosis at time of admission also signify pt is unable to care for self. 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 seen by ophthalmology, recommended antibiotices, positional/behavioral management, follow up 1-2 weeks .  Due to severe suicidality pt was on 1:1 CO since admission.    Pt is very poorly motivated, paranoid, delusional. Pt is isolative and secretive. c/o excessive sedation and insists on lowering Zyprexa dose. pt intentionally uses sleep as maladaptive coping strategy.  Blood work for lipids and Hg A1C and CBC with diff  ( 5/7/21) -( neutropenia stable, mild ).     Legal: 9.39 emergency   Obs: regular status; Pt is not in need for 1:1 CO at this time. She is now consistently denying any current suicidal ideations/intents/plans.   Psychiatric: continue with Ativan - 1 mg and 2 mg qhs, continue Zyprexa to 10 mg qhs, thiamine 100 mg qd with PRN haloperidol + lorazepam  Medical: completed Augmentin 875/125 bid for 6 days for blowout fx; PRN pain meds; appreciate and follow ophthalmology recs, requesting f/up ophthalmology consult 5/7/21 was not successful due to pt's refusal to cooperate with exam.  Psychological: will continue efforts to improve therapeutic alliance - so far not successful at this  Dispo: housing interview was arranged , but pt was not able to fully tolerate it. Will explore dispo options as per SW.  
This is 25 yof, self-reported pmhx of Schizoaffective disorder and homelessness, who presented to the ED with CC of swallowing glass in [place]. Imaging negative for FB or acute process and thus pt was medically stable. Differential diagnosis includes schizoaffective disorder vs schizophrenia. Suicidality and psychosis at time of admission also signify pt is unable to care for self. 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 seen by ophthalmology, recommended antibiotices, positional/behavioral management, follow up 1-2 weeks .  Due to severe suicidality pt was on 1:1 CO since admission.    Pt remains only partially cooperative, but reports sleeping well and not feeling suicidal any more. She appears genuine in this statement. pt tolerated being off 1;1 CO well.  pt is very poorly motivated,- it's not clear if psychosis is responsible for her poor engagement in treatment. Negative symptoms are prominent as well as negativistic attitude. Pt is isolative and secretive. Less sedated.  blood work for lipids and Hg A1C and CBC with diff  ( 5/7/21) -( neutropenia stable, mild ).     Legal: 9.39 emergency   Obs: regular status; Pt is not in need for 1:1 CO at this time. She is now consistently denying any current suicidal ideations/intents/plans.   Psychiatric: continue with lorazepam - 1 mg and 2 mg qhs, olanzapine increase to 15 mg qhs, thiamine 100 mg qd with PRN haloperidol + lorazepam  Medical: continue with Augmentin 875/125 bid for 6 days for blowout fx; PRN pain meds; appreciate and follow ophthalmology recs, requesting f/up ophthalmilogy consult 5/7/21.  Psychological: will try to engage patient in group and milieu therapy- so far not successful at this  Dispo: housing interview was arranged , but pt was not able to fully tolerate it.  
Patient was very irritable and refused to engage in interview.

## 2021-05-24 NOTE — BH INPATIENT PSYCHIATRY PROGRESS NOTE - NSCGIIMPROVESX_PSY_ALL_CORE

## 2021-05-24 NOTE — BH INPATIENT PSYCHIATRY PROGRESS NOTE - NSBHCHARTREVIEWVS_PSY_A_CORE FT
Vital Signs Last 24 Hrs  T(C): --  T(F): --  HR: --  BP: --  BP(mean): --  RR: --  SpO2: --
Vital Signs Last 24 Hrs  T(C): 36.6 (03 May 2021 17:17), Max: 36.6 (03 May 2021 17:17)  T(F): 97.9 (03 May 2021 17:17), Max: 97.9 (03 May 2021 17:17)  HR: --  BP: --  BP(mean): --  RR: --  SpO2: --
Vital Signs Last 24 Hrs  T(C): 36.1 (10 May 2021 17:35), Max: 36.1 (10 May 2021 17:35)  T(F): 97 (10 May 2021 17:35), Max: 97 (10 May 2021 17:35)  HR: --  BP: --  BP(mean): --  RR: --  SpO2: --
Vital Signs Last 24 Hrs  T(C): 37.2 (05 May 2021 17:51), Max: 37.2 (05 May 2021 17:51)  T(F): 98.9 (05 May 2021 17:51), Max: 98.9 (05 May 2021 17:51)  HR: 97 (05 May 2021 15:45) (97 - 97)  BP: 115/70 (05 May 2021 15:45) (115/70 - 115/70)  BP(mean): --  RR: --  SpO2: --
Vital Signs Last 24 Hrs  T(C): --  T(F): --  HR: --  BP: --  BP(mean): --  RR: --  SpO2: --
Vital Signs Last 24 Hrs  T(C): 36.7 (13 May 2021 06:04), Max: 36.7 (13 May 2021 06:04)  T(F): 98 (13 May 2021 06:04), Max: 98 (13 May 2021 06:04)  HR: 101 (13 May 2021 06:04) (101 - 101)  BP: 128/82 (13 May 2021 06:04) (128/82 - 128/82)  BP(mean): --  RR: --  SpO2: --
Vital Signs Last 24 Hrs  T(C): --  T(F): --  HR: --  BP: --  BP(mean): --  RR: --  SpO2: --
Vital Signs Last 24 Hrs  T(C): 36.6 (14 May 2021 07:59), Max: 36.6 (14 May 2021 07:59)  T(F): 97.9 (14 May 2021 07:59), Max: 97.9 (14 May 2021 07:59)  HR: 108 (14 May 2021 07:59) (108 - 108)  BP: 105/61 (14 May 2021 07:59) (105/61 - 105/61)  BP(mean): --  RR: --  SpO2: --
Vital Signs Last 24 Hrs  T(C): 37 (30 Apr 2021 20:25), Max: 37.1 (30 Apr 2021 15:01)  T(F): 98.6 (30 Apr 2021 20:25), Max: 98.8 (30 Apr 2021 15:01)  HR: --  BP: --  BP(mean): --  RR: --  SpO2: --
Vital Signs Last 24 Hrs  T(C): --  T(F): --  HR: --  BP: --  BP(mean): --  RR: --  SpO2: --
Vital Signs Last 24 Hrs  T(C): 37.1 (04 May 2021 17:28), Max: 37.1 (04 May 2021 17:28)  T(F): 98.8 (04 May 2021 17:28), Max: 98.8 (04 May 2021 17:28)  HR: --  BP: --  BP(mean): --  RR: --  SpO2: --
Vital Signs Last 24 Hrs  T(C): 37.1 (20 May 2021 17:18), Max: 37.1 (20 May 2021 17:18)  T(F): 98.7 (20 May 2021 17:18), Max: 98.7 (20 May 2021 17:18)  HR: --  BP: --  BP(mean): --  RR: --  SpO2: --
Vital Signs Last 24 Hrs  T(C): --  T(F): --  HR: --  BP: --  BP(mean): --  RR: --  SpO2: --
Vital Signs Last 24 Hrs  T(C): 35.9 (02 May 2021 17:28), Max: 35.9 (02 May 2021 17:28)  T(F): 96.7 (02 May 2021 17:28), Max: 96.7 (02 May 2021 17:28)  HR: --  BP: --  BP(mean): --  RR: --  SpO2: --
Vital Signs Last 24 Hrs  T(C): 36.5 (23 May 2021 17:58), Max: 36.5 (23 May 2021 17:58)  T(F): 97.7 (23 May 2021 17:58), Max: 97.7 (23 May 2021 17:58)  HR: --  BP: --  BP(mean): --  RR: --  SpO2: --
Vital Signs Last 24 Hrs  T(C): --  T(F): --  HR: --  BP: --  BP(mean): --  RR: --  SpO2: --

## 2021-05-24 NOTE — BH INPATIENT PSYCHIATRY PROGRESS NOTE - NSBHMETABOLIC_PSY_ALL_CORE_FT
BMI:   HbA1c: A1C with Estimated Average Glucose Result: 4.8 % (05-07-21 @ 10:10)    Glucose:   BP: --  Lipid Panel: Date/Time: 05-07-21 @ 10:10  Cholesterol, Serum: 158  Direct LDL: --  HDL Cholesterol, Serum: 49  Total Cholesterol/HDL Ration Measurement: --  Triglycerides, Serum: 100  
BMI:   HbA1c: A1C with Estimated Average Glucose Result: 4.8 % (05-07-21 @ 10:10)    Glucose:   BP: 128/82 (05-13-21 @ 06:04) (128/82 - 128/82)  Lipid Panel: Date/Time: 05-07-21 @ 10:10  Cholesterol, Serum: 158  Direct LDL: --  HDL Cholesterol, Serum: 49  Total Cholesterol/HDL Ration Measurement: --  Triglycerides, Serum: 100  
BMI:   HbA1c: A1C with Estimated Average Glucose Result: 4.8 % (05-07-21 @ 10:10)    Glucose:   BP: 105/61 (05-14-21 @ 07:59) (105/61 - 128/82)  Lipid Panel: Date/Time: 05-07-21 @ 10:10  Cholesterol, Serum: 158  Direct LDL: --  HDL Cholesterol, Serum: 49  Total Cholesterol/HDL Ration Measurement: --  Triglycerides, Serum: 100  
BMI:   HbA1c: A1C with Estimated Average Glucose Result: 4.8 % (05-07-21 @ 10:10)    Glucose:   BP: --  Lipid Panel: Date/Time: 05-07-21 @ 10:10  Cholesterol, Serum: 158  Direct LDL: --  HDL Cholesterol, Serum: 49  Total Cholesterol/HDL Ration Measurement: --  Triglycerides, Serum: 100  
BMI:   HbA1c: A1C with Estimated Average Glucose Result: 4.8 % (05-07-21 @ 10:10)    Glucose:   BP: --  Lipid Panel: Date/Time: 05-07-21 @ 10:10  Cholesterol, Serum: 158  Direct LDL: --  HDL Cholesterol, Serum: 49  Total Cholesterol/HDL Ration Measurement: --  Triglycerides, Serum: 100  
BMI:   HbA1c:   Glucose:   BP: --  Lipid Panel: 
BMI:   HbA1c:   Glucose:   BP: 141/101 (04-30-21 @ 10:18) (120/87 - 141/101)  Lipid Panel: 
BMI:   HbA1c:   Glucose:   BP: 115/70 (05-05-21 @ 15:45) (115/70 - 115/70)  Lipid Panel: 
BMI:   HbA1c:   Glucose:   BP: 141/101 (04-30-21 @ 10:18) (120/87 - 141/101)  Lipid Panel: 
BMI:   HbA1c: A1C with Estimated Average Glucose Result: 4.8 % (05-07-21 @ 10:10)    Glucose:   BP: 115/70 (05-05-21 @ 15:45) (115/70 - 115/70)  Lipid Panel: Date/Time: 05-07-21 @ 10:10  Cholesterol, Serum: 158  Direct LDL: --  HDL Cholesterol, Serum: 49  Total Cholesterol/HDL Ration Measurement: --  Triglycerides, Serum: 100  
BMI:   HbA1c: A1C with Estimated Average Glucose Result: 4.8 % (05-07-21 @ 10:10)    Glucose:   BP: --  Lipid Panel: Date/Time: 05-07-21 @ 10:10  Cholesterol, Serum: 158  Direct LDL: --  HDL Cholesterol, Serum: 49  Total Cholesterol/HDL Ration Measurement: --  Triglycerides, Serum: 100  
BMI:   HbA1c:   Glucose:   BP: --  Lipid Panel: 
BMI:   HbA1c:   Glucose:   BP: --  Lipid Panel: 
BMI:   HbA1c: A1C with Estimated Average Glucose Result: 4.8 % (05-07-21 @ 10:10)    Glucose:   BP: --  Lipid Panel: Date/Time: 05-07-21 @ 10:10  Cholesterol, Serum: 158  Direct LDL: --  HDL Cholesterol, Serum: 49  Total Cholesterol/HDL Ration Measurement: --  Triglycerides, Serum: 100  
BMI:   HbA1c: A1C with Estimated Average Glucose Result: 4.8 % (05-07-21 @ 10:10)    Glucose:   BP: --  Lipid Panel: Date/Time: 05-07-21 @ 10:10  Cholesterol, Serum: 158  Direct LDL: --  HDL Cholesterol, Serum: 49  Total Cholesterol/HDL Ration Measurement: --  Triglycerides, Serum: 100

## 2021-07-20 ENCOUNTER — APPOINTMENT (OUTPATIENT)
Dept: OPHTHALMOLOGY | Facility: CLINIC | Age: 26
End: 2021-07-20

## 2021-08-31 NOTE — BH INPATIENT PSYCHIATRY PROGRESS NOTE - NSBHINPTBILLING_PSY_ALL_CORE
Administered Vial test 0.02ml subq of vial 1 in [facility]. Local reaction 11mm.  [id number]
05239 - Inpatient Low Complexity
51209 - Inpatient Low Complexity
22630 - Inpatient Low Complexity
95630 - Inpatient Low Complexity
06156 - Inpatient Low Complexity
59699 - Inpatient Moderate Complexity
70190 - Inpatient Moderate Complexity
89902 - Inpatient Low Complexity
10590 - Inpatient Moderate Complexity
67176 - Inpatient Low Complexity
61495 - Inpatient Low Complexity
06616 - Inpatient Low Complexity
48488 - Inpatient Moderate Complexity
66799 - Inpatient Moderate Complexity
97574 - Inpatient Low Complexity
87103 - Inpatient Low Complexity
17171 - Inpatient Moderate Complexity
17014 - Inpatient Moderate Complexity

## 2021-11-28 ENCOUNTER — EMERGENCY (EMERGENCY)
Facility: HOSPITAL | Age: 26
LOS: 1 days | End: 2021-11-28
Admitting: EMERGENCY MEDICINE
Payer: MEDICARE

## 2021-11-28 PROCEDURE — 99215 OFFICE O/P EST HI 40 MIN: CPT | Mod: 95

## 2021-11-28 PROCEDURE — 93010 ELECTROCARDIOGRAM REPORT: CPT

## 2021-11-28 PROCEDURE — 99285 EMERGENCY DEPT VISIT HI MDM: CPT

## 2021-11-28 PROCEDURE — 90792 PSYCH DIAG EVAL W/MED SRVCS: CPT | Mod: 95

## 2021-11-29 ENCOUNTER — INPATIENT (INPATIENT)
Facility: HOSPITAL | Age: 26
LOS: 34 days | Discharge: HOME | DRG: 885 | End: 2022-01-03
Attending: PSYCHIATRY & NEUROLOGY | Admitting: PSYCHIATRY & NEUROLOGY
Payer: MEDICARE

## 2021-11-29 VITALS
RESPIRATION RATE: 17 BRPM | HEART RATE: 100 BPM | OXYGEN SATURATION: 96 % | TEMPERATURE: 98 F | SYSTOLIC BLOOD PRESSURE: 127 MMHG | HEIGHT: 66 IN | WEIGHT: 279.55 LBS | DIASTOLIC BLOOD PRESSURE: 86 MMHG

## 2021-11-29 RX ORDER — DIPHENHYDRAMINE HCL 50 MG
25 CAPSULE ORAL EVERY 6 HOURS
Refills: 0 | Status: DISCONTINUED | OUTPATIENT
Start: 2021-11-29 | End: 2022-01-03

## 2021-11-29 RX ORDER — ACETAMINOPHEN 500 MG
650 TABLET ORAL ONCE
Refills: 0 | Status: COMPLETED | OUTPATIENT
Start: 2021-11-29 | End: 2021-12-10

## 2021-11-29 RX ORDER — OLANZAPINE 15 MG/1
5 TABLET, FILM COATED ORAL AT BEDTIME
Refills: 0 | Status: DISCONTINUED | OUTPATIENT
Start: 2021-11-29 | End: 2021-11-30

## 2021-11-29 RX ORDER — OLANZAPINE 15 MG/1
5 TABLET, FILM COATED ORAL EVERY 12 HOURS
Refills: 0 | Status: DISCONTINUED | OUTPATIENT
Start: 2021-11-29 | End: 2022-01-03

## 2021-11-29 NOTE — BH PATIENT PROFILE - HOME MEDICATIONS
LORazepam 2 mg oral tablet , 1 tab(s) orally 2 times a day MDD:4 mg/day  OLANZapine 10 mg oral tablet , 1 tab(s) orally once a day x 7 days  at 6pm for psychosis

## 2021-11-29 NOTE — BH PATIENT PROFILE - PATIENT REPRESENTATIVE: ( YOU CAN CHOOSE ANY PERSON THAT CAN ASSIST YOU WITH YOUR HEALTH CARE PREFERENCES, DOES NOT HAVE TO BE A SPOUSE, IMMEDIATE FAMILY OR SIGNIFICANT OTHER/PARTNER)
Well Visit, Men 48 to 72: Care Instructions Your Care Instructions Physical exams can help you stay healthy. Your doctor has checked your overall health and may have suggested ways to take good care of yourself. He or she also may have recommended tests. At home, you can help prevent illness with healthy eating, regular exercise, and other steps. Follow-up care is a key part of your treatment and safety. Be sure to make and go to all appointments, and call your doctor if you are having problems. It's also a good idea to know your test results and keep a list of the medicines you take. How can you care for yourself at home? · Reach and stay at a healthy weight. This will lower your risk for many problems, such as obesity, diabetes, heart disease, and high blood pressure. · Get at least 30 minutes of exercise on most days of the week. Walking is a good choice. You also may want to do other activities, such as running, swimming, cycling, or playing tennis or team sports. · Do not smoke. Smoking can make health problems worse. If you need help quitting, talk to your doctor about stop-smoking programs and medicines. These can increase your chances of quitting for good. · Protect your skin from too much sun. When you're outdoors from 10 a.m. to 4 p.m., stay in the shade or cover up with clothing and a hat with a wide brim. Wear sunglasses that block UV rays. Even when it's cloudy, put broad-spectrum sunscreen (SPF 30 or higher) on any exposed skin. · See a dentist one or two times a year for checkups and to have your teeth cleaned. · Wear a seat belt in the car. Follow your doctor's advice about when to have certain tests. These tests can spot problems early. · Cholesterol. Your doctor will tell you how often to have this done based on your overall health and other things that can increase your risk for heart attack and stroke. · Blood pressure. Have your blood pressure checked during a routine doctor visit. Your doctor will tell you how often to check your blood pressure based on your age, your blood pressure results, and other factors. · Prostate exam. Talk to your doctor about whether you should have a blood test (called a PSA test) for prostate cancer. Experts recommend that you discuss the benefits and risks of the test with your doctor before you decide whether to have this test. 
· Diabetes. Ask your doctor whether you should have tests for diabetes. · Vision. Some experts recommend that you have yearly exams for glaucoma and other age-related eye problems starting at age 48. · Hearing. Tell your doctor if you notice any change in your hearing. You can have tests to find out how well you hear. · Colorectal cancer. Your risk for colorectal cancer gets higher as you get older. Some experts say that adults should start regular screening at age 48 and stop at age 76. Others say to start before age 48 or continue after age 76. Talk with your doctor about your risk and when to start and stop screening. · Heart attack and stroke risk. At least every 4 to 6 years, you should have your risk for heart attack and stroke assessed. Your doctor uses factors such as your age, blood pressure, cholesterol, and whether you smoke or have diabetes to show what your risk for a heart attack or stroke is over the next 10 years. · Abdominal aortic aneurysm. Ask your doctor whether you should have a test to check for an aneurysm. You may need a test if you ever smoked or if your parent, brother, sister, or child has had an aneurysm. When should you call for help? Watch closely for changes in your health, and be sure to contact your doctor if you have any problems or symptoms that concern you. Where can you learn more? Go to http://www.[employer].[facility]/ Enter R538 in the search box to learn more about \"Well Visit, Men 48 to 72: Care Instructions. \" Current as of: May 27, 2020               Content Version: 12.6 © 2999-7844 [employer]. Care instructions adapted under license by [employer] (which disclaims liability or warranty for this information). If you have questions about a medical condition or this instruction, always ask your healthcare professional. [place] [address] any warranty or liability for your use of this information. Learning About Diabetes Food Guidelines Your Care Instructions Meal planning is important to manage diabetes. It helps keep your blood sugar at a target level (which you set with your doctor). You don't have to eat special foods. You can eat what your family eats, including sweets once in a while. But you do have to pay attention to how often you eat and how much you eat of certain foods. You may want to work with a dietitian or a certified diabetes educator (CDE) to help you plan meals and snacks. A dietitian or CDE can also help you lose weight if that is one of your goals. What should you know about eating carbs? Managing the amount of carbohydrate (carbs) you eat is an important part of healthy meals when you have diabetes. Carbohydrate is found in many foods. · Learn which foods have carbs. And learn the amounts of carbs in different foods. ? Bread, cereal, pasta, and rice have about 15 grams of carbs in a serving. A serving is 1 slice of bread (1 ounce), ½ cup of cooked cereal, or 1/3 cup of cooked pasta or rice. ? Fruits have 15 grams of carbs in a serving. A serving is 1 small fresh fruit, such as an apple or orange; ½ of a banana; ½ cup of cooked or canned fruit; ½ cup of fruit juice; 1 cup of melon or raspberries; or 2 tablespoons of dried fruit. ? Milk and no-sugar-added yogurt have 15 grams of carbs in a serving. A serving is 1 cup of milk or 2/3 cup of no-sugar-added yogurt. ? Starchy vegetables have 15 grams of carbs in a serving. A serving is ½ cup of mashed potatoes or sweet potato; 1 cup winter squash; ½ of a small baked potato; ½ cup of cooked beans; or ½ cup cooked corn or green peas. · Learn how much carbs to eat each day and at each meal. A dietitian or CDE can teach you how to keep track of the amount of carbs you eat. This is called carbohydrate counting. · If you are not sure how to count carbohydrate grams, use the Plate Method to plan meals. It is a good, quick way to make sure that you have a balanced meal. It also helps you spread carbs throughout the day. ? Divide your plate by types of foods. Put non-starchy vegetables on half the plate, meat or other protein food on one-quarter of the plate, and a grain or starchy vegetable in the final quarter of the plate. To this you can add a small piece of fruit and 1 cup of milk or yogurt, depending on how many carbs you are supposed to eat at a meal. 
· Try to eat about the same amount of carbs at each meal. Do not \"save up\" your daily allowance of carbs to eat at one meal. 
· Proteins have very little or no carbs per serving. Examples of proteins are beef, chicken, turkey, fish, eggs, tofu, cheese, cottage cheese, and peanut butter. A serving size of meat is 3 ounces, which is about the size of a deck of cards. Examples of meat substitute serving sizes (equal to 1 ounce of meat) are 1/4 cup of cottage cheese, 1 egg, 1 tablespoon of peanut butter, and ½ cup of tofu. How can you eat out and still eat healthy? · Learn to estimate the serving sizes of foods that have carbohydrate. If you measure food at home, it will be easier to estimate the amount in a serving of restaurant food. · If the meal you order has too much carbohydrate (such as potatoes, corn, or baked beans), ask to have a low-carbohydrate food instead. Ask for a salad or green vegetables. · If you use insulin, check your blood sugar before and after eating out to help you plan how much to eat in the future. · If you eat more carbohydrate at a meal than you had planned, take a walk or do other exercise. This will help lower your blood sugar. What else should you know? · Limit saturated fat, such as the fat from meat and dairy products. This is a healthy choice because people who have diabetes are at higher risk of heart disease. So choose lean cuts of meat and nonfat or low-fat dairy products. Use olive or canola oil instead of butter or shortening when cooking. · Don't skip meals. Your blood sugar may drop too low if you skip meals and take insulin or certain medicines for diabetes. · Check with your doctor before you drink alcohol. Alcohol can cause your blood sugar to drop too low. Alcohol can also cause a bad reaction if you take certain diabetes medicines. Follow-up care is a key part of your treatment and safety. Be sure to make and go to all appointments, and call your doctor if you are having problems. It's also a good idea to know your test results and keep a list of the medicines you take. Where can you learn more? Go to http://www.gray.com/ Enter M971 in the search box to learn more about \"Learning About Diabetes Food Guidelines. \" Current as of: December 20, 2019               Content Version: 12.6 © 6036-0265 [employer]. Care instructions adapted under license by [employer] (which disclaims liability or warranty for this information). If you have questions about a medical condition or this instruction, always ask your healthcare professional. [address] any warranty or liability for your use of this information. declines

## 2021-11-30 DIAGNOSIS — F19.94 OTHER PSYCHOACTIVE SUBSTANCE USE, UNSPECIFIED WITH PSYCHOACTIVE SUBSTANCE-INDUCED MOOD DISORDER: ICD-10-CM

## 2021-11-30 DIAGNOSIS — F12.10 CANNABIS ABUSE, UNCOMPLICATED: ICD-10-CM

## 2021-11-30 PROCEDURE — 99233 SBSQ HOSP IP/OBS HIGH 50: CPT

## 2021-11-30 RX ORDER — OLANZAPINE 15 MG/1
10 TABLET, FILM COATED ORAL AT BEDTIME
Refills: 0 | Status: DISCONTINUED | OUTPATIENT
Start: 2021-11-30 | End: 2021-12-02

## 2021-11-30 RX ORDER — NICOTINE POLACRILEX 2 MG
1 GUM BUCCAL DAILY
Refills: 0 | Status: DISCONTINUED | OUTPATIENT
Start: 2021-11-30 | End: 2022-01-03

## 2021-11-30 NOTE — BH INPATIENT PSYCHIATRY ASSESSMENT NOTE - HPI (INCLUDE ILLNESS QUALITY, SEVERITY, DURATION, TIMING, CONTEXT, MODIFYING FACTORS, ASSOCIATED SIGNS AND SYMPTOMS)
25 year old female transferred from [facility] gave little information; denies past adissions; states that she see " Shinto"  and broke up with her boyfriend who she states she has "known for 27 years and is the Devil"   talks in a disorganized manner about being a "Taoism" raised [employer] and going to [facility]; initially denied SA but later acknowledged cutting herself or almost cutting herself.  Expressed a preference for Zyprexa and Ativan but unclear when she first recived this. Acknowledges using cannabis.

## 2021-11-30 NOTE — BH INPATIENT PSYCHIATRY ASSESSMENT NOTE - NSBHMETABOLIC_PSY_ALL_CORE_FT
BMI: BMI (kg/m2): 45.1 (11-29-21 @ 18:40)  HbA1c: A1C with Estimated Average Glucose Result: 4.8 % (05-07-21 @ 10:10)    Glucose:   BP: 112/77 (11-30-21 @ 08:54) (112/77 - 127/86)  Lipid Panel: Date/Time: 05-07-21 @ 10:10  Cholesterol, Serum: 158  Direct LDL: --  HDL Cholesterol, Serum: 49  Total Cholesterol/HDL Ration Measurement: --  Triglycerides, Serum: 100

## 2021-11-30 NOTE — CHART NOTE - NSCHARTNOTEFT_GEN_A_CORE
Patient unable to cooperate with physical exam because of emotional issues; will monitor for medical need for physical examination.     Vital Signs Last 24 Hrs  T(C): 37.3 (30 Nov 2021 08:54), Max: 37.3 (30 Nov 2021 08:54)  T(F): 99.1 (30 Nov 2021 08:54), Max: 99.1 (30 Nov 2021 08:54)  HR: 106 (30 Nov 2021 08:54) (100 - 106)  BP: 112/77 (30 Nov 2021 08:54) (112/77 - 127/86)  BP(mean): --  RR: 18 (30 Nov 2021 08:54) (17 - 18)  SpO2: 98% (30 Nov 2021 08:54) (96% - 98%)

## 2021-11-30 NOTE — BH INPATIENT PSYCHIATRY ASSESSMENT NOTE - CURRENT MEDICATION
MEDICATIONS  (STANDING):  LORazepam     Tablet 1 milliGRAM(s) Oral three times a day  nicotine - 21 mG/24Hr(s) Patch 1 patch Transdermal daily  OLANZapine 10 milliGRAM(s) Oral at bedtime    MEDICATIONS  (PRN):  acetaminophen     Tablet .. 650 milliGRAM(s) Oral once PRN Moderate Pain (4 - 6)  aluminum hydroxide/magnesium hydroxide/simethicone Suspension 30 milliLiter(s) Oral every 4 hours PRN Dyspepsia  diphenhydrAMINE 25 milliGRAM(s) Oral every 6 hours PRN Rash and/or Itching  OLANZapine 5 milliGRAM(s) Oral every 12 hours PRN agitation

## 2021-11-30 NOTE — BH INPATIENT PSYCHIATRY ASSESSMENT NOTE - DESCRIPTION
patient demonstrates disorganized thinking; unable to clarify chronologicy.  Parents  "in an urn";  middle of two sibs both drug addicted; 9th grade education; unclear if every worked.

## 2021-11-30 NOTE — BH INPATIENT PSYCHIATRY ASSESSMENT NOTE - RISK ASSESSMENT
Static: drugs; disorganization; VH  Modifiable: psychosis; use of substances  Protective: doesn't want to die.

## 2021-11-30 NOTE — BH INPATIENT PSYCHIATRY ASSESSMENT NOTE - OTHER PAST PSYCHIATRIC HISTORY (INCLUDE DETAILS REGARDING ONSET, COURSE OF ILLNESS, INPATIENT/OUTPATIENT TREATMENT)
see HPI;  patient 25 years old transferred from [facility]; believed to be undomiciled;  and gives unreliable history denying pst admissions but has VH and talks about breaking up with her boyfriend "The Devil" laughs inappropriately; uses Cannabis daily and cigarettes daily.

## 2021-11-30 NOTE — BH INPATIENT PSYCHIATRY ASSESSMENT NOTE - MSE UNSTRUCTURED FT
Fund of knowledge intact; registers and recalls 3/3;  oriented x 3; recalls past events; alert;oriented x3;  laughs inappropriately; fair adls; fair eye conact; delusional with Methodist content; derailment; i&J: poor: limited if any awareness of medications; guarded or minimally acknowledging sxs; not reflective on issues that impact on symptoms  no tremor; normal gait. Alert; oriented; cognition intact; speech clear; no tremor or evidence of movement impairment.

## 2021-11-30 NOTE — BH INPATIENT PSYCHIATRY ASSESSMENT NOTE - NSBHCHARTREVIEWVS_PSY_A_CORE FT
Vital Signs Last 24 Hrs  T(C): 37.3 (11-30-21 @ 08:54), Max: 37.3 (11-30-21 @ 08:54)  T(F): 99.1 (11-30-21 @ 08:54), Max: 99.1 (11-30-21 @ 08:54)  HR: 106 (11-30-21 @ 08:54) (100 - 106)  BP: 112/77 (11-30-21 @ 08:54) (112/77 - 127/86)  BP(mean): --  RR: 18 (11-30-21 @ 08:54) (17 - 18)  SpO2: 98% (11-30-21 @ 08:54) (96% - 98%)

## 2021-11-30 NOTE — BH INPATIENT PSYCHIATRY ASSESSMENT NOTE - NSBHASSESSSUMMFT_PSY_ALL_CORE
25 years old transferred from [facility]; believed to be undomiciled;  and gives unreliable history denying pst admissions but has VH and talks about breaking up with her boyfriend "The Devil" laughs inappropriately; uses Cannabis daily and cigarettes daily.     ;;11/30: appears acutely psychosis with VH suggesting substance use is a contributing factor but also inappropriate affect and disorganized and delusional lthinking suggestion chronic psychosis.  Titrate upwards on Zyprexa; NRT; judicious use of Ativan for anxiety.

## 2021-11-30 NOTE — BH INPATIENT PSYCHIATRY ASSESSMENT NOTE - NSACTIVEVENT_PSY_ALL_CORE
Triggering events leading to humiliation, shame, and/or despair (e.g., Loss of relationship, financial or health status) (real or anticipated)/Current or pending social isolation/Substance intoxication or withdrawal/Perceived burden on family or others

## 2021-12-01 PROCEDURE — 99232 SBSQ HOSP IP/OBS MODERATE 35: CPT

## 2021-12-01 RX ADMIN — Medication 1 MILLIGRAM(S): at 12:06

## 2021-12-01 NOTE — BH INPATIENT PSYCHIATRY PROGRESS NOTE - NSBHASSESSSUMMFT_PSY_ALL_CORE
Assessment Summary	25 years old transferred from [facility]; believed to be undomiciled;  and gives unreliable history denying pst admissions but has VH and talks about breaking up with her boyfriend "The Devil" laughs inappropriately; uses Cannabis daily and cigarettes daily.     ;;11/30: appears acutely psychosis with VH suggesting substance use is a contributing factor but also inappropriate affect and disorganized and delusional lthinking suggestion chronic psychosis.  Titrate upwards on Zyprexa; NRT; judicious use of Ativan for anxiety.  ;;12/01: continues disorganized; guarded; Evidence for thought blocking and some delayed  response latencies.  no s/h I/i/p but guarded about AH or VH.  Sleep  appetite ok; no pain issues.  Continue Prolixin titration for psychosis

## 2021-12-01 NOTE — BH INPATIENT PSYCHIATRY PROGRESS NOTE - NSBHFUPINTERVALHXFT_PSY_A_CORE
No SI or HI but guarded;  denies hallucinations but poor eye contact; stays in bed; staff reports strange thoughts and poor ADLs.  Alert; oriented; cognition intact; speech clear; no tremor or evidence of movement impairment. speech clear

## 2021-12-01 NOTE — BH INPATIENT PSYCHIATRY PROGRESS NOTE - PRN MEDS
MEDICATIONS  (PRN):  acetaminophen     Tablet .. 650 milliGRAM(s) Oral once PRN Moderate Pain (4 - 6)  aluminum hydroxide/magnesium hydroxide/simethicone Suspension 30 milliLiter(s) Oral every 4 hours PRN Dyspepsia  diphenhydrAMINE 25 milliGRAM(s) Oral every 6 hours PRN Rash and/or Itching  OLANZapine 5 milliGRAM(s) Oral every 12 hours PRN agitation

## 2021-12-01 NOTE — BH INPATIENT PSYCHIATRY PROGRESS NOTE - MSE UNSTRUCTURED FT
Fund of knowledge intact; registers and recalls 3/3;  oriented x 3; recalls past events; alert;oriented x3;  laughs inappropriately; fair adls; fair eye conact; delusional with Moravian content; derailment; i&J: poor: limited if any awareness of medications; guarded or minimally acknowledging sxs; not reflective on issues that impact on symptoms  no tremor; normal gait. Alert; oriented; cognition intact; speech clear; no tremor or evidence of movement impairment.

## 2021-12-01 NOTE — BH SOCIAL WORK INITIAL PSYCHOSOCIAL EVALUATION - OTHER PAST PSYCHIATRIC HISTORY (INCLUDE DETAILS REGARDING ONSET, COURSE OF ILLNESS, INPATIENT/OUTPATIENT TREATMENT)
Patient presented disorganized, tangential and unable to provide past psychiatric history. Per chart review and Psyckes patient has had numerous past hospitalizations and has an ACT team in [facility].

## 2021-12-02 PROCEDURE — 99232 SBSQ HOSP IP/OBS MODERATE 35: CPT

## 2021-12-02 RX ORDER — OLANZAPINE 15 MG/1
15 TABLET, FILM COATED ORAL AT BEDTIME
Refills: 0 | Status: DISCONTINUED | OUTPATIENT
Start: 2021-12-02 | End: 2021-12-08

## 2021-12-02 RX ADMIN — Medication 1 MILLIGRAM(S): at 14:37

## 2021-12-02 RX ADMIN — Medication 1 PATCH: at 10:24

## 2021-12-02 RX ADMIN — Medication 1 PATCH: at 18:08

## 2021-12-02 RX ADMIN — Medication 1 MILLIGRAM(S): at 10:24

## 2021-12-02 NOTE — BH INPATIENT PSYCHIATRY PROGRESS NOTE - NSBHFUPINTERVALHXFT_PSY_A_CORE
"I didn't know there were groups."   Isolative; stays in bed; makes minimal eye contact; not conversational.  Not endorsing  suicidal or homicidal ideation intent or plans; no mention of auditory or visual hallucinations  but Evidence for thought blocking and long response latencies.  marginal adls.

## 2021-12-02 NOTE — BH INPATIENT PSYCHIATRY PROGRESS NOTE - NSBHMETABOLIC_PSY_ALL_CORE_FT
BMI: BMI (kg/m2): 45.1 (11-29-21 @ 18:40)  HbA1c: A1C with Estimated Average Glucose Result: 4.8 % (05-07-21 @ 10:10)    Glucose:   BP: 124/86 (12-02-21 @ 10:30) (112/77 - 127/86)  Lipid Panel: Date/Time: 05-07-21 @ 10:10  Cholesterol, Serum: 158  Direct LDL: --  HDL Cholesterol, Serum: 49  Total Cholesterol/HDL Ration Measurement: --  Triglycerides, Serum: 100

## 2021-12-02 NOTE — BH INPATIENT PSYCHIATRY PROGRESS NOTE - NSBHASSESSSUMMFT_PSY_ALL_CORE
Assessment Summary	25 years old transferred from [facility]; believed to be undomiciled;  and gives unreliable history denying pst admissions but has VH and talks about breaking up with her boyfriend "The Devil" laughs inappropriately; uses Cannabis daily and cigarettes daily.     ;;11/30: appears acutely psychosis with VH suggesting substance use is a contributing factor but also inappropriate affect and disorganized and delusional lthinking suggestion chronic psychosis.  Titrate upwards on Zyprexa; NRT; judicious use of Ativan for anxiety.  ;;12/01: continues disorganized; guarded; Evidence for thought blocking and some delayed  response latencies.  no s/h I/i/p but guarded about AH or VH.  Sleep  appetite ok; no pain issues.  Continue Prolixin titration for psychosis    ;;12/02: continue isolative; tapering Ativan and raising Zyprexa at night.  guarded but denies SI or AH.

## 2021-12-02 NOTE — BH INPATIENT PSYCHIATRY PROGRESS NOTE - NSBHMSEREMMEM_PSY_A_CORE
Normal Rituxan Counseling:  I discussed with the patient the risks of Rituxan infusions. Side effects can include infusion reactions, severe drug rashes including mucocutaneous reactions, reactivation of latent hepatitis and other infections and rarely progressive multifocal leukoencephalopathy.  All of the patient's questions and concerns were addressed.

## 2021-12-02 NOTE — BH INPATIENT PSYCHIATRY PROGRESS NOTE - CURRENT MEDICATION
MEDICATIONS  (STANDING):  LORazepam     Tablet 1 milliGRAM(s) Oral <User Schedule>  nicotine - 21 mG/24Hr(s) Patch 1 patch Transdermal daily  OLANZapine 15 milliGRAM(s) Oral at bedtime    MEDICATIONS  (PRN):  acetaminophen     Tablet .. 650 milliGRAM(s) Oral once PRN Moderate Pain (4 - 6)  aluminum hydroxide/magnesium hydroxide/simethicone Suspension 30 milliLiter(s) Oral every 4 hours PRN Dyspepsia  diphenhydrAMINE 25 milliGRAM(s) Oral every 6 hours PRN Rash and/or Itching  OLANZapine 5 milliGRAM(s) Oral every 12 hours PRN agitation

## 2021-12-02 NOTE — BH INPATIENT PSYCHIATRY PROGRESS NOTE - NSBHCHARTREVIEWVS_PSY_A_CORE FT
Vital Signs Last 24 Hrs  T(C): 36.8 (12-02-21 @ 10:30), Max: 36.8 (12-02-21 @ 10:30)  T(F): 98.2 (12-02-21 @ 10:30), Max: 98.2 (12-02-21 @ 10:30)  HR: 98 (12-02-21 @ 10:30) (98 - 98)  BP: 124/86 (12-02-21 @ 10:30) (124/86 - 124/86)  BP(mean): --  RR: 18 (12-02-21 @ 10:30) (18 - 18)  SpO2: 100% (12-02-21 @ 10:30) (100% - 100%)

## 2021-12-03 DIAGNOSIS — F25.9 SCHIZOAFFECTIVE DISORDER, UNSPECIFIED: ICD-10-CM

## 2021-12-03 PROCEDURE — 99232 SBSQ HOSP IP/OBS MODERATE 35: CPT

## 2021-12-03 RX ADMIN — OLANZAPINE 5 MILLIGRAM(S): 15 TABLET, FILM COATED ORAL at 11:22

## 2021-12-03 RX ADMIN — Medication 1 PATCH: at 06:37

## 2021-12-03 RX ADMIN — Medication 1 MILLIGRAM(S): at 15:11

## 2021-12-03 NOTE — BH INPATIENT PSYCHIATRY PROGRESS NOTE - NSBHCHARTREVIEWVS_PSY_A_CORE FT
Vital Signs Last 24 Hrs  T(C): 36.8 (12-02-21 @ 10:30), Max: 36.8 (12-02-21 @ 10:30)  T(F): 98.2 (12-02-21 @ 10:30), Max: 98.2 (12-02-21 @ 10:30)  HR: 98 (12-02-21 @ 10:30) (98 - 98)  BP: 124/86 (12-02-21 @ 10:30) (124/86 - 124/86)  BP(mean): --  RR: 18 (12-02-21 @ 10:30) (18 - 18)  SpO2: 100% (12-02-21 @ 10:30) (100% - 100%)     Vital Signs Last 24 Hrs  T(C): 36.7 (12-03-21 @ 09:00), Max: 36.7 (12-03-21 @ 09:00)  T(F): 98 (12-03-21 @ 09:00), Max: 98 (12-03-21 @ 09:00)  HR: 95 (12-03-21 @ 09:00) (95 - 95)  BP: 134/86 (12-03-21 @ 09:00) (134/86 - 134/86)  BP(mean): --  RR: 18 (12-03-21 @ 09:00) (18 - 18)  SpO2: 100% (12-03-21 @ 09:00) (100% - 100%)

## 2021-12-03 NOTE — BH INPATIENT PSYCHIATRY PROGRESS NOTE - NSBHFUPINTERVALHXFT_PSY_A_CORE
Isolative; stays in bed; makes minimal eye contact; not conversational. poor adls poor hygiene; came out of room at noon demanding Ativan but took prn Zyprexa; Alert; oriented; cognition intact; speech clear; no tremor or evidence of movement impairment.

## 2021-12-03 NOTE — BH INPATIENT PSYCHIATRY PROGRESS NOTE - NSBHASSESSSUMMFT_PSY_ALL_CORE
Assessment Summary	25 years old transferred from [facility]; believed to be undomiciled;  and gives unreliable history denying pst admissions but has VH and talks about breaking up with her boyfriend "The Devil" laughs inappropriately; uses Cannabis daily and cigarettes daily.     ;;11/30: appears acutely psychosis with VH suggesting substance use is a contributing factor but also inappropriate affect and disorganized and delusional lthinking suggestion chronic psychosis.  Titrate upwards on Zyprexa; NRT; judicious use of Ativan for anxiety.  ;;12/01: continues disorganized; guarded; Evidence for thought blocking and some delayed  response latencies.  no s/h I/i/p but guarded about AH or VH.  Sleep  appetite ok; no pain issues.  Continue Prolixin titration for psychosis    ;;12/02: continue isolative; tapering Ativan and raising Zyprexa at night.  guarded but denies SI or AH.   ;;12/03: continues isolative with poor adls; med seeking; guarded; does not talk about sxs; received 5mg Zyprexa; Zyprexa being titrated upwards    Current medications acetaminophen     Tablet .. 650 milliGRAM(s) Oral once PRN  aluminum hydroxide/magnesium hydroxide/simethicone Suspension 30 milliLiter(s) Oral every 4 hours PRN  diphenhydrAMINE 25 milliGRAM(s) Oral every 6 hours PRN  LORazepam     Tablet 1 milliGRAM(s) Oral <User Schedule>  nicotine - 21 mG/24Hr(s) Patch 1 patch Transdermal daily  OLANZapine 5 milliGRAM(s) Oral every 12 hours PRN  OLANZapine 15 milliGRAM(s) Oral at bedtime to continue titration for psychosis and disorganization

## 2021-12-03 NOTE — BH INPATIENT PSYCHIATRY PROGRESS NOTE - NSBHMETABOLIC_PSY_ALL_CORE_FT
BMI: BMI (kg/m2): 45.1 (11-29-21 @ 18:40)  HbA1c: A1C with Estimated Average Glucose Result: 4.8 % (05-07-21 @ 10:10)    Glucose:   BP: 124/86 (12-02-21 @ 10:30) (112/77 - 124/86)  Lipid Panel: Date/Time: 05-07-21 @ 10:10  Cholesterol, Serum: 158  Direct LDL: --  HDL Cholesterol, Serum: 49  Total Cholesterol/HDL Ration Measurement: --  Triglycerides, Serum: 100   BMI: BMI (kg/m2): 45.1 (11-29-21 @ 18:40)  HbA1c: A1C with Estimated Average Glucose Result: 4.8 % (05-07-21 @ 10:10)    Glucose:   BP: 134/86 (12-03-21 @ 09:00) (124/86 - 134/86)  Lipid Panel: Date/Time: 05-07-21 @ 10:10  Cholesterol, Serum: 158  Direct LDL: --  HDL Cholesterol, Serum: 49  Total Cholesterol/HDL Ration Measurement: --  Triglycerides, Serum: 100

## 2021-12-04 RX ORDER — DIPHENHYDRAMINE HCL 50 MG
50 CAPSULE ORAL ONCE
Refills: 0 | Status: COMPLETED | OUTPATIENT
Start: 2021-12-04 | End: 2021-12-04

## 2021-12-04 RX ORDER — HALOPERIDOL DECANOATE 100 MG/ML
10 INJECTION INTRAMUSCULAR ONCE
Refills: 0 | Status: COMPLETED | OUTPATIENT
Start: 2021-12-04 | End: 2021-12-04

## 2021-12-04 RX ADMIN — Medication 2 MILLIGRAM(S): at 13:00

## 2021-12-04 RX ADMIN — HALOPERIDOL DECANOATE 10 MILLIGRAM(S): 100 INJECTION INTRAMUSCULAR at 13:00

## 2021-12-04 RX ADMIN — Medication 50 MILLIGRAM(S): at 13:00

## 2021-12-05 RX ADMIN — Medication 1 MILLIGRAM(S): at 14:51

## 2021-12-06 PROCEDURE — 99232 SBSQ HOSP IP/OBS MODERATE 35: CPT

## 2021-12-06 RX ADMIN — Medication 1 MILLIGRAM(S): at 21:35

## 2021-12-06 RX ADMIN — OLANZAPINE 15 MILLIGRAM(S): 15 TABLET, FILM COATED ORAL at 21:35

## 2021-12-06 NOTE — BH INPATIENT PSYCHIATRY PROGRESS NOTE - NSBHFUPINTERVALHXFT_PSY_A_CORE
spat at staff and refused last night's medication; received prn medications;  not communicative with writer.  Isolative; stays in bed; makes minimal eye contact; not conversational.

## 2021-12-06 NOTE — BH INPATIENT PSYCHIATRY PROGRESS NOTE - NSBHASSESSSUMMFT_PSY_ALL_CORE
Assessment Summary	25 years old transferred from [facility]; believed to be undomiciled;  and gives unreliable history denying pst admissions but has VH and talks about breaking up with her boyfriend "The Devil" laughs inappropriately; uses Cannabis daily and cigarettes daily.     ;;11/30: appears acutely psychosis with VH suggesting substance use is a contributing factor but also inappropriate affect and disorganized and delusional lthinking suggestion chronic psychosis.  Titrate upwards on Zyprexa; NRT; judicious use of Ativan for anxiety.  ;;12/01: continues disorganized; guarded; Evidence for thought blocking and some delayed  response latencies.  no s/h I/i/p but guarded about AH or VH.  Sleep  appetite ok; no pain issues.  Continue Prolixin titration for psychosis    ;;12/02: continue isolative; tapering Ativan and raising Zyprexa at night.  guarded but denies SI or AH.   ;;12/03: continues isolative with poor adls; med seeking; guarded; does not talk about sxs; received 5mg Zyprexa; Zyprexa being titrated upwards  ;;12/06: refused medications last night; received prn after spitting at staff; isolated and internally preoccupied.      Current medications acetaminophen     Tablet .. 650 milliGRAM(s) Oral once PRN  aluminum hydroxide/magnesium hydroxide/simethicone Suspension 30 milliLiter(s) Oral every 4 hours PRN  diphenhydrAMINE 25 milliGRAM(s) Oral every 6 hours PRN  LORazepam     Tablet 1 milliGRAM(s) Oral <User Schedule>  nicotine - 21 mG/24Hr(s) Patch 1 patch Transdermal daily  OLANZapine 5 milliGRAM(s) Oral every 12 hours PRN  OLANZapine 15 milliGRAM(s) Oral at bedtime to continue titration for psychosis and disorganization

## 2021-12-06 NOTE — BH INPATIENT PSYCHIATRY PROGRESS NOTE - NSBHMETABOLIC_PSY_ALL_CORE_FT
BMI: BMI (kg/m2): 45.1 (11-29-21 @ 18:40)  HbA1c: A1C with Estimated Average Glucose Result: 4.8 % (05-07-21 @ 10:10)    Glucose:   BP: 140/84 (12-04-21 @ 09:00) (140/84 - 140/84)  Lipid Panel: Date/Time: 05-07-21 @ 10:10  Cholesterol, Serum: 158  Direct LDL: --  HDL Cholesterol, Serum: 49  Total Cholesterol/HDL Ration Measurement: --  Triglycerides, Serum: 100

## 2021-12-07 PROCEDURE — 99232 SBSQ HOSP IP/OBS MODERATE 35: CPT

## 2021-12-07 RX ADMIN — OLANZAPINE 15 MILLIGRAM(S): 15 TABLET, FILM COATED ORAL at 21:43

## 2021-12-07 RX ADMIN — Medication 1 MILLIGRAM(S): at 21:43

## 2021-12-07 RX ADMIN — Medication 1 MILLIGRAM(S): at 13:48

## 2021-12-07 NOTE — BH INPATIENT PSYCHIATRY PROGRESS NOTE - PRN MEDS
MEDICATIONS  (PRN):  acetaminophen     Tablet .. 650 milliGRAM(s) Oral once PRN Moderate Pain (4 - 6)  aluminum hydroxide/magnesium hydroxide/simethicone Suspension 30 milliLiter(s) Oral every 4 hours PRN Dyspepsia  diphenhydrAMINE 25 milliGRAM(s) Oral every 6 hours PRN Rash and/or Itching  OLANZapine 5 milliGRAM(s) Oral every 12 hours PRN agitation   MEDICATIONS  (PRN):  aluminum hydroxide/magnesium hydroxide/simethicone Suspension 30 milliLiter(s) Oral every 4 hours PRN Dyspepsia  diphenhydrAMINE 25 milliGRAM(s) Oral every 6 hours PRN Rash and/or Itching  LORazepam     Tablet 1 milliGRAM(s) Oral every 8 hours PRN anxiety  OLANZapine 5 milliGRAM(s) Oral every 12 hours PRN agitation

## 2021-12-07 NOTE — BH INPATIENT PSYCHIATRY PROGRESS NOTE - NSBHFUPINTERVALHXFT_PSY_A_CORE
Isolative; stays in bed; makes minimal eye contact; not conversational. poor adls poor hygiene; came out of room at noon demanding Ativan but took prn Zyprexa; Alert; oriented; cognition intact; speech clear; no tremor or evidence of movement impairment.  Little progress ; had been non-compliant with pm Zyprexa for a few days.  Isolative; stays in bed; makes minimal eye contact; but more conversational; over the week end attended groups and participated actively and appropriately; fair adls. Not endorsing  suicidal or homicidal ideation intent or plans; no mention of auditory or visual hallucinations .

## 2021-12-07 NOTE — BH INPATIENT PSYCHIATRY PROGRESS NOTE - NSBHCHARTREVIEWVS_PSY_A_CORE FT
Vital Signs Last 24 Hrs  T(C): 36.7 (12-06-21 @ 17:41), Max: 36.7 (12-06-21 @ 17:41)  T(F): 98.1 (12-06-21 @ 17:41), Max: 98.1 (12-06-21 @ 17:41)  HR: 109 (12-06-21 @ 17:41) (109 - 109)  BP: 141/84 (12-06-21 @ 17:41) (141/84 - 141/84)  BP(mean): --  RR: 16 (12-06-21 @ 17:41) (16 - 16)  SpO2: 97% (12-06-21 @ 17:41) (97% - 97%)     Vital Signs Last 24 Hrs  T(C): --  T(F): --  HR: --  BP: --  BP(mean): --  RR: --  SpO2: --

## 2021-12-07 NOTE — BH INPATIENT PSYCHIATRY PROGRESS NOTE - CURRENT MEDICATION
MEDICATIONS  (STANDING):  LORazepam     Tablet 1 milliGRAM(s) Oral <User Schedule>  nicotine - 21 mG/24Hr(s) Patch 1 patch Transdermal daily  OLANZapine 15 milliGRAM(s) Oral at bedtime    MEDICATIONS  (PRN):  acetaminophen     Tablet .. 650 milliGRAM(s) Oral once PRN Moderate Pain (4 - 6)  aluminum hydroxide/magnesium hydroxide/simethicone Suspension 30 milliLiter(s) Oral every 4 hours PRN Dyspepsia  diphenhydrAMINE 25 milliGRAM(s) Oral every 6 hours PRN Rash and/or Itching  OLANZapine 5 milliGRAM(s) Oral every 12 hours PRN agitation   MEDICATIONS  (STANDING):  nicotine - 21 mG/24Hr(s) Patch 1 patch Transdermal daily  OLANZapine 20 milliGRAM(s) Oral at bedtime    MEDICATIONS  (PRN):  aluminum hydroxide/magnesium hydroxide/simethicone Suspension 30 milliLiter(s) Oral every 4 hours PRN Dyspepsia  diphenhydrAMINE 25 milliGRAM(s) Oral every 6 hours PRN Rash and/or Itching  LORazepam     Tablet 1 milliGRAM(s) Oral every 8 hours PRN anxiety  OLANZapine 5 milliGRAM(s) Oral every 12 hours PRN agitation

## 2021-12-07 NOTE — BH INPATIENT PSYCHIATRY PROGRESS NOTE - NSCGIIMPROVESX_PSY_ALL_CORE
5 = Minimally worse - slightly worse but may not be clinically meaningful; may represent very little change in basic clinical status or functional capacity 2 = Much improved - notably better with signficant reduction of symptoms; increase in the level of functioning but some symptoms remain

## 2021-12-07 NOTE — BH INPATIENT PSYCHIATRY PROGRESS NOTE - NSBHMETABOLIC_PSY_ALL_CORE_FT
BMI: BMI (kg/m2): 45.1 (11-29-21 @ 18:40)  HbA1c: A1C with Estimated Average Glucose Result: 4.8 % (05-07-21 @ 10:10)    Glucose:   BP: 141/84 (12-06-21 @ 17:41) (141/84 - 141/84)  Lipid Panel: Date/Time: 05-07-21 @ 10:10  Cholesterol, Serum: 158  Direct LDL: --  HDL Cholesterol, Serum: 49  Total Cholesterol/HDL Ration Measurement: --  Triglycerides, Serum: 100   BMI: BMI (kg/m2): 45.1 (11-29-21 @ 18:40)  HbA1c: A1C with Estimated Average Glucose Result: 4.8 % (05-07-21 @ 10:10)    Glucose:   BP: 137/81 (12-19-21 @ 10:00) (121/91 - 137/81)  Lipid Panel: Date/Time: 05-07-21 @ 10:10  Cholesterol, Serum: 158  Direct LDL: --  HDL Cholesterol, Serum: 49  Total Cholesterol/HDL Ration Measurement: --  Triglycerides, Serum: 100

## 2021-12-07 NOTE — BH INPATIENT PSYCHIATRY PROGRESS NOTE - NSBHASSESSSUMMFT_PSY_ALL_CORE
Assessment Summary	25 years old transferred from [facility]; believed to be undomiciled;  and gives unreliable history denying pst admissions but has VH and talks about breaking up with her boyfriend "The Devil" laughs inappropriately; uses Cannabis daily and cigarettes daily.     ;;11/30: appears acutely psychosis with VH suggesting substance use is a contributing factor but also inappropriate affect and disorganized and delusional lthinking suggestion chronic psychosis.  Titrate upwards on Zyprexa; NRT; judicious use of Ativan for anxiety.  ;;12/01: continues disorganized; guarded; Evidence for thought blocking and some delayed  response latencies.  no s/h I/i/p but guarded about AH or VH.  Sleep  appetite ok; no pain issues.  Continue Prolixin titration for psychosis    ;;12/02: continue isolative; tapering Ativan and raising Zyprexa at night.  guarded but denies SI or AH.   ;;12/03: continues isolative with poor adls; med seeking; guarded; does not talk about sxs; received 5mg Zyprexa; Zyprexa being titrated upwards    Current medications acetaminophen     Tablet .. 650 milliGRAM(s) Oral once PRN  aluminum hydroxide/magnesium hydroxide/simethicone Suspension 30 milliLiter(s) Oral every 4 hours PRN  diphenhydrAMINE 25 milliGRAM(s) Oral every 6 hours PRN  LORazepam     Tablet 1 milliGRAM(s) Oral <User Schedule>  nicotine - 21 mG/24Hr(s) Patch 1 patch Transdermal daily  OLANZapine 5 milliGRAM(s) Oral every 12 hours PRN  OLANZapine 15 milliGRAM(s) Oral at bedtime to continue titration for psychosis and disorganization    Assessment Summary	25 years old transferred from [facility]; believed to be undomiciled;  and gives unreliable history denying pst admissions but has VH and talks about breaking up with her boyfriend "The Devil" laughs inappropriately; uses Cannabis daily and cigarettes daily.     ;;11/30: appears acutely psychosis with VH suggesting substance use is a contributing factor but also inappropriate affect and disorganized and delusional lthinking suggestion chronic psychosis.  Titrate upwards on Zyprexa; NRT; judicious use of Ativan for anxiety.  ;;12/01: continues disorganized; guarded; Evidence for thought blocking and some delayed  response latencies.  no s/h I/i/p but guarded about AH or VH.  Sleep  appetite ok; no pain issues.  Continue Prolixin titration for psychosis    ;;12/02: continue isolative; tapering Ativan and raising Zyprexa at night.  guarded but denies SI or AH.   ;;12/03: continues isolative with poor adls; med seeking; guarded; does not talk about sxs; received 5mg Zyprexa; Zyprexa being titrated upwards  ;;12/06: had spat on staff and had stopped taking pm Zyprexa;  ;;12/07: compliant with pm Zyprexa but isolative.  Stays in bed. Not endorsing  suicidal or homicidal ideation intent or plans; no mention of auditory or visual hallucinations but guarded and suspicious.     Current medications acetaminophen     Tablet .. 650 milliGRAM(s) Oral once PRN  aluminum hydroxide/magnesium hydroxide/simethicone Suspension 30 milliLiter(s) Oral every 4 hours PRN  diphenhydrAMINE 25 milliGRAM(s) Oral every 6 hours PRN  LORazepam     Tablet 1 milliGRAM(s) Oral <User Schedule>  nicotine - 21 mG/24Hr(s) Patch 1 patch Transdermal daily  OLANZapine 5 milliGRAM(s) Oral every 12 hours PRN  OLANZapine 15 milliGRAM(s) Oral at bedtime   Assessment Summary	25 years old transferred from [facility]; believed to be undomiciled;  and gives unreliable history denying pst admissions but has VH and talks about breaking up with her boyfriend "The Devil" laughs inappropriately; uses Cannabis daily and cigarettes daily.     ;;11/30: appears acutely psychosis with VH suggesting substance use is a contributing factor but also inappropriate affect and disorganized and delusional lthinking suggestion chronic psychosis.  Titrate upwards on Zyprexa; NRT; judicious use of Ativan for anxiety.  ;;12/01: continues disorganized; guarded; Evidence for thought blocking and some delayed  response latencies.  no s/h I/i/p but guarded about AH or VH.  Sleep  appetite ok; no pain issues.  Continue Prolixin titration for psychosis    ;;12/02: continue isolative; tapering Ativan and raising Zyprexa at night.  guarded but denies SI or AH.   ;;12/03: continues isolative with poor adls; med seeking; guarded; does not talk about sxs; received 5mg Zyprexa; Zyprexa being titrated upwards  ;;12/06: had spat on staff and had stopped taking pm Zyprexa;  ;;12/07: compliant with pm Zyprexa but isolative.  Stays in bed. Not endorsing  suicidal or homicidal ideation intent or plans; no mention of auditory or visual hallucinations but guarded and suspicious.   ;;12/20: after many days of staying in bed has gradually improved such that patient is taking showers; attending groups; participating in groups; does not want FGA such as Prolixin (with CROCKER option); less drowsy in am. Not endorsing  suicidal or homicidal ideation intent or plans; no mention of auditory or visual hallucinations but still paranoid and avoidant.

## 2021-12-08 RX ORDER — OLANZAPINE 15 MG/1
20 TABLET, FILM COATED ORAL AT BEDTIME
Refills: 0 | Status: DISCONTINUED | OUTPATIENT
Start: 2021-12-08 | End: 2022-01-03

## 2021-12-08 RX ADMIN — Medication 1 MILLIGRAM(S): at 22:39

## 2021-12-08 RX ADMIN — OLANZAPINE 5 MILLIGRAM(S): 15 TABLET, FILM COATED ORAL at 18:40

## 2021-12-08 RX ADMIN — Medication 1 MILLIGRAM(S): at 12:33

## 2021-12-08 RX ADMIN — OLANZAPINE 20 MILLIGRAM(S): 15 TABLET, FILM COATED ORAL at 22:31

## 2021-12-08 NOTE — BH INPATIENT PSYCHIATRY PROGRESS NOTE - NSBHASSESSSUMMFT_PSY_ALL_CORE
Assessment Summary	25 years old transferred from [facility]; believed to be undomiciled;  and gives unreliable history denying pst admissions but has VH and talks about breaking up with her boyfriend "The Devil" laughs inappropriately; uses Cannabis daily and cigarettes daily.     ;;11/30: appears acutely psychosis with VH suggesting substance use is a contributing factor but also inappropriate affect and disorganized and delusional lthinking suggestion chronic psychosis.  Titrate upwards on Zyprexa; NRT; judicious use of Ativan for anxiety.  ;;12/01: continues disorganized; guarded; Evidence for thought blocking and some delayed  response latencies.  no s/h I/i/p but guarded about AH or VH.  Sleep  appetite ok; no pain issues.  Continue Prolixin titration for psychosis    ;;12/02: continue isolative; tapering Ativan and raising Zyprexa at night.  guarded but denies SI or AH.   ;;12/03: continues isolative with poor adls; med seeking; guarded; does not talk about sxs; received 5mg Zyprexa; Zyprexa being titrated upwards  ;;12/06: had spat on staff and had stopped taking pm Zyprexa;  ;;12/07: compliant with pm Zyprexa but isolative.  Stays in bed. Not endorsing  suicidal or homicidal ideation intent or plans; no mention of auditory or visual hallucinations but guarded and suspicious.   ;;12/08: accepting Zyprexa will continue titration; tends to stay in bed in the am; no new behavioral issues.     Current medications acetaminophen     Tablet .. 650 milliGRAM(s) Oral once PRN  aluminum hydroxide/magnesium hydroxide/simethicone Suspension 30 milliLiter(s) Oral every 4 hours PRN  diphenhydrAMINE 25 milliGRAM(s) Oral every 6 hours PRN  LORazepam     Tablet 1 milliGRAM(s) Oral <User Schedule>  nicotine - 21 mG/24Hr(s) Patch 1 patch Transdermal daily  OLANZapine 5 milliGRAM(s) Oral every 12 hours PRN  OLANZapine 15 milliGRAM(s) Oral at bedtime; raise to 20mg at night for psychosis and mood.

## 2021-12-08 NOTE — BH INPATIENT PSYCHIATRY PROGRESS NOTE - NSBHMETABOLIC_PSY_ALL_CORE_FT
BMI: BMI (kg/m2): 45.1 (11-29-21 @ 18:40)  HbA1c: A1C with Estimated Average Glucose Result: 4.8 % (05-07-21 @ 10:10)    Glucose:   BP: 141/84 (12-06-21 @ 17:41) (141/84 - 141/84)  Lipid Panel: Date/Time: 05-07-21 @ 10:10  Cholesterol, Serum: 158  Direct LDL: --  HDL Cholesterol, Serum: 49  Total Cholesterol/HDL Ration Measurement: --  Triglycerides, Serum: 100

## 2021-12-08 NOTE — BH INPATIENT PSYCHIATRY PROGRESS NOTE - NSBHFUPINTERVALHXFT_PSY_A_CORE
"I've been up." (asleep during the morning).  "I don't know about that " (Taoist);  "Check that my diet is vegetarian"  (no meat).  Isolative; stays in bed; makes minimal eye contact; limited conversation.  Not endorsing  suicidal or homicidal ideation intent or plans; no mention of auditory or visual hallucinations  i&J: poor: limited if any awareness of medications; guarded or minimally acknowledging sxs; not reflective on issues that impact on symptoms

## 2021-12-09 PROCEDURE — 99221 1ST HOSP IP/OBS SF/LOW 40: CPT

## 2021-12-09 RX ADMIN — Medication 1 MILLIGRAM(S): at 22:19

## 2021-12-09 RX ADMIN — Medication 1 MILLIGRAM(S): at 14:37

## 2021-12-09 RX ADMIN — OLANZAPINE 20 MILLIGRAM(S): 15 TABLET, FILM COATED ORAL at 22:18

## 2021-12-09 NOTE — BH INPATIENT PSYCHIATRY PROGRESS NOTE - MSE UNSTRUCTURED FT
asleep in bed both in morning and afternoon;  staff reports patient attends one group and was appropriate.

## 2021-12-09 NOTE — BH INPATIENT PSYCHIATRY PROGRESS NOTE - NSBHMSESPABN_PSY_A_CORE
Slowed rate/Decreased productivity

## 2021-12-09 NOTE — BH INPATIENT PSYCHIATRY PROGRESS NOTE - NSBHASSESSSUMMFT_PSY_ALL_CORE
Assessment Summary	25 years old transferred from [facility]; believed to be undomiciled;  and gives unreliable history denying pst admissions but has VH and talks about breaking up with her boyfriend "The Devil" laughs inappropriately; uses Cannabis daily and cigarettes daily.     ;;11/30: appears acutely psychosis with VH suggesting substance use is a contributing factor but also inappropriate affect and disorganized and delusional lthinking suggestion chronic psychosis.  Titrate upwards on Zyprexa; NRT; judicious use of Ativan for anxiety.  ;;12/01: continues disorganized; guarded; Evidence for thought blocking and some delayed  response latencies.  no s/h I/i/p but guarded about AH or VH.  Sleep  appetite ok; no pain issues.  Continue Prolixin titration for psychosis    ;;12/02: continue isolative; tapering Ativan and raising Zyprexa at night.  guarded but denies SI or AH.   ;;12/03: continues isolative with poor adls; med seeking; guarded; does not talk about sxs; received 5mg Zyprexa; Zyprexa being titrated upwards  ;;12/06: had spat on staff and had stopped taking pm Zyprexa;  ;;12/07: compliant with pm Zyprexa but isolative.  Stays in bed. Not endorsing  suicidal or homicidal ideation intent or plans; no mention of auditory or visual hallucinations but guarded and suspicious.   ;;12/08: accepting Zyprexa will continue titration; tends to stay in bed in the am; no new behavioral issues.  ;;12/09: some group attendance with participation.     Current medications acetaminophen     Tablet .. 650 milliGRAM(s) Oral once PRN  aluminum hydroxide/magnesium hydroxide/simethicone Suspension 30 milliLiter(s) Oral every 4 hours PRN  diphenhydrAMINE 25 milliGRAM(s) Oral every 6 hours PRN  LORazepam     Tablet 1 milliGRAM(s) Oral <User Schedule>  nicotine - 21 mG/24Hr(s) Patch 1 patch Transdermal daily  OLANZapine 5 milliGRAM(s) Oral every 12 hours PRN  OLANZapine 15 milliGRAM(s) Oral at bedtime; raise to 20mg at night for psychosis and mood.

## 2021-12-09 NOTE — BH INPATIENT PSYCHIATRY PROGRESS NOTE - NSBHMETABOLIC_PSY_ALL_CORE_FT
BMI: BMI (kg/m2): 45.1 (11-29-21 @ 18:40)  HbA1c: A1C with Estimated Average Glucose Result: 4.8 % (05-07-21 @ 10:10)    Glucose:   BP: 141/98 (12-08-21 @ 17:33) (141/84 - 141/98)  Lipid Panel: Date/Time: 05-07-21 @ 10:10  Cholesterol, Serum: 158  Direct LDL: --  HDL Cholesterol, Serum: 49  Total Cholesterol/HDL Ration Measurement: --  Triglycerides, Serum: 100

## 2021-12-09 NOTE — BH INPATIENT PSYCHIATRY PROGRESS NOTE - NSBHMSETHTPROC_PSY_A_CORE
Impaired reasoning

## 2021-12-09 NOTE — BH INPATIENT PSYCHIATRY PROGRESS NOTE - NSBHMSETHTASSOC_PSY_A_CORE
Unable to assess
2.06
Unable to assess

## 2021-12-09 NOTE — BH INPATIENT PSYCHIATRY PROGRESS NOTE - NSBHCHARTREVIEWVS_PSY_A_CORE FT
Vital Signs Last 24 Hrs  T(C): 36.7 (12-08-21 @ 17:33), Max: 36.7 (12-08-21 @ 17:33)  T(F): 98 (12-08-21 @ 17:33), Max: 98 (12-08-21 @ 17:33)  HR: 112 (12-08-21 @ 17:33) (112 - 112)  BP: 141/98 (12-08-21 @ 17:33) (141/98 - 141/98)  BP(mean): --  RR: 16 (12-08-21 @ 17:33) (16 - 16)  SpO2: 99% (12-08-21 @ 17:33) (99% - 99%)

## 2021-12-09 NOTE — BH INPATIENT PSYCHIATRY PROGRESS NOTE - CURRENT MEDICATION
MEDICATIONS  (STANDING):  LORazepam     Tablet 1 milliGRAM(s) Oral <User Schedule>  nicotine - 21 mG/24Hr(s) Patch 1 patch Transdermal daily  OLANZapine 20 milliGRAM(s) Oral at bedtime    MEDICATIONS  (PRN):  acetaminophen     Tablet .. 650 milliGRAM(s) Oral once PRN Moderate Pain (4 - 6)  aluminum hydroxide/magnesium hydroxide/simethicone Suspension 30 milliLiter(s) Oral every 4 hours PRN Dyspepsia  diphenhydrAMINE 25 milliGRAM(s) Oral every 6 hours PRN Rash and/or Itching  OLANZapine 5 milliGRAM(s) Oral every 12 hours PRN agitation

## 2021-12-09 NOTE — BH INPATIENT PSYCHIATRY PROGRESS NOTE - NSBHMSEAFFCONG_PSY_A_CORE
Unable to assess
Non-congruent
Unable to assess
Unable to assess
Non-congruent

## 2021-12-10 PROCEDURE — 99231 SBSQ HOSP IP/OBS SF/LOW 25: CPT

## 2021-12-10 RX ADMIN — OLANZAPINE 20 MILLIGRAM(S): 15 TABLET, FILM COATED ORAL at 23:24

## 2021-12-10 RX ADMIN — Medication 650 MILLIGRAM(S): at 14:57

## 2021-12-10 RX ADMIN — Medication 1 MILLIGRAM(S): at 14:56

## 2021-12-10 RX ADMIN — Medication 1 MILLIGRAM(S): at 23:24

## 2021-12-10 NOTE — BH INPATIENT PSYCHIATRY PROGRESS NOTE - NSBHMETABOLIC_PSY_ALL_CORE_FT
BMI: BMI (kg/m2): 45.1 (11-29-21 @ 18:40)  HbA1c: A1C with Estimated Average Glucose Result: 4.8 % (05-07-21 @ 10:10)    Glucose:   BP: 125/91 (12-09-21 @ 17:00) (125/91 - 141/98)  Lipid Panel: Date/Time: 05-07-21 @ 10:10  Cholesterol, Serum: 158  Direct LDL: --  HDL Cholesterol, Serum: 49  Total Cholesterol/HDL Ration Measurement: --  Triglycerides, Serum: 100

## 2021-12-10 NOTE — BH INPATIENT PSYCHIATRY PROGRESS NOTE - NSBHCHARTREVIEWVS_PSY_A_CORE FT
Vital Signs Last 24 Hrs  T(C): 36.8 (12-09-21 @ 17:00), Max: 36.8 (12-09-21 @ 17:00)  T(F): 98.2 (12-09-21 @ 17:00), Max: 98.2 (12-09-21 @ 17:00)  HR: 101 (12-09-21 @ 17:00) (101 - 101)  BP: 125/91 (12-09-21 @ 17:00) (125/91 - 125/91)  BP(mean): --  RR: 18 (12-09-21 @ 17:00) (18 - 18)  SpO2: 98% (12-09-21 @ 17:00) (98% - 98%)

## 2021-12-10 NOTE — BH INPATIENT PSYCHIATRY PROGRESS NOTE - NSBHASSESSSUMMFT_PSY_ALL_CORE
Assessment Summary	25 years old transferred from [facility]; believed to be undomiciled;  and gives unreliable history denying pst admissions but has VH and talks about breaking up with her boyfriend "The Devil" laughs inappropriately; uses Cannabis daily and cigarettes daily.     ;;11/30: appears acutely psychosis with VH suggesting substance use is a contributing factor but also inappropriate affect and disorganized and delusional lthinking suggestion chronic psychosis.  Titrate upwards on Zyprexa; NRT; judicious use of Ativan for anxiety.  ;;12/01: continues disorganized; guarded; Evidence for thought blocking and some delayed  response latencies.  no s/h I/i/p but guarded about AH or VH.  Sleep  appetite ok; no pain issues.  Continue Prolixin titration for psychosis    ;;12/02: continue isolative; tapering Ativan and raising Zyprexa at night.  guarded but denies SI or AH.   ;;12/03: continues isolative with poor adls; med seeking; guarded; does not talk about sxs; received 5mg Zyprexa; Zyprexa being titrated upwards  ;;12/06: had spat on staff and had stopped taking pm Zyprexa;  ;;12/07: compliant with pm Zyprexa but isolative.  Stays in bed. Not endorsing  suicidal or homicidal ideation intent or plans; no mention of auditory or visual hallucinations but guarded and suspicious.   ;;12/08: accepting Zyprexa will continue titration; tends to stay in bed in the am; no new behavioral issues.  ;;12/09: some group attendance with participation.   12/10 pt in room throughout the day sleeping    Current medications acetaminophen     Tablet .. 650 milliGRAM(s) Oral once PRN  aluminum hydroxide/magnesium hydroxide/simethicone Suspension 30 milliLiter(s) Oral every 4 hours PRN  diphenhydrAMINE 25 milliGRAM(s) Oral every 6 hours PRN  LORazepam     Tablet 1 milliGRAM(s) Oral <User Schedule>  nicotine - 21 mG/24Hr(s) Patch 1 patch Transdermal daily  OLANZapine 5 milliGRAM(s) Oral every 12 hours PRN  OLANZapine 15 milliGRAM(s) Oral at bedtime; raise to 20mg at night for psychosis and mood.

## 2021-12-10 NOTE — BH INPATIENT PSYCHIATRY PROGRESS NOTE - NSBHFUPINTERVALHXFT_PSY_A_CORE
Patient seen in her room, declines to speak with writer stating 'I'm sleeping, I'm sleeping'  Review of MAR indicates that pt did take her zyprexa 20mg and Ativan 1mg last night.

## 2021-12-11 RX ADMIN — Medication 1 MILLIGRAM(S): at 21:39

## 2021-12-11 RX ADMIN — OLANZAPINE 20 MILLIGRAM(S): 15 TABLET, FILM COATED ORAL at 21:38

## 2021-12-11 RX ADMIN — Medication 1 MILLIGRAM(S): at 12:48

## 2021-12-12 RX ADMIN — Medication 1 MILLIGRAM(S): at 22:23

## 2021-12-12 RX ADMIN — OLANZAPINE 20 MILLIGRAM(S): 15 TABLET, FILM COATED ORAL at 22:23

## 2021-12-12 RX ADMIN — Medication 1 MILLIGRAM(S): at 13:26

## 2021-12-13 PROCEDURE — 99232 SBSQ HOSP IP/OBS MODERATE 35: CPT

## 2021-12-13 RX ADMIN — Medication 650 MILLIGRAM(S): at 12:30

## 2021-12-13 RX ADMIN — Medication 1 MILLIGRAM(S): at 12:10

## 2021-12-13 NOTE — BH INPATIENT PSYCHIATRY PROGRESS NOTE - NSBHMETABOLIC_PSY_ALL_CORE_FT
BMI: BMI (kg/m2): 45.1 (11-29-21 @ 18:40)  HbA1c: A1C with Estimated Average Glucose Result: 4.8 % (05-07-21 @ 10:10)    Glucose:   BP: --  Lipid Panel: Date/Time: 05-07-21 @ 10:10  Cholesterol, Serum: 158  Direct LDL: --  HDL Cholesterol, Serum: 49  Total Cholesterol/HDL Ration Measurement: --  Triglycerides, Serum: 100

## 2021-12-13 NOTE — BH INPATIENT PSYCHIATRY PROGRESS NOTE - PRN MEDS
MEDICATIONS  (PRN):  aluminum hydroxide/magnesium hydroxide/simethicone Suspension 30 milliLiter(s) Oral every 4 hours PRN Dyspepsia  diphenhydrAMINE 25 milliGRAM(s) Oral every 6 hours PRN Rash and/or Itching  OLANZapine 5 milliGRAM(s) Oral every 12 hours PRN agitation

## 2021-12-13 NOTE — BH INPATIENT PSYCHIATRY PROGRESS NOTE - MSE UNSTRUCTURED FT
asleep in bed both in morning and afternoon Isolative; stays in bed; makes minimal eye contact; not conversational.  Alert; oriented; cognition intact; speech soft; no eye contact; superficial; Not endorsing  suicidal or homicidal ideation intent or plans; no mention of auditory or visual hallucinations but Evidence for thought blocking and long response latencies.

## 2021-12-13 NOTE — BH INPATIENT PSYCHIATRY PROGRESS NOTE - NSBHASSESSSUMMFT_PSY_ALL_CORE
Assessment Summary	25 years old transferred from [facility]; believed to be undomiciled;  and gives unreliable history denying pst admissions but has VH and talks about breaking up with her boyfriend "The Devil" laughs inappropriately; uses Cannabis daily and cigarettes daily.     ;;11/30: appears acutely psychosis with VH suggesting substance use is a contributing factor but also inappropriate affect and disorganized and delusional lthinking suggestion chronic psychosis.  Titrate upwards on Zyprexa; NRT; judicious use of Ativan for anxiety.  ;;12/01: continues disorganized; guarded; Evidence for thought blocking and some delayed  response latencies.  no s/h I/i/p but guarded about AH or VH.  Sleep  appetite ok; no pain issues.  Continue Prolixin titration for psychosis    ;;12/02: continue isolative; tapering Ativan and raising Zyprexa at night.  guarded but denies SI or AH.   ;;12/03: continues isolative with poor adls; med seeking; guarded; does not talk about sxs; received 5mg Zyprexa; Zyprexa being titrated upwards  ;;12/06: had spat on staff and had stopped taking pm Zyprexa;  ;;12/07: compliant with pm Zyprexa but isolative.  Stays in bed. Not endorsing  suicidal or homicidal ideation intent or plans; no mention of auditory or visual hallucinations but guarded and suspicious.   ;;12/08: accepting Zyprexa will continue titration; tends to stay in bed in the am; no new behavioral issues.  ;;12/09: some group attendance with participation.   ;;12/13: minimal improvement in socialization and adls; would continue Zyprexa titration.     Current medications aluminum hydroxide/magnesium hydroxide/simethicone Suspension 30 milliLiter(s) Oral every 4 hours PRN  diphenhydrAMINE 25 milliGRAM(s) Oral every 6 hours PRN  LORazepam     Tablet 1 milliGRAM(s) Oral <User Schedule>  nicotine - 21 mG/24Hr(s) Patch 1 patch Transdermal daily  OLANZapine 5 milliGRAM(s) Oral every 12 hours PRN  OLANZapine 20 milliGRAM(s) Oral at bedtime; to consider raising to 25mg at bedtime; consider switching

## 2021-12-13 NOTE — BH INPATIENT PSYCHIATRY PROGRESS NOTE - NSBHFUPINTERVALHXFT_PSY_A_CORE
More verbal  "I missed you." ; still avoids eye contact; in bed in am; fair adls; limited socialization.

## 2021-12-13 NOTE — BH INPATIENT PSYCHIATRY PROGRESS NOTE - CURRENT MEDICATION
MEDICATIONS  (STANDING):  LORazepam     Tablet 1 milliGRAM(s) Oral <User Schedule>  nicotine - 21 mG/24Hr(s) Patch 1 patch Transdermal daily  OLANZapine 20 milliGRAM(s) Oral at bedtime    MEDICATIONS  (PRN):  aluminum hydroxide/magnesium hydroxide/simethicone Suspension 30 milliLiter(s) Oral every 4 hours PRN Dyspepsia  diphenhydrAMINE 25 milliGRAM(s) Oral every 6 hours PRN Rash and/or Itching  OLANZapine 5 milliGRAM(s) Oral every 12 hours PRN agitation

## 2021-12-14 LAB — SARS-COV-2 RNA SPEC QL NAA+PROBE: SIGNIFICANT CHANGE UP

## 2021-12-14 PROCEDURE — 99232 SBSQ HOSP IP/OBS MODERATE 35: CPT

## 2021-12-14 RX ADMIN — OLANZAPINE 20 MILLIGRAM(S): 15 TABLET, FILM COATED ORAL at 22:17

## 2021-12-14 NOTE — BH INPATIENT PSYCHIATRY PROGRESS NOTE - CURRENT MEDICATION
MEDICATIONS  (STANDING):  nicotine - 21 mG/24Hr(s) Patch 1 patch Transdermal daily  OLANZapine 20 milliGRAM(s) Oral at bedtime    MEDICATIONS  (PRN):  aluminum hydroxide/magnesium hydroxide/simethicone Suspension 30 milliLiter(s) Oral every 4 hours PRN Dyspepsia  diphenhydrAMINE 25 milliGRAM(s) Oral every 6 hours PRN Rash and/or Itching  LORazepam     Tablet 1 milliGRAM(s) Oral every 8 hours PRN anxiety  OLANZapine 5 milliGRAM(s) Oral every 12 hours PRN agitation

## 2021-12-14 NOTE — BH INPATIENT PSYCHIATRY PROGRESS NOTE - NSBHFUPINTERVALHXFT_PSY_A_CORE
"I didn't take Zyprexa last night because I was asleep"  Staff reports some improvement in lability and irritability; however still rather isolative and blocked.  In bed this am.  Alert; oriented; cognition intact; speech clear.

## 2021-12-14 NOTE — BH INPATIENT PSYCHIATRY PROGRESS NOTE - PRN MEDS
MEDICATIONS  (PRN):  aluminum hydroxide/magnesium hydroxide/simethicone Suspension 30 milliLiter(s) Oral every 4 hours PRN Dyspepsia  diphenhydrAMINE 25 milliGRAM(s) Oral every 6 hours PRN Rash and/or Itching  LORazepam     Tablet 1 milliGRAM(s) Oral every 8 hours PRN anxiety  OLANZapine 5 milliGRAM(s) Oral every 12 hours PRN agitation

## 2021-12-14 NOTE — BH INPATIENT PSYCHIATRY PROGRESS NOTE - MSE UNSTRUCTURED FT
Isolative; stays in bed; makes minimal eye contact; not conversational.  Alert; oriented; cognition intact; speech soft; no eye contact; superficial; Not endorsing  suicidal or homicidal ideation intent or plans; no mention of auditory or visual hallucinations but Evidence for thought blocking and long response latencies.

## 2021-12-14 NOTE — BH INPATIENT PSYCHIATRY PROGRESS NOTE - NSBHASSESSSUMMFT_PSY_ALL_CORE
Assessment Summary	25 years old transferred from [facility]; believed to be undomiciled;  and gives unreliable history denying pst admissions but has VH and talks about breaking up with her boyfriend "The Devil" laughs inappropriately; uses Cannabis daily and cigarettes daily.     ;;11/30: appears acutely psychosis with VH suggesting substance use is a contributing factor but also inappropriate affect and disorganized and delusional lthinking suggestion chronic psychosis.  Titrate upwards on Zyprexa; NRT; judicious use of Ativan for anxiety.  ;;12/01: continues disorganized; guarded; Evidence for thought blocking and some delayed  response latencies.  no s/h I/i/p but guarded about AH or VH.  Sleep  appetite ok; no pain issues.  Continue Prolixin titration for psychosis    ;;12/02: continue isolative; tapering Ativan and raising Zyprexa at night.  guarded but denies SI or AH.   ;;12/03: continues isolative with poor adls; med seeking; guarded; does not talk about sxs; received 5mg Zyprexa; Zyprexa being titrated upwards  ;;12/06: had spat on staff and had stopped taking pm Zyprexa;  ;;12/07: compliant with pm Zyprexa but isolative.  Stays in bed. Not endorsing  suicidal or homicidal ideation intent or plans; no mention of auditory or visual hallucinations but guarded and suspicious.   ;;12/08: accepting Zyprexa will continue titration; tends to stay in bed in the am; no new behavioral issues.  ;;12/09: some group attendance with participation.   ;;12/13: minimal improvement in socialization and adls; would continue Zyprexa titration.   ;;12/14: minimal improvement; may need to review regime for Zyprexa in view of pm erratic compliance; consider alternative antipsychotic if progress is limited.     Current medications aluminum hydroxide/magnesium hydroxide/simethicone Suspension 30 milliLiter(s) Oral every 4 hours PRN  diphenhydrAMINE 25 milliGRAM(s) Oral every 6 hours PRN  LORazepam     Tablet 1 milliGRAM(s) Oral <User Schedule>  nicotine - 21 mG/24Hr(s) Patch 1 patch Transdermal daily  OLANZapine 5 milliGRAM(s) Oral every 12 hours PRN  OLANZapine 20 milliGRAM(s) Oral at bedtime; to consider raising to 25mg at bedtime; consider switching

## 2021-12-14 NOTE — BH INPATIENT PSYCHIATRY PROGRESS NOTE - NSTXPSYCHOGOALOTHER_PSY_ALL_CORE
pt. will continue to participate in miliu structure of the unit. pt. will continue to participate in milieu structure of the unit.

## 2021-12-15 PROCEDURE — 99232 SBSQ HOSP IP/OBS MODERATE 35: CPT

## 2021-12-15 RX ADMIN — OLANZAPINE 20 MILLIGRAM(S): 15 TABLET, FILM COATED ORAL at 21:30

## 2021-12-15 RX ADMIN — Medication 1 MILLIGRAM(S): at 21:30

## 2021-12-15 NOTE — BH INPATIENT PSYCHIATRY PROGRESS NOTE - NSBHASSESSSUMMFT_PSY_ALL_CORE
Assessment Summary	25 years old transferred from [facility]; believed to be undomiciled;  and gives unreliable history denying pst admissions but has VH and talks about breaking up with her boyfriend "The Devil" laughs inappropriately; uses Cannabis daily and cigarettes daily.     ;;11/30: appears acutely psychosis with VH suggesting substance use is a contributing factor but also inappropriate affect and disorganized and delusional lthinking suggestion chronic psychosis.  Titrate upwards on Zyprexa; NRT; judicious use of Ativan for anxiety.  ;;12/01: continues disorganized; guarded; Evidence for thought blocking and some delayed  response latencies.  no s/h I/i/p but guarded about AH or VH.  Sleep  appetite ok; no pain issues.  Continue Prolixin titration for psychosis    ;;12/02: continue isolative; tapering Ativan and raising Zyprexa at night.  guarded but denies SI or AH.   ;;12/03: continues isolative with poor adls; med seeking; guarded; does not talk about sxs; received 5mg Zyprexa; Zyprexa being titrated upwards  ;;12/06: had spat on staff and had stopped taking pm Zyprexa;  ;;12/07: compliant with pm Zyprexa but isolative.  Stays in bed. Not endorsing  suicidal or homicidal ideation intent or plans; no mention of auditory or visual hallucinations but guarded and suspicious.   ;;12/08: accepting Zyprexa will continue titration; tends to stay in bed in the am; no new behavioral issues.  ;;12/09: some group attendance with participation.   ;;12/13: minimal improvement in socialization and adls; would continue Zyprexa titration.   ;;12/14: minimal improvement; may need to review regime for Zyprexa in view of pm erratic compliance; consider alternative antipsychotic if progress is limited.   ;;12/15: encouraging better ADLs;  considering CROCKER; likely a different antipsychotic.     Current medications aluminum hydroxide/magnesium hydroxide/simethicone Suspension 30 milliLiter(s) Oral every 4 hours PRN  diphenhydrAMINE 25 milliGRAM(s) Oral every 6 hours PRN  LORazepam     Tablet 1 milliGRAM(s) Oral <User Schedule>  nicotine - 21 mG/24Hr(s) Patch 1 patch Transdermal daily  OLANZapine 5 milliGRAM(s) Oral every 12 hours PRN  OLANZapine 20 milliGRAM(s) Oral at bedtime; to consider raising to 25mg at bedtime; consider switching

## 2021-12-15 NOTE — BH INPATIENT PSYCHIATRY PROGRESS NOTE - NSBHFUPINTERVALHXFT_PSY_A_CORE
"I am going to shower today"  ; ACT team suggests use of CROCKER; patient more visible after morning.  Isolative; stays in bed; makes minimal eye contact; not conversational.  Alert; oriented; cognition intact; speech clear; no tremor or evidence of movement impairment.

## 2021-12-16 PROCEDURE — 99232 SBSQ HOSP IP/OBS MODERATE 35: CPT

## 2021-12-16 NOTE — BH INPATIENT PSYCHIATRY PROGRESS NOTE - NSBHASSESSSUMMFT_PSY_ALL_CORE
Assessment Summary	25 years old transferred from [facility]; believed to be undomiciled;  and gives unreliable history denying pst admissions but has VH and talks about breaking up with her boyfriend "The Devil" laughs inappropriately; uses Cannabis daily and cigarettes daily.     ;;11/30: appears acutely psychosis with VH suggesting substance use is a contributing factor but also inappropriate affect and disorganized and delusional lthinking suggestion chronic psychosis.  Titrate upwards on Zyprexa; NRT; judicious use of Ativan for anxiety.  ;;12/01: continues disorganized; guarded; Evidence for thought blocking and some delayed  response latencies.  no s/h I/i/p but guarded about AH or VH.  Sleep  appetite ok; no pain issues.  Continue Prolixin titration for psychosis    ;;12/02: continue isolative; tapering Ativan and raising Zyprexa at night.  guarded but denies SI or AH.   ;;12/03: continues isolative with poor adls; med seeking; guarded; does not talk about sxs; received 5mg Zyprexa; Zyprexa being titrated upwards  ;;12/06: had spat on staff and had stopped taking pm Zyprexa;  ;;12/07: compliant with pm Zyprexa but isolative.  Stays in bed. Not endorsing  suicidal or homicidal ideation intent or plans; no mention of auditory or visual hallucinations but guarded and suspicious.   ;;12/08: accepting Zyprexa will continue titration; tends to stay in bed in the am; no new behavioral issues.  ;;12/09: some group attendance with participation.   ;;12/13: minimal improvement in socialization and adls; would continue Zyprexa titration.   ;;12/14: minimal improvement; may need to review regime for Zyprexa in view of pm erratic compliance; consider alternative antipsychotic if progress is limited.   ;;12/15: encouraging better ADLs;  considering CROCKER; likely a different antipsychotic.   ;;12/16: given drowsiness and withdrawl will need to continue loweiing Lorazepam and may need to introduce Prolxin with transition to CROCKER for positive psychotic sxs; being assessed for paranoid thinking which may be a facor in social avoidance.     Current medications aluminum hydroxide/magnesium hydroxide/simethicone Suspension 30 milliLiter(s) Oral every 4 hours PRN  diphenhydrAMINE 25 milliGRAM(s) Oral every 6 hours PRN  LORazepam     Tablet 1 milliGRAM(s) Oral <User Schedule>  nicotine - 21 mG/24Hr(s) Patch 1 patch Transdermal daily  OLANZapine 5 milliGRAM(s) Oral every 12 hours PRN  OLANZapine 20 milliGRAM(s) Oral at bedtime; to consider raising to 25mg at bedtime; consider switching

## 2021-12-17 PROCEDURE — 99232 SBSQ HOSP IP/OBS MODERATE 35: CPT

## 2021-12-17 RX ADMIN — Medication 1 MILLIGRAM(S): at 11:12

## 2021-12-17 RX ADMIN — Medication 1 MILLIGRAM(S): at 19:14

## 2021-12-17 RX ADMIN — OLANZAPINE 20 MILLIGRAM(S): 15 TABLET, FILM COATED ORAL at 21:55

## 2021-12-17 NOTE — BH INPATIENT PSYCHIATRY PROGRESS NOTE - NSBHASSESSSUMMFT_PSY_ALL_CORE
Assessment Summary	25 years old transferred from [facility]; believed to be undomiciled;  and gives unreliable history denying pst admissions but has VH and talks about breaking up with her boyfriend "The Devil" laughs inappropriately; uses Cannabis daily and cigarettes daily.     ;;11/30: appears acutely psychosis with VH suggesting substance use is a contributing factor but also inappropriate affect and disorganized and delusional lthinking suggestion chronic psychosis.  Titrate upwards on Zyprexa; NRT; judicious use of Ativan for anxiety.  ;;12/01: continues disorganized; guarded; Evidence for thought blocking and some delayed  response latencies.  no s/h I/i/p but guarded about AH or VH.  Sleep  appetite ok; no pain issues.  Continue Prolixin titration for psychosis    ;;12/02: continue isolative; tapering Ativan and raising Zyprexa at night.  guarded but denies SI or AH.   ;;12/03: continues isolative with poor adls; med seeking; guarded; does not talk about sxs; received 5mg Zyprexa; Zyprexa being titrated upwards  ;;12/06: had spat on staff and had stopped taking pm Zyprexa;  ;;12/07: compliant with pm Zyprexa but isolative.  Stays in bed. Not endorsing  suicidal or homicidal ideation intent or plans; no mention of auditory or visual hallucinations but guarded and suspicious.   ;;12/08: accepting Zyprexa will continue titration; tends to stay in bed in the am; no new behavioral issues.  ;;12/09: some group attendance with participation.   ;;12/13: minimal improvement in socialization and adls; would continue Zyprexa titration.   ;;12/14: minimal improvement; may need to review regime for Zyprexa in view of pm erratic compliance; consider alternative antipsychotic if progress is limited.   ;;12/15: encouraging better ADLs;  considering CROCKER; likely a different antipsychotic.   ;;12/16: given drowsiness and withdrawl will need to continue loweiing Lorazepam and may need to introduce Prolxin with transition to CROCKER for positive psychotic sxs; being assessed for paranoid thinking which may be a facor in social avoidance.   ;;12/17: rejects Prolixin but compliant with Zyprexa; more visible today and mood appears to be improving with no mention of AH or VH.   Still guarded.     Current medications aluminum hydroxide/magnesium hydroxide/simethicone Suspension 30 milliLiter(s) Oral every 4 hours PRN  diphenhydrAMINE 25 milliGRAM(s) Oral every 6 hours PRN  LORazepam     Tablet 1 milliGRAM(s) Oral <User Schedule>  nicotine - 21 mG/24Hr(s) Patch 1 patch Transdermal daily  OLANZapine 5 milliGRAM(s) Oral every 12 hours PRN  OLANZapine 20 milliGRAM(s) Oral at bedtime; to consider raising to 25mg at bedtime; consider switching

## 2021-12-17 NOTE — BH INPATIENT PSYCHIATRY PROGRESS NOTE - NSBHFUPINTERVALHXFT_PSY_A_CORE
in am declined adding Prolxin; however in afternoon was visible and cheerful and with improved mood and adls.  Not endorsing  suicidal or homicidal ideation intent or plans; no mention of auditory or visual hallucinations but still somewhat guarded and blocked.

## 2021-12-18 RX ADMIN — Medication 1 MILLIGRAM(S): at 19:23

## 2021-12-18 RX ADMIN — Medication 1 PATCH: at 20:24

## 2021-12-18 RX ADMIN — OLANZAPINE 20 MILLIGRAM(S): 15 TABLET, FILM COATED ORAL at 21:50

## 2021-12-18 RX ADMIN — Medication 1 PATCH: at 15:42

## 2021-12-19 RX ADMIN — Medication 1 MILLIGRAM(S): at 18:03

## 2021-12-19 RX ADMIN — Medication 1 PATCH: at 09:54

## 2021-12-19 RX ADMIN — OLANZAPINE 20 MILLIGRAM(S): 15 TABLET, FILM COATED ORAL at 21:19

## 2021-12-19 RX ADMIN — Medication 25 MILLIGRAM(S): at 21:22

## 2021-12-19 RX ADMIN — OLANZAPINE 5 MILLIGRAM(S): 15 TABLET, FILM COATED ORAL at 11:09

## 2021-12-19 RX ADMIN — Medication 1 PATCH: at 10:43

## 2021-12-19 RX ADMIN — Medication 1 MILLIGRAM(S): at 09:54

## 2021-12-19 RX ADMIN — Medication 1 PATCH: at 19:54

## 2021-12-19 RX ADMIN — Medication 1 PATCH: at 07:32

## 2021-12-20 PROCEDURE — 99232 SBSQ HOSP IP/OBS MODERATE 35: CPT

## 2021-12-20 RX ADMIN — Medication 1 MILLIGRAM(S): at 16:44

## 2021-12-20 RX ADMIN — OLANZAPINE 20 MILLIGRAM(S): 15 TABLET, FILM COATED ORAL at 21:49

## 2021-12-20 RX ADMIN — Medication 1 PATCH: at 07:13

## 2021-12-20 NOTE — BH INPATIENT PSYCHIATRY PROGRESS NOTE - NSBHCHARTREVIEWVS_PSY_A_CORE FT
Vital Signs Last 24 Hrs  T(C): 36.4 (12-19-21 @ 10:00), Max: 36.4 (12-19-21 @ 10:00)  T(F): 97.5 (12-19-21 @ 10:00), Max: 97.5 (12-19-21 @ 10:00)  HR: 106 (12-19-21 @ 10:00) (106 - 106)  BP: 137/81 (12-19-21 @ 10:00) (137/81 - 137/81)  BP(mean): --  RR: 18 (12-19-21 @ 10:00) (18 - 18)  SpO2: 100% (12-19-21 @ 10:00) (100% - 100%)     Vital Signs Last 24 Hrs  T(C): --  T(F): --  HR: --  BP: --  BP(mean): --  RR: --  SpO2: --

## 2021-12-20 NOTE — BH INPATIENT PSYCHIATRY PROGRESS NOTE - NSBHFUPINTERVALHXFT_PSY_A_CORE
more conversational in the morning; attended and participated in groups.  Not endorsing  suicidal or homicidal ideation intent or plans; no mention of auditory or visual hallucinations

## 2021-12-20 NOTE — BH TREATMENT PLAN - NSCMSPTSTRENGTHS_PSY_ALL_CORE
Expressive of emotions/Humor/Positive attitude/Resourceful/Self confidence
Expressive of emotions/Humor/Positive attitude/Resourceful/Self confidence
Assertive/Physically healthy/Self confidence

## 2021-12-20 NOTE — BH INPATIENT PSYCHIATRY PROGRESS NOTE - NSBHMETABOLIC_PSY_ALL_CORE_FT
BMI: BMI (kg/m2): 45.1 (11-29-21 @ 18:40)  HbA1c: A1C with Estimated Average Glucose Result: 4.8 % (05-07-21 @ 10:10)    Glucose:   BP: 137/81 (12-19-21 @ 10:00) (121/91 - 137/81)  Lipid Panel: Date/Time: 05-07-21 @ 10:10  Cholesterol, Serum: 158  Direct LDL: --  HDL Cholesterol, Serum: 49  Total Cholesterol/HDL Ration Measurement: --  Triglycerides, Serum: 100

## 2021-12-20 NOTE — BH INPATIENT PSYCHIATRY PROGRESS NOTE - NSBHASSESSSUMMFT_PSY_ALL_CORE
Assessment Summary	25 years old transferred from [facility]; believed to be undomiciled;  and gives unreliable history denying pst admissions but has VH and talks about breaking up with her boyfriend "The Devil" laughs inappropriately; uses Cannabis daily and cigarettes daily.     ;;11/30: appears acutely psychosis with VH suggesting substance use is a contributing factor but also inappropriate affect and disorganized and delusional lthinking suggestion chronic psychosis.  Titrate upwards on Zyprexa; NRT; judicious use of Ativan for anxiety.  ;;12/01: continues disorganized; guarded; Evidence for thought blocking and some delayed  response latencies.  no s/h I/i/p but guarded about AH or VH.  Sleep  appetite ok; no pain issues.  Continue Prolixin titration for psychosis    ;;12/02: continue isolative; tapering Ativan and raising Zyprexa at night.  guarded but denies SI or AH.   ;;12/03: continues isolative with poor adls; med seeking; guarded; does not talk about sxs; received 5mg Zyprexa; Zyprexa being titrated upwards  ;;12/06: had spat on staff and had stopped taking pm Zyprexa;  ;;12/07: compliant with pm Zyprexa but isolative.  Stays in bed. Not endorsing  suicidal or homicidal ideation intent or plans; no mention of auditory or visual hallucinations but guarded and suspicious.   ;;12/08: accepting Zyprexa will continue titration; tends to stay in bed in the am; no new behavioral issues.  ;;12/09: some group attendance with participation.   ;;12/13: minimal improvement in socialization and adls; would continue Zyprexa titration.   ;;12/14: minimal improvement; may need to review regime for Zyprexa in view of pm erratic compliance; consider alternative antipsychotic if progress is limited.   ;;12/15: encouraging better ADLs;  considering CROCKER; likely a different antipsychotic.   ;;12/16: given drowsiness and withdrawl will need to continue loweiing Lorazepam and may need to introduce Prolxin with transition to CROCKER for positive psychotic sxs; being assessed for paranoid thinking which may be a facor in social avoidance.   ;;12/17: rejects Prolixin but compliant with Zyprexa; more visible today and mood appears to be improving with no mention of AH or VH.   Still guarded.   ;;12/20: more visible ; better adls; less drowsy in am; attends and partiipates in groups; Not endorsing  suicidal or homicidal ideation intent or plans; no mention of auditory or visual hallucinations     Current medications aluminum hydroxide/magnesium hydroxide/simethicone Suspension 30 milliLiter(s) Oral every 4 hours PRN  diphenhydrAMINE 25 milliGRAM(s) Oral every 6 hours PRN  LORazepam     Tablet 1 milliGRAM(s) Oral <User Schedule>  nicotine - 21 mG/24Hr(s) Patch 1 patch Transdermal daily  OLANZapine 5 milliGRAM(s) Oral every 12 hours PRN  OLANZapine 20 milliGRAM(s) Oral at bedtime; to consider raising to 25mg at bedtime; consider switching

## 2021-12-21 PROCEDURE — 99232 SBSQ HOSP IP/OBS MODERATE 35: CPT

## 2021-12-21 NOTE — BH INPATIENT PSYCHIATRY PROGRESS NOTE - NSBHASSESSSUMMFT_PSY_ALL_CORE
Assessment Summary	25 years old transferred from [facility]; believed to be undomiciled;  and gives unreliable history denying pst admissions but has VH and talks about breaking up with her boyfriend "The Devil" laughs inappropriately; uses Cannabis daily and cigarettes daily.     ;;11/30: appears acutely psychosis with VH suggesting substance use is a contributing factor but also inappropriate affect and disorganized and delusional lthinking suggestion chronic psychosis.  Titrate upwards on Zyprexa; NRT; judicious use of Ativan for anxiety.  ;;12/01: continues disorganized; guarded; Evidence for thought blocking and some delayed  response latencies.  no s/h I/i/p but guarded about AH or VH.  Sleep  appetite ok; no pain issues.  Continue Prolixin titration for psychosis    ;;12/02: continue isolative; tapering Ativan and raising Zyprexa at night.  guarded but denies SI or AH.   ;;12/03: continues isolative with poor adls; med seeking; guarded; does not talk about sxs; received 5mg Zyprexa; Zyprexa being titrated upwards  ;;12/06: had spat on staff and had stopped taking pm Zyprexa;  ;;12/07: compliant with pm Zyprexa but isolative.  Stays in bed. Not endorsing  suicidal or homicidal ideation intent or plans; no mention of auditory or visual hallucinations but guarded and suspicious.   ;;12/08: accepting Zyprexa will continue titration; tends to stay in bed in the am; no new behavioral issues.  ;;12/09: some group attendance with participation.   ;;12/13: minimal improvement in socialization and adls; would continue Zyprexa titration.   ;;12/14: minimal improvement; may need to review regime for Zyprexa in view of pm erratic compliance; consider alternative antipsychotic if progress is limited.   ;;12/15: encouraging better ADLs;  considering CROCKER; likely a different antipsychotic.   ;;12/16: given drowsiness and withdrawal will need to continue lowering Lorazepam and may need to introduce Prolxin with transition to CROCKER for positive psychotic sxs; being assessed for paranoid thinking which may be a facor in social avoidance.   ;;12/17: rejects Prolixin but compliant with Zyprexa; more visible today and mood appears to be improving with no mention of AH or VH.   Still guarded.   ;;12/20: more visible ; better adls; less drowsy in am; attends and partiipates in groups; Not endorsing  suicidal or homicidal ideation intent or plans; no mention of auditory or visual hallucinations ;;12/21: similar to yesterday; more responsive and more group attendance and improving adls; need to start considering aftercare needs.     Current medications aluminum hydroxide/magnesium hydroxide/simethicone Suspension 30 milliLiter(s) Oral every 4 hours PRN  diphenhydrAMINE 25 milliGRAM(s) Oral every 6 hours PRN  LORazepam     Tablet 1 milliGRAM(s) Oral <User Schedule>  nicotine - 21 mG/24Hr(s) Patch 1 patch Transdermal daily  OLANZapine 5 milliGRAM(s) Oral every 12 hours PRN  OLANZapine 20 milliGRAM(s) Oral at bedtime; to consider raising to 25mg at bedtime; consider switching

## 2021-12-21 NOTE — BH INPATIENT PSYCHIATRY PROGRESS NOTE - PRN MEDS
MEDICATIONS  (PRN):  aluminum hydroxide/magnesium hydroxide/simethicone Suspension 30 milliLiter(s) Oral every 4 hours PRN Dyspepsia  diphenhydrAMINE 25 milliGRAM(s) Oral every 6 hours PRN Rash and/or Itching  LORazepam     Tablet 1 milliGRAM(s) Oral every 12 hours PRN anxiety  OLANZapine 5 milliGRAM(s) Oral every 12 hours PRN agitation

## 2021-12-21 NOTE — BH INPATIENT PSYCHIATRY PROGRESS NOTE - CURRENT MEDICATION
MEDICATIONS  (STANDING):  nicotine - 21 mG/24Hr(s) Patch 1 patch Transdermal daily  OLANZapine 20 milliGRAM(s) Oral at bedtime    MEDICATIONS  (PRN):  aluminum hydroxide/magnesium hydroxide/simethicone Suspension 30 milliLiter(s) Oral every 4 hours PRN Dyspepsia  diphenhydrAMINE 25 milliGRAM(s) Oral every 6 hours PRN Rash and/or Itching  LORazepam     Tablet 1 milliGRAM(s) Oral every 12 hours PRN anxiety  OLANZapine 5 milliGRAM(s) Oral every 12 hours PRN agitation

## 2021-12-21 NOTE — CHART NOTE - NSCHARTNOTEFT_GEN_A_CORE
[facility]  PHYSICAL EXAM: Agree/Declined    VITALS: T(C): --  HR: --  BP: --  RR: --  SpO2: --      GENERAL: NAD, comfortable, ambulating  HEAD:  Atraumatic, Normocephalic  EYES: EOMI, PERRLA, conjunctiva and sclera clear  ENT: Moist mucous membranes  NECK: Supple, No JVD  CHEST/LUNG: Clear to auscultation bilaterally; No rales, rhonchi, wheezing, or rubs. Unlabored respirations  HEART: Regular rate and rhythm; No murmurs, rubs, or gallops  ABDOMEN: BSx4; Soft, nontender, nondistended  EXTREMITIES:  No clubbing, cyanosis, or edema  NERVOUS SYSTEM:  A&Ox3, no focal deficits   SKIN: No rashes or lesions

## 2021-12-22 PROCEDURE — 99232 SBSQ HOSP IP/OBS MODERATE 35: CPT

## 2021-12-22 RX ADMIN — OLANZAPINE 20 MILLIGRAM(S): 15 TABLET, FILM COATED ORAL at 21:22

## 2021-12-22 RX ADMIN — OLANZAPINE 5 MILLIGRAM(S): 15 TABLET, FILM COATED ORAL at 11:34

## 2021-12-22 RX ADMIN — Medication 1 MILLIGRAM(S): at 13:08

## 2021-12-22 NOTE — BH INPATIENT PSYCHIATRY PROGRESS NOTE - NSBHASSESSSUMMFT_PSY_ALL_CORE
Assessment Summary	25 years old transferred from [facility]; believed to be undomiciled;  and gives unreliable history denying pst admissions but has VH and talks about breaking up with her boyfriend "The Devil" laughs inappropriately; uses Cannabis daily and cigarettes daily.     ;;11/30: appears acutely psychosis with VH suggesting substance use is a contributing factor but also inappropriate affect and disorganized and delusional lthinking suggestion chronic psychosis.  Titrate upwards on Zyprexa; NRT; judicious use of Ativan for anxiety.  ;;12/01: continues disorganized; guarded; Evidence for thought blocking and some delayed  response latencies.  no s/h I/i/p but guarded about AH or VH.  Sleep  appetite ok; no pain issues.  Continue Prolixin titration for psychosis    ;;12/02: continue isolative; tapering Ativan and raising Zyprexa at night.  guarded but denies SI or AH.   ;;12/03: continues isolative with poor adls; med seeking; guarded; does not talk about sxs; received 5mg Zyprexa; Zyprexa being titrated upwards  ;;12/06: had spat on staff and had stopped taking pm Zyprexa;  ;;12/07: compliant with pm Zyprexa but isolative.  Stays in bed. Not endorsing  suicidal or homicidal ideation intent or plans; no mention of auditory or visual hallucinations but guarded and suspicious.   ;;12/08: accepting Zyprexa will continue titration; tends to stay in bed in the am; no new behavioral issues.  ;;12/09: some group attendance with participation.   ;;12/13: minimal improvement in socialization and adls; would continue Zyprexa titration.   ;;12/14: minimal improvement; may need to review regime for Zyprexa in view of pm erratic compliance; consider alternative antipsychotic if progress is limited.   ;;12/15: encouraging better ADLs;  considering CROCKER; likely a different antipsychotic.   ;;12/16: given drowsiness and withdrawal will need to continue lowering Lorazepam and may need to introduce Prolxin with transition to CROCKER for positive psychotic sxs; being assessed for paranoid thinking which may be a facor in social avoidance.   ;;12/17: rejects Prolixin but compliant with Zyprexa; more visible today and mood appears to be improving with no mention of AH or VH.   Still guarded.   ;;12/20: more visible ; better adls; less drowsy in am; attends and partiipates in groups; Not endorsing  suicidal or homicidal ideation intent or plans; no mention of auditory or visual hallucinations ;;12/21: similar to yesterday; more responsive and more group attendance and improving adls; need to start considering aftercare needs.  ;;12/22: while improving spotty compliance is an issue; patient may need a practical CROCKER such as Prolixin.     Current medications aluminum hydroxide/magnesium hydroxide/simethicone Suspension 30 milliLiter(s) Oral every 4 hours PRN  diphenhydrAMINE 25 milliGRAM(s) Oral every 6 hours PRN  LORazepam     Tablet 1 milliGRAM(s) Oral <User Schedule>  nicotine - 21 mG/24Hr(s) Patch 1 patch Transdermal daily  OLANZapine 5 milliGRAM(s) Oral every 12 hours PRN  OLANZapine 20 milliGRAM(s) Oral at bedtime; to consider raising to 25mg at bedtime; consider switching

## 2021-12-22 NOTE — BH INPATIENT PSYCHIATRY PROGRESS NOTE - NSBHFUPINTERVALHXFT_PSY_A_CORE
more conversational in the morning; attended and participated in groups.  Not endorsing  suicidal or homicidal ideation intent or plans; no mention of auditory or visual hallucinations  ; however back to spotty compliance with Zyprexa.  Need to discuss this issue with a mind towards adding med with practical CROCKER.

## 2021-12-22 NOTE — BH INPATIENT PSYCHIATRY PROGRESS NOTE - NSBHMETABOLIC_PSY_ALL_CORE_FT
BMI: BMI (kg/m2): 45.1 (11-29-21 @ 18:40)  HbA1c: A1C with Estimated Average Glucose Result: 4.8 % (05-07-21 @ 10:10)    Glucose:   BP: 137/81 (12-19-21 @ 10:00) (137/81 - 137/81)  Lipid Panel: Date/Time: 05-07-21 @ 10:10  Cholesterol, Serum: 158  Direct LDL: --  HDL Cholesterol, Serum: 49  Total Cholesterol/HDL Ration Measurement: --  Triglycerides, Serum: 100   BMI: BMI (kg/m2): 45.1 (11-29-21 @ 18:40)  HbA1c: A1C with Estimated Average Glucose Result: 4.8 % (05-07-21 @ 10:10)    Glucose:   BP: --  Lipid Panel: Date/Time: 05-07-21 @ 10:10  Cholesterol, Serum: 158  Direct LDL: --  HDL Cholesterol, Serum: 49  Total Cholesterol/HDL Ration Measurement: --  Triglycerides, Serum: 100

## 2021-12-23 PROCEDURE — 99232 SBSQ HOSP IP/OBS MODERATE 35: CPT

## 2021-12-23 RX ADMIN — OLANZAPINE 20 MILLIGRAM(S): 15 TABLET, FILM COATED ORAL at 21:43

## 2021-12-23 RX ADMIN — Medication 1 MILLIGRAM(S): at 14:22

## 2021-12-23 NOTE — BH INPATIENT PSYCHIATRY PROGRESS NOTE - NSBHFUPINTERVALHXFT_PSY_A_CORE
asleep in am but up by noon; smiling states superficially she will attend some groups; no spontaneous mention of "Tenriism" or strange religiosity; Alert; oriented; cognition intact; speech clear; no tremor or evidence of movement impairment. i&J: poor: limited if any awareness of medications; guarded or minimally acknowledging sxs; not reflective on issues that impact on symptoms  Not endorsing  suicidal or homicidal ideation intent or plans; no mention of auditory or visual hallucinations

## 2021-12-23 NOTE — BH INPATIENT PSYCHIATRY PROGRESS NOTE - NSBHASSESSSUMMFT_PSY_ALL_CORE
Assessment Summary	25 years old transferred from [facility]; believed to be undomiciled;  and gives unreliable history denying pst admissions but has VH and talks about breaking up with her boyfriend "The Devil" laughs inappropriately; uses Cannabis daily and cigarettes daily.     ;;11/30: appears acutely psychosis with VH suggesting substance use is a contributing factor but also inappropriate affect and disorganized and delusional lthinking suggestion chronic psychosis.  Titrate upwards on Zyprexa; NRT; judicious use of Ativan for anxiety.  ;;12/01: continues disorganized; guarded; Evidence for thought blocking and some delayed  response latencies.  no s/h I/i/p but guarded about AH or VH.  Sleep  appetite ok; no pain issues.  Continue Prolixin titration for psychosis    ;;12/02: continue isolative; tapering Ativan and raising Zyprexa at night.  guarded but denies SI or AH.   ;;12/03: continues isolative with poor adls; med seeking; guarded; does not talk about sxs; received 5mg Zyprexa; Zyprexa being titrated upwards  ;;12/06: had spat on staff and had stopped taking pm Zyprexa;  ;;12/07: compliant with pm Zyprexa but isolative.  Stays in bed. Not endorsing  suicidal or homicidal ideation intent or plans; no mention of auditory or visual hallucinations but guarded and suspicious.   ;;12/08: accepting Zyprexa will continue titration; tends to stay in bed in the am; no new behavioral issues.  ;;12/09: some group attendance with participation.   ;;12/13: minimal improvement in socialization and adls; would continue Zyprexa titration.   ;;12/14: minimal improvement; may need to review regime for Zyprexa in view of pm erratic compliance; consider alternative antipsychotic if progress is limited.   ;;12/15: encouraging better ADLs;  considering CROCKER; likely a different antipsychotic.   ;;12/16: given drowsiness and withdrawal will need to continue lowering Lorazepam and may need to introduce Prolxin with transition to CROCKER for positive psychotic sxs; being assessed for paranoid thinking which may be a facor in social avoidance.   ;;12/17: rejects Prolixin but compliant with Zyprexa; more visible today and mood appears to be improving with no mention of AH or VH.   Still guarded.   ;;12/20: more visible ; better adls; less drowsy in am; attends and partiipates in groups; Not endorsing  suicidal or homicidal ideation intent or plans; no mention of auditory or visual hallucinations ;;12/21: similar to yesterday; more responsive and more group attendance and improving adls; need to start considering aftercare needs.  ;;12/22: while improving spotty compliance is an issue; patient may need a practical CROCKER such as Prolixin.   ;;12/23: compliant with Zyprexa; needs encouragement to go to groups; coordinate with ACT to plan aftercare.     Current medications aluminum hydroxide/magnesium hydroxide/simethicone Suspension 30 milliLiter(s) Oral every 4 hours PRN  diphenhydrAMINE 25 milliGRAM(s) Oral every 6 hours PRN  LORazepam     Tablet 1 milliGRAM(s) Oral <User Schedule>  nicotine - 21 mG/24Hr(s) Patch 1 patch Transdermal daily  OLANZapine 5 milliGRAM(s) Oral every 12 hours PRN  OLANZapine 20 milliGRAM(s) Oral at bedtime; to consider raising to 25mg at bedtime; consider switching

## 2021-12-24 LAB — SARS-COV-2 RNA SPEC QL NAA+PROBE: SIGNIFICANT CHANGE UP

## 2021-12-24 RX ADMIN — OLANZAPINE 20 MILLIGRAM(S): 15 TABLET, FILM COATED ORAL at 21:23

## 2021-12-24 RX ADMIN — Medication 1 MILLIGRAM(S): at 13:12

## 2021-12-25 RX ADMIN — OLANZAPINE 5 MILLIGRAM(S): 15 TABLET, FILM COATED ORAL at 16:27

## 2021-12-25 RX ADMIN — Medication 1 MILLIGRAM(S): at 12:37

## 2021-12-25 RX ADMIN — OLANZAPINE 20 MILLIGRAM(S): 15 TABLET, FILM COATED ORAL at 21:30

## 2021-12-26 RX ADMIN — OLANZAPINE 5 MILLIGRAM(S): 15 TABLET, FILM COATED ORAL at 15:24

## 2021-12-26 RX ADMIN — Medication 1 MILLIGRAM(S): at 15:24

## 2021-12-26 RX ADMIN — OLANZAPINE 20 MILLIGRAM(S): 15 TABLET, FILM COATED ORAL at 21:26

## 2021-12-27 LAB — SARS-COV-2 RNA SPEC QL NAA+PROBE: SIGNIFICANT CHANGE UP

## 2021-12-27 PROCEDURE — 99232 SBSQ HOSP IP/OBS MODERATE 35: CPT

## 2021-12-27 RX ADMIN — Medication 1 MILLIGRAM(S): at 17:19

## 2021-12-27 RX ADMIN — OLANZAPINE 20 MILLIGRAM(S): 15 TABLET, FILM COATED ORAL at 21:39

## 2021-12-27 NOTE — BH INPATIENT PSYCHIATRY PROGRESS NOTE - NSBHCHARTREVIEWVS_PSY_A_CORE FT
Vital Signs Last 24 Hrs  T(C): 36.4 (12-26-21 @ 16:39), Max: 36.4 (12-26-21 @ 16:39)  T(F): 97.6 (12-26-21 @ 16:39), Max: 97.6 (12-26-21 @ 16:39)  HR: 98 (12-26-21 @ 16:39) (98 - 98)  BP: 138/86 (12-26-21 @ 16:39) (138/86 - 138/86)  BP(mean): --  RR: 16 (12-26-21 @ 16:39) (16 - 16)  SpO2: 98% (12-26-21 @ 16:39) (98% - 98%)

## 2021-12-27 NOTE — BH INPATIENT PSYCHIATRY PROGRESS NOTE - NSBHFUPINTERVALHXFT_PSY_A_CORE
"I do go to groups"  "When am I leaving?"  Isolative; stays in bed; makes minimal eye contact; not conversational.

## 2021-12-27 NOTE — BH INPATIENT PSYCHIATRY PROGRESS NOTE - NSBHMETABOLIC_PSY_ALL_CORE_FT
BMI: BMI (kg/m2): 45.1 (11-29-21 @ 18:40)  HbA1c: A1C with Estimated Average Glucose Result: 4.8 % (05-07-21 @ 10:10)    Glucose:   BP: 138/86 (12-26-21 @ 16:39) (135/87 - 138/86)  Lipid Panel: Date/Time: 05-07-21 @ 10:10  Cholesterol, Serum: 158  Direct LDL: --  HDL Cholesterol, Serum: 49  Total Cholesterol/HDL Ration Measurement: --  Triglycerides, Serum: 100

## 2021-12-27 NOTE — BH INPATIENT PSYCHIATRY PROGRESS NOTE - NSBHASSESSSUMMFT_PSY_ALL_CORE
Assessment Summary	25 years old transferred from [facility]; believed to be undomiciled;  and gives unreliable history denying pst admissions but has VH and talks about breaking up with her boyfriend "The Devil" laughs inappropriately; uses Cannabis daily and cigarettes daily.     ;;11/30: appears acutely psychosis with VH suggesting substance use is a contributing factor but also inappropriate affect and disorganized and delusional lthinking suggestion chronic psychosis.  Titrate upwards on Zyprexa; NRT; judicious use of Ativan for anxiety.  ;;12/01: continues disorganized; guarded; Evidence for thought blocking and some delayed  response latencies.  no s/h I/i/p but guarded about AH or VH.  Sleep  appetite ok; no pain issues.  Continue Prolixin titration for psychosis    ;;12/02: continue isolative; tapering Ativan and raising Zyprexa at night.  guarded but denies SI or AH.   ;;12/03: continues isolative with poor adls; med seeking; guarded; does not talk about sxs; received 5mg Zyprexa; Zyprexa being titrated upwards  ;;12/06: had spat on staff and had stopped taking pm Zyprexa;  ;;12/07: compliant with pm Zyprexa but isolative.  Stays in bed. Not endorsing  suicidal or homicidal ideation intent or plans; no mention of auditory or visual hallucinations but guarded and suspicious.   ;;12/08: accepting Zyprexa will continue titration; tends to stay in bed in the am; no new behavioral issues.  ;;12/09: some group attendance with participation.   ;;12/13: minimal improvement in socialization and adls; would continue Zyprexa titration.   ;;12/14: minimal improvement; may need to review regime for Zyprexa in view of pm erratic compliance; consider alternative antipsychotic if progress is limited.   ;;12/15: encouraging better ADLs;  considering CROCKER; likely a different antipsychotic.   ;;12/16: given drowsiness and withdrawal will need to continue lowering Lorazepam and may need to introduce Prolxin with transition to CROCKER for positive psychotic sxs; being assessed for paranoid thinking which may be a facor in social avoidance.   ;;12/17: rejects Prolixin but compliant with Zyprexa; more visible today and mood appears to be improving with no mention of AH or VH.   Still guarded.   ;;12/20: more visible ; better adls; less drowsy in am; attends and partiipates in groups; Not endorsing  suicidal or homicidal ideation intent or plans; no mention of auditory or visual hallucinations ;;12/21: similar to yesterday; more responsive and more group attendance and improving adls; need to start considering aftercare needs.  ;;12/22: while improving spotty compliance is an issue; patient may need a practical CROCKER such as Prolixin.   ;;12/23: compliant with Zyprexa; needs encouragement to go to groups; coordinate with ACT to plan aftercare.   ;;12/27: observed up on Friday 12/24 during the day but today mostly stays in bed; will taper Ativan somewhat; begin aftercare palnning. Not endorsing  suicidal or homicidal ideation intent or plans; no mention of auditory or visual hallucinations     Current medications aluminum hydroxide/magnesium hydroxide/simethicone Suspension 30 milliLiter(s) Oral every 4 hours PRN  diphenhydrAMINE 25 milliGRAM(s) Oral every 6 hours PRN  LORazepam     Tablet 1 milliGRAM(s) Oral <User Schedule>  nicotine - 21 mG/24Hr(s) Patch 1 patch Transdermal daily  OLANZapine 5 milliGRAM(s) Oral every 12 hours PRN  OLANZapine 20 milliGRAM(s) Oral at bedtime; to consider raising to 25mg at bedtime; consider switching

## 2021-12-28 LAB — SARS-COV-2 RNA SPEC QL NAA+PROBE: DETECTED

## 2021-12-28 PROCEDURE — 99232 SBSQ HOSP IP/OBS MODERATE 35: CPT

## 2021-12-28 RX ADMIN — Medication 1 MILLIGRAM(S): at 13:09

## 2021-12-28 RX ADMIN — OLANZAPINE 20 MILLIGRAM(S): 15 TABLET, FILM COATED ORAL at 21:34

## 2021-12-28 NOTE — BH CHART NOTE - NSEVENTNOTEFT_PSY_ALL_CORE
Patient is 24 YO female white, homeless, non caregiver, with past history of psychosis NOS. Patient admits of hearing voices, no CAH, denies any SI/HI/intent/plan. Will start patient on Zyprexa 5 mg due to good past response. 
Per nursing, pt endorsed having punched a wall with her right hand. On inspection, pt with blood and bruising to right hand knuckles, but declining physical exam and says "leave me alone", amenable to get XR of her hand. No other complaints per pt.
Patient tested positive for covid. She was informed of the test results and instructed to maintain proper masking and proper distance from staff and patients on the unit. She verbalized that she will remain in her room through the evening and night.

## 2021-12-28 NOTE — BH INPATIENT PSYCHIATRY PROGRESS NOTE - NSBHASSESSSUMMFT_PSY_ALL_CORE
Assessment Summary	25 years old transferred from [facility]; believed to be undomiciled;  and gives unreliable history denying pst admissions but has VH and talks about breaking up with her boyfriend "The Devil" laughs inappropriately; uses Cannabis daily and cigarettes daily.     ;;11/30: appears acutely psychosis with VH suggesting substance use is a contributing factor but also inappropriate affect and disorganized and delusional lthinking suggestion chronic psychosis.  Titrate upwards on Zyprexa; NRT; judicious use of Ativan for anxiety.  ;;12/01: continues disorganized; guarded; Evidence for thought blocking and some delayed  response latencies.  no s/h I/i/p but guarded about AH or VH.  Sleep  appetite ok; no pain issues.  Continue Prolixin titration for psychosis    ;;12/02: continue isolative; tapering Ativan and raising Zyprexa at night.  guarded but denies SI or AH.   ;;12/03: continues isolative with poor adls; med seeking; guarded; does not talk about sxs; received 5mg Zyprexa; Zyprexa being titrated upwards  ;;12/06: had spat on staff and had stopped taking pm Zyprexa;  ;;12/07: compliant with pm Zyprexa but isolative.  Stays in bed. Not endorsing  suicidal or homicidal ideation intent or plans; no mention of auditory or visual hallucinations but guarded and suspicious.   ;;12/08: accepting Zyprexa will continue titration; tends to stay in bed in the am; no new behavioral issues.  ;;12/09: some group attendance with participation.   ;;12/13: minimal improvement in socialization and adls; would continue Zyprexa titration.   ;;12/14: minimal improvement; may need to review regime for Zyprexa in view of pm erratic compliance; consider alternative antipsychotic if progress is limited.   ;;12/15: encouraging better ADLs;  considering CROCKER; likely a different antipsychotic.   ;;12/16: given drowsiness and withdrawal will need to continue lowering Lorazepam and may need to introduce Prolxin with transition to CROCKER for positive psychotic sxs; being assessed for paranoid thinking which may be a facor in social avoidance.   ;;12/17: rejects Prolixin but compliant with Zyprexa; more visible today and mood appears to be improving with no mention of AH or VH.   Still guarded.   ;;12/20: more visible ; better adls; less drowsy in am; attends and partiipates in groups; Not endorsing  suicidal or homicidal ideation intent or plans; no mention of auditory or visual hallucinations ;;12/21: similar to yesterday; more responsive and more group attendance and improving adls; need to start considering aftercare needs.  ;;12/22: while improving spotty compliance is an issue; patient may need a practical CROCKER such as Prolixin.   ;;12/23: compliant with Zyprexa; needs encouragement to go to groups; coordinate with ACT to plan aftercare.   ;;12/27: observed up on Friday 12/24 during the day but today mostly stays in bed; will taper Ativan somewhat; begin aftercare planing. Not endorsing  suicidal or homicidal ideation intent or plans; no mention of auditory or visual hallucinations   ;;12/28: fair adls; does not want to change meds; coordinate with ACT team re aftercare.     Current medications aluminum hydroxide/magnesium hydroxide/simethicone Suspension 30 milliLiter(s) Oral every 4 hours PRN  diphenhydrAMINE 25 milliGRAM(s) Oral every 6 hours PRN  LORazepam     Tablet 1 milliGRAM(s) Oral <User Schedule>  nicotine - 21 mG/24Hr(s) Patch 1 patch Transdermal daily  OLANZapine 5 milliGRAM(s) Oral every 12 hours PRN  OLANZapine 20 milliGRAM(s) Oral at bedtime; to consider raising to 25mg at bedtime; consider switching

## 2021-12-28 NOTE — BH TREATMENT PLAN - NSTXPSYCHOINTERRN_PSY_ALL_CORE
Encourage pt to comply with the treatment plan
Patient will utilize coping skills to mitigate psychosis
Encourage patient to adhere with prescribed medications and treatment, verbalize feelings and concerns, attend groups and engage in unit activities.
Encourage patient to adhere with prescribed medications and treatment, participation in groups and unit activities, verbalization of feelings and concerns.
patient will participate in groups during day and comfortable socialization during the evening.

## 2021-12-28 NOTE — BH TREATMENT PLAN - NSTXPSYCHOGOALOTHER_PSY_ALL_CORE
Pt will continue to participate in groups and milieu tx structure of unit
Patient will stabilize mood and alleviate symptoms of psychosis to engage in discharge planning.
Patient will stabilize mood and alleviate symptoms of psychosis to engage in discharge planning.
Pt will participate in groups and in milieu treatment structure of the unit

## 2021-12-28 NOTE — BH TREATMENT PLAN - NSTXPSYCHOINTERPR_PSY_ALL_CORE
Pt will continue to be invited and encouraged to attend all offered, self selected groups
Pt. will be invited and encouraged to attend all offered groups
Pt will continue to be invited and encouraged to attend all offered groups
Pt will continue to be invited and encouraged to attend all offered groups

## 2021-12-28 NOTE — BH TREATMENT PLAN - NSTXPLANTHERAPYSESSIONSFT_PSY_ALL_CORE
12-24-21  Type of therapy: Creative arts therapy  Type of session: Group  Level of patient participation: Participates  Duration of participation: 45 minutes  Therapy conducted by: Psych rehab  Therapy Summary: Pt joined the group a few minutes late and left group early. Pt shows superficially bright affect and participates in group dialogue with similar superficiality. Pt initially overwhelming her piece of [place] with water, not waiting for full instruction, but was able to stay in behavioral/emotional control and create a formed container as prompted. Pt showed more awareness of her peers than typically observed, using mildly inappropriate humor and getting a positive response from peers in that sense.  
  12-17-21  Type of therapy: Peer advocate  Type of session: Group  Level of patient participation: Pt declined group  --  Therapy conducted by: Psych rehab  Therapy Summary: Pt is typically asleep or unresponsive when invited to groups, often isolative to her room. When invited to this peer advocate group today, Pt was in bed but responsive, declining interest and preference to remain in bed. Pt was superficially pleasant but nabeel.    12-18-21  Type of therapy: Health and fitness  Type of session: Group  Level of patient participation: Participates  Duration of participation: 45 minutes  Therapy conducted by: Psych rehab  Therapy Summary: Pt was more visible on the unit at intervals today, and showed interest in the walking with music group. Pt was the first to attend, and waited patiently for peers to join. Pt was in the lead of the group, walking ahead of peers for entire group, and showed interest in getting to choose own songs. Pt was bright but superficial, and showed no interest in engaging with peers. Pt denies feeling any improvement since her admission and is discharge focused. Pt endorses goal of attending more groups at the encouragement of her doctor, and seems more motivated to engage if it will lead to discharge sooner.

## 2021-12-28 NOTE — BH TREATMENT PLAN - NSTXPATIENTPARTICIPATE_PSY_ALL_CORE
Patient participated in identification of needs/problems/goals for treatment
No, patient unwilling to participate
Patient participated in identification of needs/problems/goals for treatment
Patient participated in identification of needs/problems/goals for treatment/Patient participated in defining interventions
Patient participated in identification of needs/problems/goals for treatment/Patient participated in defining interventions

## 2021-12-28 NOTE — BH TREATMENT PLAN - NSDCCRITERIA_PSY_ALL_CORE
No SI or VH; coherent thinking; improved judgment. 
No SI or VH; coherent thinking; improved judgment. 
mostly coherent thinking ; no VH; no SI; appropriate social interactions; 
No SI or VH; coherent thinking; improved judgment. 
No SI or VH; coherent thinking; improved judgment.

## 2021-12-28 NOTE — BH TREATMENT PLAN - NSTXDCOPNOINTERRN_PSY_ALL_CORE
Encourage patient to adhere with prescribed medications and treatment, participation in groups and unit activities, verbalization of feelings and concerns.
Encourage patient to adhere with prescribed medications and treatment, verbalize feelings and concerns, attend groups and engage in unit activities.
Ptient will comply with medication regimen
Encourage patient to ask for medication when needed.
Encourage pt to comply with the treatment plan

## 2021-12-28 NOTE — BH INPATIENT PSYCHIATRY PROGRESS NOTE - NSBHFUPINTERVALHXFT_PSY_A_CORE
doesn't want her medications changed  "I told my ACT team that I take Zyprexa and Ativan"  does not want Sundar (of other meds).  Not endorsing  suicidal or homicidal ideation intent or plans; no mention of auditory or visual hallucinations  fair adls; up and about during the day but not in groups when objserved. Thinking is congruent with affect; no peculiarities of thinking or language use.

## 2021-12-29 PROCEDURE — 99233 SBSQ HOSP IP/OBS HIGH 50: CPT

## 2021-12-29 RX ORDER — OLANZAPINE 15 MG/1
1 TABLET, FILM COATED ORAL
Qty: 30 | Refills: 0
Start: 2021-12-29 | End: 2022-01-27

## 2021-12-29 RX ORDER — NICOTINE POLACRILEX 2 MG
1 GUM BUCCAL
Qty: 14 | Refills: 0
Start: 2021-12-29 | End: 2022-01-11

## 2021-12-29 RX ORDER — OLANZAPINE 15 MG/1
1 TABLET, FILM COATED ORAL
Qty: 0 | Refills: 0 | DISCHARGE
Start: 2021-12-29

## 2021-12-29 RX ORDER — NICOTINE POLACRILEX 2 MG
1 GUM BUCCAL
Qty: 30 | Refills: 0
Start: 2021-12-29 | End: 2022-01-27

## 2021-12-29 RX ADMIN — Medication 1 MILLIGRAM(S): at 13:26

## 2021-12-29 RX ADMIN — OLANZAPINE 5 MILLIGRAM(S): 15 TABLET, FILM COATED ORAL at 13:27

## 2021-12-29 RX ADMIN — OLANZAPINE 20 MILLIGRAM(S): 15 TABLET, FILM COATED ORAL at 22:20

## 2021-12-29 NOTE — BH DISCHARGE NOTE NURSING/SOCIAL WORK/PSYCH REHAB - PATIENT PORTAL LINK FT
You can access the FollowMyHealth Patient Portal offered by [facility] by registering at the following website: [email or web address]. By joining [facility]’s FollowMyHealth portal, you will also be able to view your health information using other applications (apps) compatible with our system.

## 2021-12-29 NOTE — BH PSYCHOLOGY ASSESSMENT - NSBHPSYCHOLASREASON_PSY_A_CORE FT
Pt declined to do the safety plan on two different occasions, stating that she does not feel suicidal, and that she just wants to sleep. CLosing the covers over her head and stopped talking to this writer twice.

## 2021-12-29 NOTE — BH INPATIENT PSYCHIATRY DISCHARGE NOTE - NSDCCPCAREPLAN_GEN_ALL_CORE_FT
PRINCIPAL DISCHARGE DIAGNOSIS  Diagnosis: Paranoid schizophrenia with prominent negative symptoms  Assessment and Plan of Treatment:       SECONDARY DISCHARGE DIAGNOSES  Diagnosis: Cannabis abuse, daily use  Assessment and Plan of Treatment:

## 2021-12-29 NOTE — BH DISCHARGE NOTE NURSING/SOCIAL WORK/PSYCH REHAB - NSDCPRGOAL_PSY_ALL_CORE
Pt has been mostly isolative throughout this admission, but has been more responsive at times and attended select groups later in the admission. Pt has been superficially pleasant with constricted affect, and seems less irritable than at admission. In a recent group attended, Pt was able to create a formed container from [place] as prompted, and used humor when sharing, showing greater awareness of and interest to engage more with peers than seen in previous weeks. Pt declined engaging in creative arts therapies assessment, and as well declined to engage in a safety plan but denies SI.

## 2021-12-29 NOTE — BH INPATIENT PSYCHIATRY DISCHARGE NOTE - HPI (INCLUDE ILLNESS QUALITY, SEVERITY, DURATION, TIMING, CONTEXT, MODIFYING FACTORS, ASSOCIATED SIGNS AND SYMPTOMS)
25 year old female transferred from [facility] gave little information; denies past adissions; states that she see " Pentecostalism"  and broke up with her boyfriend who she states she has "known for 27 years and is the Devil"   talks in a disorganized manner about being a "Hoahaoism" raised [employer] and going to [facility]; initially denied SA but later acknowledged cutting herself or almost cutting herself.  Expressed a preference for Zyprexa and Ativan but unclear when she first recived this. Acknowledges using cannabis.

## 2021-12-29 NOTE — BH INPATIENT PSYCHIATRY PROGRESS NOTE - NSBHFUPINTERVALHXFT_PSY_A_CORE
Pt tested positive for COVID last night but asymptomatic. Tried to discharge pt today as she does not seem an imminent threat to self or others and seems to have benefited very much from zyprexa uptitration. Unfortunately COVID services for the homeless were unavailable for her prior to end of workday. Pt is in favor of discharge but refusing to complete safety plan with psychologist.

## 2021-12-29 NOTE — BH INPATIENT PSYCHIATRY DISCHARGE NOTE - NSDCMRMEDTOKEN_GEN_ALL_CORE_FT
LORazepam 2 mg oral tablet: 1 tab(s) orally 2 times a day MDD:4 mg/day  OLANZapine 10 mg oral tablet: 1 tab(s) orally once a day x 7 days  at 6pm for psychosis    LORazepam 1 mg oral tablet: 1 tab(s) orally every 12 hours, As needed, anxiety MDD:2 mg  nicotine 21 mg/24 hr transdermal film, extended release: 1 patch transdermal once a day   OLANZapine 20 mg oral tablet: 1 tab(s) orally once a day (at bedtime)   LORazepam 1 mg oral tablet: 1 tab(s) orally every 12 hours, As needed, anxiety MDD:2 mg  nicotine 21 mg/24 hr transdermal film, extended release: 1 patch transdermal once a day   OLANZapine 20 mg oral tablet: 1 tab(s) orally once a day (at bedtime)  OLANZapine 20 mg oral tablet: 1 tab(s) orally once a day (at bedtime)    Ativan 1 mg oral tablet: 1 tab(s) orally 2 times a day MDD:2 mg  nicotine 21 mg/24 hr transdermal film, extended release: 1 patch transdermal once a day   OLANZapine 20 mg oral tablet: 1 tab(s) orally once a day (at bedtime)

## 2021-12-29 NOTE — BH INPATIENT PSYCHIATRY DISCHARGE NOTE - OTHER PAST PSYCHIATRIC HISTORY (INCLUDE DETAILS REGARDING ONSET, COURSE OF ILLNESS, INPATIENT/OUTPATIENT TREATMENT)
Patient presented disorganized, tangential and unable to provide past psychiatric history. Per chart review and Psyckes patient has had numerous past hospitalizations and has an ACT team in [facility].

## 2021-12-29 NOTE — BH PSYCHOLOGY ASSESSMENT - NSBHPSYCHOLASBEHAV_PSY_A_CORE FT
APPEARANCE:disheveled    SENSORY PERCEPTION:     EYE CONTACT: poor    MOVEMENT:normal    SPEECH:only one word answers    THOUGHT PROCESSING: unable to assess    ORIENTATION:unable to assess    THOUGHT CONTENT:denied SI    MOOD: labile    SUICIDAL/ HOMICIDAL IDEATION:denied      COOPERATION:non-cooperative

## 2021-12-29 NOTE — BH DISCHARGE NOTE NURSING/SOCIAL WORK/PSYCH REHAB - NSCDUDCCRISIS_PSY_A_CORE
Critical access hospital Well  1 [phone]) Critical access hospital-WELL ([phone])  Text "WELL" to 09389  Website: www.YAZUO/.[facility] 1 (025) 841-SACM (1944) Website: www.safehorizon.org/.National Suicide Prevention Lifeline 9 (463) 705-5700/.  [facility]  1 (413) LIFENET (301-5353)/.  [facility]  [address] [place] [address] [phone]   Hours:  Monday through Friday from 9 AM to 3 PM/.  [place] Dept of  Affairs - Veterans Crisis Line  0 (834) 951-8539, Option 1 [facility] Well  1 (626) [facility]-WELL ([phone])  Text "WELL" to 45594  Website: www.Zoombu/.[facility] 1 (311) 061-HOPE (3393) Website: www.safehorizon.org/.  [facility] – Crisis Care for Children, Adults and Families  [address] [place] [address]  Mobile Crisis Hotline – (774) 767-1089/.National Suicide Prevention Lifeline 3 (537) 319-4638/.  [facility]  1 (647) LIFENET (685-0258)/.  [facility] Response Crisis Hotline  (984) 891-7073  24 hour telephone crisis intervention and suicide prevention hotline concerned with all mental health issues/.  [facility]  [address] [place] [address] [phone]   Hours:  Monday through Friday from 9 AM to 3 PM [facility] Well  1 (349) [facility]-WELL ([phone])  Text "WELL" to 56479  Website: www.RentMonitor/.[facility]iz[facility] 1 (880) 701-UGDN (1301) Website: www.safehorizon.org/.National Suicide Prevention Lifeline 4 (081) 387-9289/.  [facility]  1 (997) LIFENET (273-3917)/.  [facility] Behavioral [facility] Crisis [facility]  [address] [place] [address] [phone]   Hours:  Monday through Friday from 9 AM to 3 PM/.  [place] Dept of  Affairs - Veterans Crisis Line  2 [phone]) [phone], Option 1

## 2021-12-29 NOTE — BH INPATIENT PSYCHIATRY DISCHARGE NOTE - HOSPITAL COURSE
Pt improved with uptitration of zyprexa. She had once incident of aggression while on the inpatient unit in which she spit at a staff member but that was weeks ago. She has been friendly and reasonable for over a week and a half. Team discussed starting a long-acting injectable but pt only trusts zyprexa and wanted to continue on it. On 12/28 pt tested positive for COVID but was asymptomatic. Pt refused to fill out safety plan but denies all suicidal ideation. Pt agreeing to follow up with ACT Team and to go to homeless services for people with COVID infection.     MSE- Fairly groomed and related, good EC. Wearing her mask appropriately. Speech wnl. Mood: "OK" Affect: constricted, appropriate TP: linear, concrete TC: -SI/HI/AH/VH. + mild PI. I&J: fair

## 2021-12-29 NOTE — BH INPATIENT PSYCHIATRY PROGRESS NOTE - MSE UNSTRUCTURED FT
Well groomed and related, good EC. Alert; oriented; cognition intact; speech wnl; good eye contact; TP: linear but concrete TC: -SI/HI/AH/VH, +mild PI I&J: fair

## 2021-12-29 NOTE — BH INPATIENT PSYCHIATRY DISCHARGE NOTE - NSBHMETABOLIC_PSY_ALL_CORE_FT
BMI: BMI (kg/m2): 45.1 (11-29-21 @ 18:40)  HbA1c: A1C with Estimated Average Glucose Result: 4.8 % (05-07-21 @ 10:10)    Glucose:   BP: 138/86 (12-26-21 @ 16:39) (138/86 - 138/86)  Lipid Panel: Date/Time: 05-07-21 @ 10:10  Cholesterol, Serum: 158  Direct LDL: --  HDL Cholesterol, Serum: 49  Total Cholesterol/HDL Ration Measurement: --  Triglycerides, Serum: 100

## 2021-12-29 NOTE — BH INPATIENT PSYCHIATRY PROGRESS NOTE - NSBHASSESSSUMMFT_PSY_ALL_CORE
Assessment Summary	25 years old transferred from [facility] [place]; believed to be undomiciled;  and gives unreliable history denying pst admissions but has VH and talks about breaking up with her boyfriend "The Devil" laughs inappropriately; uses Cannabis daily and cigarettes daily.     ;;11/30: appears acutely psychosis with VH suggesting substance use is a contributing factor but also inappropriate affect and disorganized and delusional lthinking suggestion chronic psychosis.  Titrate upwards on Zyprexa; NRT; judicious use of Ativan for anxiety.  ;;12/01: continues disorganized; guarded; Evidence for thought blocking and some delayed  response latencies.  no s/h I/i/p but guarded about AH or VH.  Sleep  appetite ok; no pain issues.  Continue Prolixin titration for psychosis    ;;12/02: continue isolative; tapering Ativan and raising Zyprexa at night.  guarded but denies SI or AH.   ;;12/03: continues isolative with poor adls; med seeking; guarded; does not talk about sxs; received 5mg Zyprexa; Zyprexa being titrated upwards  ;;12/06: had spat on staff and had stopped taking pm Zyprexa;  ;;12/07: compliant with pm Zyprexa but isolative.  Stays in bed. Not endorsing  suicidal or homicidal ideation intent or plans; no mention of auditory or visual hallucinations but guarded and suspicious.   ;;12/08: accepting Zyprexa will continue titration; tends to stay in bed in the am; no new behavioral issues.  ;;12/09: some group attendance with participation.   ;;12/13: minimal improvement in socialization and adls; would continue Zyprexa titration.   ;;12/14: minimal improvement; may need to review regime for Zyprexa in view of pm erratic compliance; consider alternative antipsychotic if progress is limited.   ;;12/15: encouraging better ADLs;  considering CROCKER; likely a different antipsychotic.   ;;12/16: given drowsiness and withdrawal will need to continue lowering Lorazepam and may need to introduce Prolxin with transition to CROCKER for positive psychotic sxs; being assessed for paranoid thinking which may be a facor in social avoidance.   ;;12/17: rejects Prolixin but compliant with Zyprexa; more visible today and mood appears to be improving with no mention of AH or VH.   Still guarded.   ;;12/20: more visible ; better adls; less drowsy in am; attends and partiipates in groups; Not endorsing  suicidal or homicidal ideation intent or plans; no mention of auditory or visual hallucinations ;;12/21: similar to yesterday; more responsive and more group attendance and improving adls; need to start considering aftercare needs.  ;;12/22: while improving spotty compliance is an issue; patient may need a practical CROCKER such as Prolixin.   ;;12/23: compliant with Zyprexa; needs encouragement to go to groups; coordinate with ACT to plan aftercare.   ;;12/27: observed up on Friday 12/24 during the day but today mostly stays in bed; will taper Ativan somewhat; begin aftercare planing. Not endorsing  suicidal or homicidal ideation intent or plans; no mention of auditory or visual hallucinations   ;;12/28: fair adls; does not want to change meds; coordinate with ACT team re aftercare.     Current medications aluminum hydroxide/magnesium hydroxide/simethicone Suspension 30 milliLiter(s) Oral every 4 hours PRN  diphenhydrAMINE 25 milliGRAM(s) Oral every 6 hours PRN  LORazepam     Tablet 1 milliGRAM(s) Oral <User Schedule>  nicotine - 21 mG/24Hr(s) Patch 1 patch Transdermal daily  OLANZapine 5 milliGRAM(s) Oral every 12 hours PRN  OLANZapine 20 milliGRAM(s) Oral at bedtime; to consider raising to 25mg at bedtime; consider switching

## 2021-12-30 PROCEDURE — 99232 SBSQ HOSP IP/OBS MODERATE 35: CPT

## 2021-12-30 RX ORDER — BENZOCAINE AND MENTHOL 5; 1 G/100ML; G/100ML
1 LIQUID ORAL
Refills: 0 | Status: DISCONTINUED | OUTPATIENT
Start: 2021-12-30 | End: 2021-12-30

## 2021-12-30 RX ORDER — BENZOCAINE AND MENTHOL 5; 1 G/100ML; G/100ML
1 LIQUID ORAL
Refills: 0 | Status: DISCONTINUED | OUTPATIENT
Start: 2021-12-30 | End: 2022-01-03

## 2021-12-30 RX ADMIN — BENZOCAINE AND MENTHOL 1 LOZENGE: 5; 1 LIQUID ORAL at 21:45

## 2021-12-30 RX ADMIN — OLANZAPINE 5 MILLIGRAM(S): 15 TABLET, FILM COATED ORAL at 14:44

## 2021-12-30 RX ADMIN — OLANZAPINE 20 MILLIGRAM(S): 15 TABLET, FILM COATED ORAL at 21:44

## 2021-12-30 RX ADMIN — Medication 1 MILLIGRAM(S): at 14:44

## 2021-12-30 NOTE — BH INPATIENT PSYCHIATRY PROGRESS NOTE - NSTXPSYCHODATETRGT_PSY_ALL_CORE
14-Dec-2021
28-Dec-2021
26-Dec-2021
03-Jan-2022
21-Dec-2021
21-Dec-2021
03-Jan-2022
14-Dec-2021
21-Dec-2021
07-Dec-2021
28-Dec-2021
07-Dec-2021
28-Dec-2021
07-Dec-2021
14-Dec-2021
14-Dec-2021
07-Dec-2021
14-Dec-2021
07-Dec-2021

## 2021-12-30 NOTE — BH INPATIENT PSYCHIATRY PROGRESS NOTE - NSDCCRITERIA_PSY_ALL_CORE
No SI or VH; coherent thinking; improved judgment. 

## 2021-12-30 NOTE — BH INPATIENT PSYCHIATRY PROGRESS NOTE - NSBHCHARTREVIEWVS_PSY_A_CORE FT
Vital Signs Last 24 Hrs  T(C): 36.6 (12-30-21 @ 18:57), Max: 36.6 (12-30-21 @ 13:00)  T(F): 97.9 (12-30-21 @ 18:57), Max: 97.9 (12-30-21 @ 13:00)  HR: 116 (12-30-21 @ 18:57) (88 - 116)  BP: 129/90 (12-30-21 @ 18:57) (129/90 - 136/96)  BP(mean): --  RR: 19 (12-30-21 @ 18:57) (19 - 19)  SpO2: 98% (12-30-21 @ 18:57) (97% - 98%)

## 2021-12-30 NOTE — BH INPATIENT PSYCHIATRY PROGRESS NOTE - NSBHATTESTSEENBY_PSY_A_CORE
attending Psychiatrist without NP/Trainee
NP without Attending Psychiatrist
attending Psychiatrist without NP/Trainee

## 2021-12-30 NOTE — BH INPATIENT PSYCHIATRY PROGRESS NOTE - NSBHADMITMEDEDUDETAILS_A_CORE FT
Zyprexa but may need to consider alternatives in view of limited progress. 
Zyprexa rejected Prolixin at this time.

## 2021-12-30 NOTE — BH INPATIENT PSYCHIATRY PROGRESS NOTE - NSICDXBHSECONDARYDX_PSY_ALL_CORE
Cannabis abuse, daily use   F12.10  Substance induced mood disorder   F19.94  
Cannabis abuse, daily use   F12.10  
Cannabis abuse, daily use   F12.10  Substance induced mood disorder   F19.94  
Cannabis abuse, daily use   F12.10  
Cannabis abuse, daily use   F12.10  Substance induced mood disorder   F19.94  

## 2021-12-30 NOTE — BH INPATIENT PSYCHIATRY PROGRESS NOTE - NSBHMETABOLIC_PSY_ALL_CORE_FT
BMI: BMI (kg/m2): 45.1 (11-29-21 @ 18:40)  HbA1c: A1C with Estimated Average Glucose Result: 4.8 % (05-07-21 @ 10:10)    Glucose:   BP: 129/90 (12-30-21 @ 18:57) (129/90 - 136/96)  Lipid Panel: Date/Time: 05-07-21 @ 10:10  Cholesterol, Serum: 158  Direct LDL: --  HDL Cholesterol, Serum: 49  Total Cholesterol/HDL Ration Measurement: --  Triglycerides, Serum: 100

## 2021-12-30 NOTE — BH INPATIENT PSYCHIATRY PROGRESS NOTE - NSBHFUPINTERVALCCFT_PSY_A_CORE
Treated for VH and disorganized and delusional thinking 
Treated for VH and disorganized and delusional thinking 
ongoing treatment of psychosis
Treated for VH and disorganized and delusional thinking 
Treated for VH and disorganized and delusional thinking 
ongoing treatment of psychosis
Treated for VH and disorganized and delusional thinking 

## 2021-12-30 NOTE — BH INPATIENT PSYCHIATRY PROGRESS NOTE - CURRENT MEDICATION
MEDICATIONS  (STANDING):  nicotine - 21 mG/24Hr(s) Patch 1 patch Transdermal daily  OLANZapine 20 milliGRAM(s) Oral at bedtime    MEDICATIONS  (PRN):  aluminum hydroxide/magnesium hydroxide/simethicone Suspension 30 milliLiter(s) Oral every 4 hours PRN Dyspepsia  benzocaine 15 mG/menthol 3.6 mG Lozenge 1 Lozenge Oral every 1 hour PRN Sore Throat  diphenhydrAMINE 25 milliGRAM(s) Oral every 6 hours PRN Rash and/or Itching  LORazepam     Tablet 1 milliGRAM(s) Oral every 12 hours PRN anxiety  OLANZapine 5 milliGRAM(s) Oral every 12 hours PRN agitation

## 2021-12-30 NOTE — BH INPATIENT PSYCHIATRY PROGRESS NOTE - NSTXPROBPSYCHO_PSY_ALL_CORE
PSYCHOTIC SYMPTOMS

## 2021-12-30 NOTE — BH INPATIENT PSYCHIATRY PROGRESS NOTE - NSTXDCOPNOPROGRES_PSY_ALL_CORE
No Change
Improving
No Change
Improving
No Change
Improving
No Change
No Change
Improving

## 2021-12-30 NOTE — BH INPATIENT PSYCHIATRY PROGRESS NOTE - NSTXPSYCHODATEEST_PSY_ALL_CORE
07-Dec-2021
21-Dec-2021
30-Nov-2021
14-Dec-2021
28-Dec-2021
14-Dec-2021
30-Dec-2021
21-Dec-2021
21-Dec-2021
19-Dec-2021
07-Dec-2021
21-Dec-2021
21-Dec-2021
14-Dec-2021
30-Dec-2021
07-Dec-2021
07-Dec-2021
30-Nov-2021
28-Dec-2021
07-Dec-2021
30-Dec-2021

## 2021-12-30 NOTE — BH INPATIENT PSYCHIATRY PROGRESS NOTE - NSTXPSYCHOGOAL_PSY_ALL_CORE
Other...
Will ask for PRN medication to manage hallucinations
Other...

## 2021-12-30 NOTE — BH INPATIENT PSYCHIATRY PROGRESS NOTE - NSTXDCOPNODATEEST_PSY_ALL_CORE
19-Dec-2021
28-Dec-2021
30-Dec-2021
20-Nov-2021
30-Dec-2021
19-Dec-2021
19-Dec-2021
30-Nov-2021
19-Dec-2021
19-Dec-2021
28-Dec-2021
30-Nov-2021
30-Nov-2021
19-Dec-2021
30-Dec-2021
20-Nov-2021
30-Dec-2021
30-Nov-2021
30-Dec-2021

## 2021-12-30 NOTE — BH INPATIENT PSYCHIATRY PROGRESS NOTE - NSTXDCOPNODATETRGT_PSY_ALL_CORE
07-Dec-2021
26-Dec-2021
26-Dec-2021
07-Dec-2021
07-Dec-2021
19-Dec-2021
26-Dec-2021
07-Dec-2021
03-Jan-2022
26-Dec-2021
07-Dec-2021
19-Dec-2021
07-Dec-2021
03-Jan-2022
07-Dec-2021

## 2021-12-30 NOTE — BH INPATIENT PSYCHIATRY PROGRESS NOTE - NSTXDCOPNODATENEW_PSY_ALL_CORE
14-Dec-2021
21-Dec-2021
21-Dec-2021
26-Dec-2021
21-Dec-2021
14-Dec-2021
14-Dec-2021
21-Dec-2021
26-Dec-2021
14-Dec-2021
21-Dec-2021
26-Dec-2021
26-Dec-2021
14-Dec-2021
21-Dec-2021

## 2021-12-30 NOTE — BH INPATIENT PSYCHIATRY PROGRESS NOTE - NSBHASSESSSUMMFT_PSY_ALL_CORE
Assessment Summary	25 years old transferred from [facility]; believed to be undomiciled;  and gives unreliable history denying pst admissions but has VH and talks about breaking up with her boyfriend "The Devil" laughs inappropriately; uses Cannabis daily and cigarettes daily.     ;;11/30: appears acutely psychosis with VH suggesting substance use is a contributing factor but also inappropriate affect and disorganized and delusional lthinking suggestion chronic psychosis.  Titrate upwards on Zyprexa; NRT; judicious use of Ativan for anxiety.  ;;12/01: continues disorganized; guarded; Evidence for thought blocking and some delayed  response latencies.  no s/h I/i/p but guarded about AH or VH.  Sleep  appetite ok; no pain issues.  Continue Prolixin titration for psychosis    ;;12/02: continue isolative; tapering Ativan and raising Zyprexa at night.  guarded but denies SI or AH.   ;;12/03: continues isolative with poor adls; med seeking; guarded; does not talk about sxs; received 5mg Zyprexa; Zyprexa being titrated upwards  ;;12/06: had spat on staff and had stopped taking pm Zyprexa;  ;;12/07: compliant with pm Zyprexa but isolative.  Stays in bed. Not endorsing  suicidal or homicidal ideation intent or plans; no mention of auditory or visual hallucinations but guarded and suspicious.   ;;12/08: accepting Zyprexa will continue titration; tends to stay in bed in the am; no new behavioral issues.  ;;12/09: some group attendance with participation.   ;;12/13: minimal improvement in socialization and adls; would continue Zyprexa titration.   ;;12/14: minimal improvement; may need to review regime for Zyprexa in view of pm erratic compliance; consider alternative antipsychotic if progress is limited.   ;;12/15: encouraging better ADLs;  considering CROCKER; likely a different antipsychotic.   ;;12/16: given drowsiness and withdrawal will need to continue lowering Lorazepam and may need to introduce Prolxin with transition to CROCKER for positive psychotic sxs; being assessed for paranoid thinking which may be a facor in social avoidance.   ;;12/17: rejects Prolixin but compliant with Zyprexa; more visible today and mood appears to be improving with no mention of AH or VH.   Still guarded.   ;;12/20: more visible ; better adls; less drowsy in am; attends and partiipates in groups; Not endorsing  suicidal or homicidal ideation intent or plans; no mention of auditory or visual hallucinations ;;12/21: similar to yesterday; more responsive and more group attendance and improving adls; need to start considering aftercare needs.  ;;12/22: while improving spotty compliance is an issue; patient may need a practical CROCKER such as Prolixin.   ;;12/23: compliant with Zyprexa; needs encouragement to go to groups; coordinate with ACT to plan aftercare.   ;;12/27: observed up on Friday 12/24 during the day but today mostly stays in bed; will taper Ativan somewhat; begin aftercare planing. Not endorsing  suicidal or homicidal ideation intent or plans; no mention of auditory or visual hallucinations   ;;12/28: fair adls; does not want to change meds; coordinate with ACT team re aftercare.     Current medications aluminum hydroxide/magnesium hydroxide/simethicone Suspension 30 milliLiter(s) Oral every 4 hours PRN  diphenhydrAMINE 25 milliGRAM(s) Oral every 6 hours PRN  LORazepam     Tablet 1 milliGRAM(s) Oral <User Schedule>  nicotine - 21 mG/24Hr(s) Patch 1 patch Transdermal daily  OLANZapine 5 milliGRAM(s) Oral every 12 hours PRN  OLANZapine 20 milliGRAM(s) Oral at bedtime; to consider raising to 25mg at bedtime; consider switching     12/30- Pt needs COVID hotel but otherwise ready for discharge. Reached out to medicine to possibly transfer pt to medicine until she is discharged to separate her from other 8U negative patients.

## 2021-12-30 NOTE — BH INPATIENT PSYCHIATRY PROGRESS NOTE - NSCGISEVERILLNESS_PSY_ALL_CORE
5 = Markedly ill - intrusive symptoms that distinctly impair social/occupational function or cause intrusive levels of distress

## 2021-12-30 NOTE — BH INPATIENT PSYCHIATRY PROGRESS NOTE - PRN MEDS
MEDICATIONS  (PRN):  aluminum hydroxide/magnesium hydroxide/simethicone Suspension 30 milliLiter(s) Oral every 4 hours PRN Dyspepsia  benzocaine 15 mG/menthol 3.6 mG Lozenge 1 Lozenge Oral every 1 hour PRN Sore Throat  diphenhydrAMINE 25 milliGRAM(s) Oral every 6 hours PRN Rash and/or Itching  LORazepam     Tablet 1 milliGRAM(s) Oral every 12 hours PRN anxiety  OLANZapine 5 milliGRAM(s) Oral every 12 hours PRN agitation

## 2021-12-30 NOTE — BH INPATIENT PSYCHIATRY PROGRESS NOTE - NSBHINPTBILLING_PSY_ALL_CORE
68195 - Inpatient Moderate Complexity
84881 - Inpatient Moderate Complexity
27448 - Inpatient Low Complexity
00659 - Inpatient Moderate Complexity
60013 - Inpatient Moderate Complexity
11581 - Inpatient Moderate Complexity
48625 - Inpatient Moderate Complexity
15631 - Inpatient Moderate Complexity
37560 - Inpatient Moderate Complexity
50053 - Inpatient High Complexity
81803 - Inpatient Low Complexity
70295 - Inpatient Moderate Complexity
08096 - Inpatient Low Complexity
88660 - Inpatient Moderate Complexity
60591 - Inpatient Moderate Complexity
92325 - Inpatient Moderate Complexity
07040 - Inpatient Moderate Complexity
23567 - Inpatient Moderate Complexity
16500 - Inpatient Moderate Complexity
38700 - Inpatient Low Complexity
61042 - Inpatient Moderate Complexity

## 2021-12-30 NOTE — BH INPATIENT PSYCHIATRY PROGRESS NOTE - NSBHATTENDATTEST_PSY_ALL_CORE
I have personally seen, examined and participated in the care of this patient. I have reviewed all pertinent clinical information, including history, physical exam, plan and the Medical/PA/NP Student’s note and agree except as noted.

## 2021-12-30 NOTE — BH INPATIENT PSYCHIATRY PROGRESS NOTE - NSBHFUPINTERVALHXFT_PSY_A_CORE
Pt reporting mild sx today but VS wnl. Lozenges for scratchy throat ordered. Pt also reporting "body heaviness and body warmth." No COVID hotel had accepted the patient by late afternoon so reached out to medicine to transfer patient to medical floor as pt no longer needs inpatient psychiatric bed. Spoke with Dr. Harris, Dr. Nettles, and Dr. Miltno regarding possibility of transferring pt to medicine.

## 2021-12-30 NOTE — BH INPATIENT PSYCHIATRY PROGRESS NOTE - NSBHADMITIPREASONDETAILS_PSY_A_CORE FT
delusional and has visual hallucinations of "Rastafari"  ; 
delusional and has visual hallucinations of "Pentecostal"  ; 
delusional and has visual hallucinations of "Islam"  ; 
delusional and has visual hallucinations of "Yazidi"  ; 
delusional and has visual hallucinations of "Taoist"  ; 
delusional and has visual hallucinations of "Mu-ism"  ; 
delusional and has visual hallucinations of "Baptism"  ; 
delusional and has visual hallucinations of "Buddhism"  ; 
delusional and has visual hallucinations of "Pentecostal"  ; 
delusional and has visual hallucinations of "Orthodoxy"  ; 
delusional and has visual hallucinations of "Druze"  ; 
delusional and has visual hallucinations of "Jehovah's witness"  ; 
delusional and has visual hallucinations of "Pentecostal"  ; 
delusional and has visual hallucinations of "Episcopal"  ; 
delusional and has visual hallucinations of "Sabianist"  ; 
delusional and has visual hallucinations of "Sikh"  ; 
delusional and has visual hallucinations of "Moravian"  ; 
delusional and has visual hallucinations of "Sikhism"  ; 
delusional and has visual hallucinations of "Tenriism"  ; 
delusional and has visual hallucinations of "Caodaism"  ; 
delusional and has visual hallucinations of "Confucianist"  ;

## 2021-12-30 NOTE — BH INPATIENT PSYCHIATRY PROGRESS NOTE - NSICDXBHPRIMARYDX_PSY_ALL_CORE
Psychosis   F29  
Psychosis   F29  
Schizophrenia   F20.9  
Psychosis   F29  
Schizophrenia   F20.9  
Psychosis   F29  

## 2021-12-30 NOTE — BH INPATIENT PSYCHIATRY PROGRESS NOTE - NSBHADMITMEDEDUDETAILS_PSY_A_CORE FT
Patient states that Risperdal makes her suicidal prefers Zyprexa. 

## 2021-12-31 RX ADMIN — Medication 1 MILLIGRAM(S): at 14:28

## 2021-12-31 RX ADMIN — OLANZAPINE 20 MILLIGRAM(S): 15 TABLET, FILM COATED ORAL at 21:30

## 2021-12-31 RX ADMIN — Medication 25 MILLIGRAM(S): at 00:32

## 2022-01-01 RX ADMIN — OLANZAPINE 20 MILLIGRAM(S): 15 TABLET, FILM COATED ORAL at 22:15

## 2022-01-01 RX ADMIN — Medication 1 MILLIGRAM(S): at 12:13

## 2022-01-02 RX ADMIN — OLANZAPINE 20 MILLIGRAM(S): 15 TABLET, FILM COATED ORAL at 22:24

## 2022-01-02 RX ADMIN — BENZOCAINE AND MENTHOL 1 LOZENGE: 5; 1 LIQUID ORAL at 18:31

## 2022-01-02 RX ADMIN — Medication 1 MILLIGRAM(S): at 18:31

## 2022-01-03 VITALS
SYSTOLIC BLOOD PRESSURE: 128 MMHG | TEMPERATURE: 98 F | HEART RATE: 78 BPM | OXYGEN SATURATION: 98 % | RESPIRATION RATE: 18 BRPM | DIASTOLIC BLOOD PRESSURE: 76 MMHG

## 2022-01-03 PROCEDURE — 99239 HOSP IP/OBS DSCHRG MGMT >30: CPT

## 2022-01-03 RX ADMIN — Medication 1 MILLIGRAM(S): at 11:53

## 2022-01-03 RX ADMIN — OLANZAPINE 5 MILLIGRAM(S): 15 TABLET, FILM COATED ORAL at 11:53

## 2022-01-07 DIAGNOSIS — Z59.00 HOMELESSNESS UNSPECIFIED: ICD-10-CM
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
## 2022-01-07 DIAGNOSIS — F17.200 NICOTINE DEPENDENCE, UNSPECIFIED, UNCOMPLICATED: ICD-10-CM

## 2022-01-07 DIAGNOSIS — S61.411A LACERATION WITHOUT FOREIGN BODY OF RIGHT HAND, INITIAL ENCOUNTER: ICD-10-CM

## 2022-01-07 DIAGNOSIS — F20.0 PARANOID SCHIZOPHRENIA: ICD-10-CM

## 2022-01-07 DIAGNOSIS — U07.1 COVID-19: ICD-10-CM
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
## 2022-01-07 DIAGNOSIS — F12.10 CANNABIS ABUSE, UNCOMPLICATED: ICD-10-CM

## 2022-01-07 DIAGNOSIS — F29 UNSPECIFIED PSYCHOSIS NOT DUE TO A SUBSTANCE OR KNOWN PHYSIOLOGICAL CONDITION: ICD-10-CM

## 2022-01-07 DIAGNOSIS — F19.94 OTHER PSYCHOACTIVE SUBSTANCE USE, UNSPECIFIED WITH PSYCHOACTIVE SUBSTANCE-INDUCED MOOD DISORDER: ICD-10-CM

## 2022-01-07 SDOH — ECONOMIC STABILITY - HOUSING INSECURITY: HOMELESSNESS UNSPECIFIED: Z59.00

## 2022-01-19 NOTE — BH INPATIENT PSYCHIATRY DISCHARGE NOTE - DESCRIPTION
Scheduled for Friday virtually per AG  No further action required  patient demonstrates disorganized thinking; unable to clarify chronologicy.  Parents  "in an urn";  middle of two sibs both drug addicted; 9th grade education; unclear if every worked.

## 2022-02-02 ENCOUNTER — APPOINTMENT (OUTPATIENT)
Dept: ULTRASOUND IMAGING | Facility: CLINIC | Age: 27
End: 2022-02-02
Payer: MEDICARE

## 2022-02-02 PROCEDURE — 76856 US EXAM PELVIC COMPLETE: CPT

## 2022-04-12 PROCEDURE — U0005: CPT

## 2022-04-12 PROCEDURE — U0003: CPT

## 2022-08-14 ENCOUNTER — EMERGENCY (EMERGENCY)
Facility: HOSPITAL | Age: 27
LOS: 1 days | Discharge: HOME | End: 2022-08-14
Admitting: EMERGENCY MEDICINE
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
## 2022-08-14 DIAGNOSIS — S90.511A ABRASION, RIGHT ANKLE, INITIAL ENCOUNTER: ICD-10-CM
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
## 2022-08-14 DIAGNOSIS — S90.512A ABRASION, LEFT ANKLE, INITIAL ENCOUNTER: ICD-10-CM

## 2022-08-14 DIAGNOSIS — R23.8 OTHER SKIN CHANGES: ICD-10-CM
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
## 2022-08-14 PROCEDURE — 99282 EMERGENCY DEPT VISIT SF MDM: CPT

## 2022-12-21 NOTE — BH TREATMENT PLAN - NSTXSUICIDDATEEST_PSY_ALL_CORE
HSS contacted LIC coordinator from [id number] to follow-up on Early Intervention referral per Dr. Gallo.    Left detailed message with my direct contact information.  
05-May-2021
12-May-2021
19-May-2021
02-May-2021

## 2023-05-23 NOTE — PATIENT PROFILE BEHAVIORAL HEALTH - MEDICATION ADMINISTRATION INFO, PROFILE
No no concerns Clindamycin Counseling: I counseled the patient regarding use of clindamycin as an antibiotic for prophylactic and/or therapeutic purposes. Clindamycin is active against numerous classes of bacteria, including skin bacteria. Side effects may include nausea, diarrhea, gastrointestinal upset, rash, hives, yeast infections, and in rare cases, colitis.

## 2024-04-17 NOTE — PROGRESS NOTE BEHAVIORAL HEALTH - NS ED [facility] ROS PSYCHIATRIC
Jannette Barbour  [id number]  4/17/2024      This patient is being seen in consultation from Yoandy Curiel DO.      Date of onset/injury: January 2024      CHIEF COMPLAINT(S):   Chief Complaint   Patient presents with    Right Index Finger - Pain    Office Visit       HISTORY OF PRESENT ILLNESS:  Jannette Barbour is a 58 year old right handed dominant female that comes in for evaluation for a mass at the distal phalangeal joint of her right index finger.  According to the patient the mass has been there about 4 months.  Patient reports that the mass has become progressively larger and is quite painful.  Is limiting her activities of daily living.  Patient reports significant pain with microtrauma.  Patient has tried to aspirate herself and also had aspiration performed by Dr. Curiel.  The mass has returned and she presents today for definitive surgical intervention.    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    Non Hodgkin's lymphoma (CMD)     Shingles 07/08/2021    Vitamin D deficiency 11/15/2013       PAST SURGICAL HISTORY:  Past Surgical History:   Procedure Laterality Date    Cholecystectomy      Hysteroscopy endometrial ablation      Removal of ovary(s)         FAMILY HISTORY:  Family History   Problem Relation Age of Onset    Hypertension Mother     Other Mother         uterine fibroids    Lung Disease Father     Hypertension Maternal Grandmother     Cancer, Lung Maternal Grandmother     Hypertension Maternal Grandfather        SOCIAL HISTORY:  Social History     Socioeconomic History    Marital status: /Civil Union     Spouse name: Not on file    Number of children: 3    Years of education: Not on file    Highest education level: Not on file   Occupational History    Occupation: [employer]    Tobacco Use    Smoking status: Never    Smokeless tobacco: Never   Vaping Use    Vaping status: never used   Substance and Sexual Activity    
Alcohol use: No    Drug use: No    Sexual activity: Not Currently     Birth control/protection: Post-menopausal   Other Topics Concern    Not on file   Social History Narrative    Not on file     Social Determinants of Health     Financial Resource Strain: Not on file   Food Insecurity: Not on file   Transportation Needs: Not on file   Physical Activity: Not on file   Stress: Not on file   Social Connections: Not on file   Interpersonal Safety: Not on file        MEDICATIONS:  Current Outpatient Medications   Medication Sig Dispense Refill    Cholecalciferol (Vitamin D3) 20 mcg (800 units) tablet Take 1 tablet by mouth daily. During FALL/WINTER months 1 tablet `    levothyroxine 75 MCG tablet Take 1 tablet by mouth daily. 90 tablet 1     No current facility-administered medications for this visit.       ALLERGIES:   ALLERGIES:  Covid-19 mrna vacc (moderna/pfizer) and Penicillins    VITAL SIGNS:  Visit Vitals  Ht 5' 2\" (1.575 m)   Wt 87.1 kg (192 lb)   BMI 35.12 kg/m²       REVIEW OF SYSTEMS:  Skin:No problems with hair or nails. No rash. No new skin lesions.  Heent: Pt denies problems with head, eyes, ears, nose, mouth, throat and neck  Respiratory: No coughing, wheezing, changes in voice, nor shortness of breath  Cardiovascular:No chest pain, palpitations or other cardiac complaints noted  Gastrointestinal: No diarrhea, constipation, abdominal pain or other complaints noted  Genitourinary: Pt denies urinary pain, bleeding and voiding problems  Musculoskeletal: Pain located in right index finger and aggravated by pressure.  Neurologic:Pt denies syncope, seizures, paralysis, involuntary movements or gait problems  Psychiatric: Pt denies sleep, anxiety, depression, sexual and other psychiatric problems  Hematologic/Lymphatic/Immunologic: Pt denies hematological, lymphatic and immunological problems  Endocrine: Thyroid Disorder currently taking levothyroxine.       PHYSICAL EXAM:  Alert & orientated x3.  Affect 
appropriate.  HEENT: Normocephalic  Skin : Skin color, texture, turgor normal.   Neuro: Gait and station are appropriate.  Heart: Extremity well perfused  Respiratory: No audible wheezing   Lymph: No lymphadenopathy    Musculoskeletal exam: Right index finger dorsal radial mass this is proximately 0.5 cm x 0.5 cm tender to palpation.  Clear liquid noted.  Gross degenerative changes.  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    No results found.    EMG results: none      ASSESSMENT & PLAN:  Jannette Barbour is a 58 year old female that comes in with:   1. Mucoid cyst of joint        I explained the Diagnosis, pathophysiology, prognosis and natural course and the treatment options.  Patient wishes to proceed with definitive surgical invention  Recommendations are as follows:  1. Excision of Mucous cyst of the right index finger with arthrotomy and possible osteophyte excision  The risks, benefits, limitations, and possible complications of the procedure were discussed in detail with the patient.   Possible complications  include but not limited to  pain, bleeding, infection, nerve damage, incomplete resolutions of symptoms and the need for further surgery. There is a chance and risks of RSD and scar formation that could become hypertrophic and painful.  We shared the decision making, Jannette voiced understanding and informed consent was obtained.     Restrictions: none      Electronically Signed by:    Galen Valencia MD , 04/17/24          Note to patient: The 21st Century Cures Act makes medical notes like these available to patients in the interest of transparency. However, be advised this is a medical document. It is intended as peer to peer communication. It is written in medical language and may contain abbreviations or verbiage that are 
unfamiliar. It may appear blunt or direct. Medical documents are intended to carry relevant information, facts as evident, and the clinical opinion of the practitioner.     Patient was seen with ancillary staff present (This includes nurses, nurse practitioners, physician assistants, athletic trainers and medical assistants).  Notes accepted.  I independently reviewed the patient's history, physical exam and I performed the medical decision-making.     
See HPI

## 2024-06-26 NOTE — PATIENT PROFILE BEHAVIORAL HEALTH - SLEEP, NORMAL REST SCHEDULE, PROFILE
6/26/2024      Kiran Arroyo  [id number] [facility] [address]      Dear Colleague,    Thank you for referring your patient, Kiran Arroyo, to the [facility]. Please see a copy of my visit note below.    Patient seen in consultation for umbilical hernia by Eddy Butt    HPI:  Patient is a 37 year old male  with complaints of bulge at umbilicus  The patient noticed the symptoms about 1 year ago.   When first noticed did have some pain. Now if pushed on can feel it, mild pain  Seems to have been about the same since noticed it  Lost about 15 lbs in last month   nothing makes the episode better.  Patient has not family history of hernia problems    Review Of Systems    Skin: negative  Ears/Nose/Throat: negative  Respiratory: No shortness of breath, dyspnea on exertion, cough, or hemoptysis  Cardiovascular: negative  Gastrointestinal: negative  Genitourinary: negative  Musculoskeletal: negative  Neurologic: negative  Hematologic/Lymphatic/Immunologic: negative  Endocrine: negative      Past Medical History:   Diagnosis Date     Obesity 9/9/2016       Past Surgical History:   Procedure Laterality Date     WISDOM TOOTH EXTRACTION         Social History     Socioeconomic History     Marital status:      Spouse name: Not on file     Number of children: Not on file     Years of education: Not on file     Highest education level: Not on file   Occupational History     Not on file   Tobacco Use     Smoking status: Never     Smokeless tobacco: Never   Vaping Use     Vaping status: Never Used   Substance and Sexual Activity     Alcohol use: No     Drug use: No     Sexual activity: Yes     Partners: Female   Other Topics Concern     Not on file   Social History Narrative     Not on file     Social Determinants of Health     Financial Resource Strain: Low Risk  (4/18/2024)    Financial Resource Strain      Within the past 12 months, have you or your family members you live with  been unable to get utilities (heat, electricity) when it was really needed?: No   Food Insecurity: Low Risk  (4/18/2024)    Food Insecurity      Within the past 12 months, did you worry that your food would run out before you got money to buy more?: No      Within the past 12 months, did the food you bought just not last and you didn t have money to get more?: No   Transportation Needs: Low Risk  (4/18/2024)    Transportation Needs      Within the past 12 months, has lack of transportation kept you from medical appointments, getting your medicines, non-medical meetings or appointments, work, or from getting things that you need?: No   Physical Activity: Insufficiently Active (4/18/2024)    Exercise Vital Sign      Days of Exercise per Week: 4 days      Minutes of Exercise per Session: 20 min   Stress: Stress Concern Present (4/18/2024)    [employer] [place] [employer] - Occupational Stress Questionnaire      Feeling of Stress : To some extent   Social Connections: Unknown (4/18/2024)    Social Connection and Isolation Panel [NHANES]      Frequency of Communication with Friends and Family: Not on file      Frequency of Social Gatherings with Friends and Family: Once a week      Attends Yazidism Services: Not on file      Active Member of Clubs or Organizations: Not on file      Attends Club or Organization Meetings: Not on file      Marital Status: Not on file   Interpersonal Safety: Low Risk  (4/18/2024)    Interpersonal Safety      Do you feel physically and emotionally safe where you currently live?: Yes      Within the past 12 months, have you been hit, slapped, kicked or otherwise physically hurt by someone?: No      Within the past 12 months, have you been humiliated or emotionally abused in other ways by your partner or ex-partner?: No   Housing Stability: High Risk (4/18/2024)    Housing Stability      Do you have housing? : Yes      Are you worried about losing your housing?: Yes       Current  Outpatient Medications   Medication Sig Dispense Refill     azelaic acid (FINACIA) 15 % external gel APPLY TOPICALLY TO AFFECTED AREA(S) OF SKIN ONCE DAILY (Patient not taking: Reported on 4/18/2024)       CLARAVIS 40 MG capsule        doxycycline hyclate (VIBRAMYCIN) 100 MG capsule  (Patient not taking: Reported on 4/18/2024)       EPINEPHrine (ANY BX GENERIC EQUIV) 0.3 MG/0.3ML injection 2-pack Inject 0.3 mLs (0.3 mg) into the muscle as needed for anaphylaxis 2 each 1     metroNIDAZOLE (METROCREAM) 0.75 % external cream  (Patient not taking: Reported on 4/18/2024)         Medications and history reviewed    Physical exam:  Vitals: /88   Pulse 87   BMI= There is no height or weight on file to calculate BMI.    Constitutional: healthy, alert, and no distress  Head: Normocephalic. No masses, lesions, tenderness or abnormalities  Gastrointestinal: Abdomen soft, non-tender. BS normal. No masses, organomegaly, positive findings: small umbilical hernia, not reducible.  Feels to be only fatty tissue.  : Deferred  Musculoskeletal: extremities normal- no gross deformities noted, gait normal, and normal muscle tone  Skin: no suspicious lesions or rashes  Psychiatric: mentation appears normal and affect normal/bright        Assessment:     ICD-10-CM    1. Umbilical hernia without obstruction and without gangrene  K42.9 Adult General Surg Referral     Case Request: HERNIORRHAPHY, UMBILICAL, OPEN possible mesh        Plan: Risks of surgery discussed including, but not limited to bleeding, infection, recurrence, damage to intra-abdominal contents such as nerves, blood vessels, bowel or other organs.  Risks of anesthesia also discussed.  Although mesh is a better long term repair if it gets infected it must be removed. Mesh problems such as fracture, erosion or adhesions are possible.  If there is evidence of an infection at time of surgery it will be cancelled and rescheduled to when well.    Small chronically  incarcerated umbilical hernia containing some fatty tissue, symptomatic.  Offered an open repair for him.  Based on clinical exam this likely would be a primary repair only but discussed that depending on defect size may want to do mesh reinforcement.  Patient questions answered.  Will work on scheduling for him.      Roberto Gardner MD      Again, thank you for allowing me to participate in the care of your patient.        Sincerely,        Roberto Gardner MD   more than 8 hrs/night

## 2024-06-30 NOTE — BH INPATIENT PSYCHIATRY PROGRESS NOTE - NSBHFUPINTERVALHXFT_PSY_A_CORE
Problem: Patient/Family Goals  Goal: Patient/Family Long Term Goal  Description: Patient's Long Term Goal: \"go home\"    Interventions:  - Neuro consult  -PT/OT to eval  -Neuro checks  -Stroke protocol  - See additional Care Plan goals for specific interventions  Outcome: Progressing  Goal: Patient/Family Short Term Goal  Description: Patient's Short Term Goal: improvement in L sided weakness    Interventions:   - neuro checks  -PT/OT  - See additional Care Plan goals for specific interventions  Outcome: Progressing     Problem: NEUROLOGICAL - ADULT  Goal: Achieves stable or improved neurological status  Description: INTERVENTIONS  - Assess for and report changes in neurological status  - Initiate measures to prevent increased intracranial pressure  - Maintain blood pressure and fluid volume within ordered parameters to optimize cerebral perfusion and minimize risk of hemorrhage  - Monitor temperature, glucose, and sodium. Initiate appropriate interventions as ordered  Outcome: Progressing      Isolative; stays in bed; makes minimal eye contact; not conversational.  May have been asleep or avoidant.  attending some groups and more appropriate.

## 2024-11-15 NOTE — BH INPATIENT PSYCHIATRY DISCHARGE NOTE - NSBHDCSIGEVENTSFT_PSY_A_CORE
Patient requiring fill of her Norco; Dr. Burnham out of office so message given to covering CNP and medication was filled. Daughter aware.   
Spit at a staff member several weeks ago

## 2025-03-10 NOTE — [facility] INPATIENT PSYCHIATRY PROGRESS NOTE - NSBHMSEATTEN_PSY_A_CORE
"  History     Chief Complaint   Patient presents with    Stroke Symptoms     HPI  Trina Rodriguez is a 89 year old female who has history of TIA, history of CVA, hyperlipidemia, type 2 diabetes, sleep apnea, vascular dementia, who presents to the ER via EMS for evaluation of stroke symptoms.  History was entirely obtained from EMS.  Patient was reportedly last seen normal between 4 and 430 at dinnertime.  She was then found down on the ground around midnight.  She was confused and had an obvious right facial droop and right-sided weakness.  Patient is DNR/DNI.  Patient unable to provide any meaningful history.  EMS noted blood sugar to be 290.  Blood pressure was systolic in the 160s and 170s.  She just says \"yeah\" to everything.    Allergies:  No Known Allergies    Problem List:    There are no active problems to display for this patient.       Past Medical History:    No past medical history on file.    Past Surgical History:    No past surgical history on file.    Family History:    No family history on file.    Social History:  Marital Status:   [5]        Medications:    No current outpatient medications on file.        Review of Systems  See HPI  Physical Exam         Physical Exam  BP (!) 200/161   Pulse 81   Temp 98.5  F (36.9  C) (Axillary)   Resp 26   Wt (P) 86 kg (189 lb 8 oz)   SpO2 94%   BMI (P) 34.66 kg/m    General: Awake staring to her left.  She says \"yeah\" to everything  Head: atraumatic  Nose: no rhinorrhea or epistaxis  Ears: no external auditory canal discharge or bleeding.    Eyes: Sclera nonicteric. Conjunctiva noninjected. PERRL, EOMI  Mouth: no tonsillar erythema, edema, or exudate.  Moist mucous membranes  Neck: supple, moving spontaneously no midline cervical tenderness  Lungs: No increased work of breathing.  Clear to auscultation bilaterally.  CV: RRR, peripheral pulses palpable and symmetric  Abdomen: soft, nt, nd, no guarding or rebound.   Extremities: Warm and " "well-perfused.    Skin: no rash or diaphoresis  Neuro: Difficult to get an accurate NIH stroke scale as she does not follow commands consistently.  She is awake with her eyes looking to the left with possible right sided neglect.  She is able to hold her left arm and leg up, but does not hold or move her right arm or leg.  She has an obvious right-sided facial droop.  She responds to all questions by saying \"yeah\"        ED Course        Procedures              EKG Interpretation:      Interpreted by Navdeep Monique MD  Time reviewed: 2:25 AM    Symptoms at time of EKG: Stroke symptoms   Rhythm: normal sinus   Rate: normal  Axis: normal  Ectopy: none  Conduction: normal  ST Segments/ T Waves: No ST-T wave changes  Q Waves: none  Comparison to prior: No old EKG available    Clinical Impression: normal EKG      Critical Care time:  none             Results for orders placed or performed during the hospital encounter of 03/10/25 (from the past 24 hours)   Comprehensive metabolic panel   Result Value Ref Range    Sodium 137 135 - 145 mmol/L    Potassium 4.3 3.4 - 5.3 mmol/L    Carbon Dioxide (CO2) 26 22 - 29 mmol/L    Anion Gap 10 7 - 15 mmol/L    Urea Nitrogen 23.2 (H) 8.0 - 23.0 mg/dL    Creatinine 0.71 0.51 - 0.95 mg/dL    GFR Estimate 78 >60 mL/min/1.73m2    Calcium 8.7 (L) 8.8 - 10.4 mg/dL    Chloride 101 98 - 107 mmol/L    Glucose 262 (H) 70 - 99 mg/dL    Alkaline Phosphatase 85 40 - 150 U/L    AST 23 0 - 45 U/L    ALT 12 0 - 50 U/L    Protein Total 7.1 6.4 - 8.3 g/dL    Albumin 3.2 (L) 3.5 - 5.2 g/dL    Bilirubin Total 0.2 <=1.2 mg/dL   CBC with platelets, differential    Narrative    The following orders were created for panel order CBC with platelets, differential.  Procedure                               Abnormality         Status                     ---------                               -----------         ------                     CBC with platelets and ...[[id number]]  Abnormal            Final result       " Normal           Please view results for these tests on the individual orders.   [place] Draw    Narrative    The following orders were created for panel order [place] Draw.  Procedure                               Abnormality         Status                     ---------                               -----------         ------                     Extra Blue Top Tube[[id number]]                             Final result               Extra Green Top (Lithiu...[[id number]]                                                 Extra Purple Top Tube[[id number]]                                                        Please view results for these tests on the individual orders.   CBC with platelets and differential   Result Value Ref Range    WBC Count 8.5 4.0 - 11.0 10e3/uL    RBC Count 3.99 3.80 - 5.20 10e6/uL    Hemoglobin 11.5 (L) 11.7 - 15.7 g/dL    Hematocrit 37.0 35.0 - 47.0 %    MCV 93 78 - 100 fL    MCH 28.8 26.5 - 33.0 pg    MCHC 31.1 (L) 31.5 - 36.5 g/dL    RDW 14.8 10.0 - 15.0 %    Platelet Count 214 150 - 450 10e3/uL    % Neutrophils 55 %    % Lymphocytes 35 %    % Monocytes 7 %    % Eosinophils 2 %    % Basophils 1 %    % Immature Granulocytes 0 %    NRBCs per 100 WBC 0 <1 /100    Absolute Neutrophils 4.6 1.6 - 8.3 10e3/uL    Absolute Lymphocytes 3.0 0.8 - 5.3 10e3/uL    Absolute Monocytes 0.6 0.0 - 1.3 10e3/uL    Absolute Eosinophils 0.2 0.0 - 0.7 10e3/uL    Absolute Basophils 0.0 0.0 - 0.2 10e3/uL    Absolute Immature Granulocytes 0.0 <=0.4 10e3/uL    Absolute NRBCs 0.0 10e3/uL   Extra Blue Top Tube   Result Value Ref Range    Hold Specimen JIC    CBC with Platelets & Differential *Canceled*    Narrative    The following orders were created for panel order CBC with Platelets & Differential.  Procedure                               Abnormality         Status                     ---------                               -----------         ------                     CBC with platelets and ...[[id number]]                                                    Please view results for these tests on the individual orders.   INR   Result Value Ref Range    INR 0.89 0.85 - 1.15   Partial thromboplastin time   Result Value Ref Range    aPTT 26 22 - 38 Seconds   CT Head w/o Contrast    Narrative    EXAM: CT HEAD W/O CONTRAST  LOCATION: [facility]  DATE: 3/10/2025    INDICATION: Code Stroke, rule out hemorrhage and evaluate for potential thrombolysis thrombectomy. PLEASE READ IMMEDIATELY. Right-sided weakness and facial droop  COMPARISON: 10/2/2024  TECHNIQUE: Routine CT Head without IV contrast. Multiplanar reformats. Dose reduction techniques were used.    FINDINGS:  INTRACRANIAL CONTENTS: No intracranial hemorrhage, extraaxial collection, or mass effect.  No CT evidence of acute infarct. Moderate presumed chronic small vessel ischemic changes. Moderate generalized volume loss. No hydrocephalus.     VISUALIZED ORBITS/SINUSES/MASTOIDS: Prior bilateral cataract surgery. Visualized portions of the orbits are otherwise unremarkable. No paranasal sinus mucosal disease. No middle ear or mastoid effusion.    BONES/SOFT TISSUES: No acute abnormality.      Impression    IMPRESSION:  1.  No CT evidence for acute intracranial process.  2.  Brain atrophy and presumed chronic microvascular ischemic changes as above.    Findings were discussed with Dr. Monique at 1:06 AM CST on 3/10/2025.   CTA Head Neck with Contrast    Narrative    EXAM: CTA HEAD NECK W CONTRAST  LOCATION: [facility]  DATE: 3/10/2025    INDICATION: Code Stroke, evaluate for LVO. PLEASE READ IMMEDIATELY. Right-sided weakness and facial droop.  COMPARISON: Head CT from same day.  CONTRAST: 67 mL Isovue 370  TECHNIQUE: Head and neck CT angiogram with IV contrast. Noncontrast head CT followed by axial helical CT images of the head and neck vessels obtained during the arterial phase of intravenous contrast administration. Axial 2D  reconstructed images and   multiplanar 3D MIP reconstructed images of the head and neck vessels were performed by the technologist. Dose reduction techniques were used. All stenosis measurements made according to NASCET criteria unless otherwise specified.    FINDINGS:   HEAD CTA:  ANTERIOR CIRCULATION: No proximal arterial occlusion. Diffuse atherosclerotic calcification in the carotid siphons without flow-limiting stenosis. Mild to moderate atheromatous irregularity of the JAZMIN and MCA branches. No saccular aneurysm or high flow   vascular malformation. Standard [place] of Fournier anatomy.    POSTERIOR CIRCULATION: Nonflow limiting atherosclerotic calcification in the vertebral arteries. No proximal arterial occlusion, saccular aneurysm, or high flow vascular malformation. Mild to moderate atheromatous irregularity of the PCA branches.   Balanced vertebral arteries supply a normal basilar artery.     DURAL VENOUS SINUSES: Not well evaluated on a technical basis.    NECK CTA:  RIGHT CAROTID: Atherosclerotic plaque results in less than 50% stenosis in the right ICA. No dissection.    LEFT CAROTID: Atherosclerotic plaque results in less than 50% stenosis in the left ICA. No dissection.    VERTEBRAL ARTERIES: No focal stenosis or dissection. Balanced vertebral arteries.    AORTIC ARCH: Three-vessel aortic arch. The proximal great vessels are widely patent. There is irregular atherosclerotic plaque throughout the descending aorta.    NONVASCULAR STRUCTURES: Visualized portions of the lungs are clear. Multilevel cervical spondylosis, with mild to moderate canal stenosis from C4-C5 through C6-C7.      Impression    IMPRESSION:   HEAD CTA:  Intracranial atherosclerosis, without evidence of a proximal arterial occlusion.    NECK CTA:  1.  Nonflow limiting atherosclerotic plaque at the carotid bifurcations.  2.  No evidence of dissection.    Findings were discussed with Dr. Monique at 1:15 AM CST on 3/10/2025     CBC with  Platelets & Differential *Canceled*    Narrative    The following orders were created for panel order CBC with Platelets & Differential.  Procedure                               Abnormality         Status                     ---------                               -----------         ------                       Please view results for these tests on the individual orders.   CT Head Perfusion w Contrast - For Tier 2 Stroke    Narrative    EXAM: CT HEAD PERFUSION W CONTRAST  LOCATION: [facility]  DATE: 3/10/2025    INDICATION: Code Stroke to evaluate for potential thrombolysis and thrombectomy. Evaluate mismatch between penumbra and core infarct. READ IMMEDIATELY. Right-sided weakness and facial droop.  COMPARISON: CT head and CTA head and neck from same day  TECHNIQUE: CT cerebral perfusion was performed utilizing a second contrast bolus. Perfusion data were post processed with generation of standard perfusion maps and estimation of ischemic/infarcted volumes utilizing standard threshold values. Dose   reduction techniques were used. All stenosis measurements made according to NASCET criteria unless otherwise specified.  CONTRAST: 50 mL Isovue 370    FINDINGS:   CT PERFUSION:  PERFUSION MAPS: Symmetrical cerebral perfusion. No focal deficits in cerebral blood flow or volume to suggest ischemia/oligemia.    RAPID ANALYSIS:  CBF<30%: 0 mL  Tmax>6sec: 0 mL  Mismatch volume: 0 mL  Mismatch ratio: None      Impression    IMPRESSION:   Normal cerebral perfusion.    Findings were discussed with Dr. Monique at 1:45 AM CST on 3/10/2025.   EKG 12-lead, tracing only   Result Value Ref Range    Systolic Blood Pressure  mmHg    Diastolic Blood Pressure  mmHg    Ventricular Rate 75 BPM    Atrial Rate 76 BPM    IN Interval 216 ms    QRS Duration 90 ms     ms    QTc 433 ms    P Axis 88 degrees    R AXIS 35 degrees    T Axis 36 degrees    Interpretation ECG       Undetermined rhythm  Nonspecific T  "wave abnormality  Abnormal ECG  No previous ECGs available     UA with Microscopic reflex to Culture    Specimen: Urine, Catheter   Result Value Ref Range    Color Urine Straw Colorless, Straw, Light Yellow, Yellow    Appearance Urine Slightly Cloudy (A) Clear    Glucose Urine 200 (A) Negative mg/dL    Bilirubin Urine Negative Negative    Ketones Urine Negative Negative mg/dL    Specific Gravity Urine 1.024 1.003 - 1.035    Blood Urine Negative Negative    pH Urine 7.5 (H) 5.0 - 7.0    Protein Albumin Urine Negative Negative mg/dL    Urobilinogen Urine Normal Normal, 2.0 mg/dL    Nitrite Urine Negative Negative    Leukocyte Esterase Urine Large (A) Negative    RBC Urine 19 (H) <=2 /HPF    WBC Urine 35 (H) <=5 /HPF    Squamous Epithelials Urine 1 <=1 /HPF    Narrative    Urine Culture ordered based on laboratory criteria       Medications   cefTRIAXone (ROCEPHIN) 1 g vial to attach to  mL bag for ADULTS or NS 50 mL bag for PEDS (1 g Intravenous $New Bag 3/10/25 0227)   iopamidol (ISOVUE-370) solution 167 mL (167 mLs Intravenous $Given 3/10/25 0059)     And   sodium chloride 0.9 % bag for CT scan flush use (100 mLs As instructed $Given 3/10/25 0059)   labetalol (NORMODYNE/TRANDATE) injection 10 mg (10 mg Intravenous $Given 3/10/25 0229)       Assessments & Plan (with Medical Decision Making)     I have reviewed the nursing notes.    I have reviewed the findings, diagnosis, plan and need for follow up with the patient.          Medical Decision Making  Trina Rodriguez is a 89 year old female who has history of TIA, history of CVA, hyperlipidemia, type 2 diabetes, sleep apnea, vascular dementia, who presents to the ER via EMS for evaluation of stroke symptoms.  Vital signs reviewed and first blood pressure reading showed hypotension, but subsequent blood pressures have been hypertensive.  History exam as above.  Most concern for acute CVA.  She has obvious right-sided deficits and only saying \"yeah\" to all " questions her last known well was well outside the window for thrombolytics, so tier 2 code stroke called.  I received calls from [place] radiology, no acute findings on her Noncon CT of her head, CTA of the head neck or CT perfusion.  Discussed with stroke neurology.  They are not convinced that this represented CVA as they would expect to have seen something with the deficits were seen.  Possible that it could have been TIA.  Her symptoms seem to be improving.  She has now been moving her entire right side.  She is still not following commands consistently or speaking.  She no longer has a right-sided facial droop.  She remains hypertensive.  Possible that this could be hypertensive emergency.  I am getting give her some labetalol and see if that is helpful.  I would not want to lower her blood pressure more than to a systolic blood pressure around 160.  Her EKG is reassuring.  CBC is without leukocytosis.  Hemoglobin is 11.5.  Her CMP is relatively reassuring.  She is hyperglycemic but no evidence of acidosis.  Her coags are normal.  UA is concerning for possible UTI.  The patient also dropped her sats to 88%.  We do get a chest x-ray to rule out respiratory infection.  She is given ceftriaxone for UTI.  Discussed case again with stroke neuro.  They recommended admission and obtaining an MRI and consideration of EEG.  No beds available locally.  There is a bed available at [facility], but unable to get a hold of the patient's children to consent for transfer.  I have called them multiple times without any response.  Will plan on admitting the patient here when a bed is available unless we get consent to transfer the patient.  Chest x-ray does not show any acute infiltrates or pneumothorax.  She does have pulmonary congestion.  BNP is not elevated.  She is not in any respiratory distress and not requiring any oxygen at this time.  The patient has been putting out large amounts of urine.  I do not see that  she has a diuretic on her med list.  Her electrolytes do not suggest diabetes insipidus, but I will add on a urine osmolality.  Patient is going to need to be admitted to the hospital.  Patient will be signed out to Dr. Gonzales pending admission to the hospital.  Does not need to wait for the MRI if a bed becomes available.        New Prescriptions    No medications on file       Final diagnoses:   Hypertension, unspecified type   Right sided weakness   Facial droop       3/10/2025   [facility] EMERGENCY DEPT       Navdeep Monique MD  03/10/25 0426     Unable to assess

## 2025-05-29 NOTE — PATIENT PROFILE BEHAVIORAL HEALTH - [id number]_CORE
SUBJECTIVE: Ms. Castañeda is a 33 year old female who is here for follow up of HTN, hyperlipidemia, AHDH, vit D def, GERD, obesity     The patient presents for evaluation of vitamin D deficiency, anxiety, and hyperlipidemia.    She reports experiencing fatigue and confirms she has not been taking any vitamin D supplements. Previously, she was on a multivitamin regimen but has since discontinued it. She inquires about the possibility of resuming a multivitamin, which needs to be prescribed due to the regulations of her group home.    She is considering rescheduling her pelvic examination and has requested a prescription for medication to manage her anxiety during the procedure. The doctor suggests Xanax instead of Valium, with instructions to take one tablet one hour prior to the exam and keep another tablet on hand to be taken as needed during the exam.    She also requests a refill of her acetaminophen prescription.    Additionally, she expresses concern about her sodium intake, recalling a period of high consumption 1 to 2 months ago, which included foods such as pickles and olives, and their respective juices.    The following histories were reviewed and updated with the patient today:  Past Medical History:   Diagnosis Date   • ADHD (attention deficit hyperactivity disorder)    • Anxiety    • Asperger's syndrome (CMD)    • Bipolar disorder (CMD)    • Colon polyp 12/17/2024    Dr. Coleman   • Depression    • Esophageal reflux    • Esophagitis 12/17/2024    Dr. Coleman   • Gastritis 12/17/2024    Dr. Coleman   • GERD (gastroesophageal reflux disease)    • Palpitations 04/04/2014   • Reactive attachment disorder      Past Surgical History:   Procedure Laterality Date   • Colonoscopy  12/17/2024    Dr. Coleman   • Esophagogastroduodenoscopy (egd)  12/17/2024    Dr. Coleman     Family History   Problem Relation Age of Onset   • NEGATIVE FAMILY HX OF Mother         age 48, good health   • NEGATIVE FAMILY HX OF Father          age 43, good health     ALLERGIES:  Lithium  Current Outpatient Medications   Medication Sig Dispense Refill   • cholecalciferol 1.25 mg (50,000 units) tablet Take 1 tablet by mouth 1 day a week. 12 tablet 1   • ALPRAZolam (XANAX) 0.5 MG tablet Take 1 tablet by mouth daily as needed for Anxiety. Take one tablet one hour prior to the exam and keep one more to be taken as needed. 2 tablet 0   • acetaminophen (TYLENOL) 325 MG tablet Take 2 tablets by mouth 2 times daily as needed for Pain. 100 tablet 5   • calcium carbonate (TUMS EX) 750 MG chewable tablet Chew 1 tablet by mouth daily. 30 tablet 5   • omeprazole (PriLOSEC) 40 MG capsule Take 1 capsule by mouth daily. 90 capsule 5   • propRANolol (INDERAL) 10 MG tablet Take 2 tablets by mouth 4 times daily. 120 tablet 5   • Psyllium (Metamucil) 48.57 % Powder One tablespoon with 8 oz of water daily 1 each 5   • Multiple Vitamins-Iron (Multivitamin Plus Iron Adult) Tab Take 1 tablet by mouth daily. 30 tablet 5   • gabapentin (NEURONTIN) 300 MG capsule TAKE ONE CAPSULE BY MOUTH EVERY NIGHT AT BEDTIME 30 capsule 0   • azelastine (ASTELIN) 0.1 % nasal spray SPRAY ONE SPRAY in each nostril in THE morning AND ONE SPRAY in THE evening. USE in each nostril as directed 30 mL 1   • fluticasone (FLONASE) 50 MCG/ACT nasal spray Spray 2 sprays in each nostril in the morning and 2 sprays in the evening. 16 g 12   • sertraline (ZOLOFT) 25 MG tablet Take 25 mg by mouth daily.     • hydroCORTisone (ANUSOL-HC) 2.5 % rectal cream Place 1 application. rectally in the morning and 1 application. in the evening. 30 g 1   • calcium carbonate (Antacid) 500 MG chewable tablet Chew 1 tablet by mouth daily. 30 tablet 5   • bismuth subsalicylate (Pink Bismuth) 262 MG/15ML suspension Take 30 mLs by mouth every 24 hours as needed.     • clonazePAM (KlonoPIN) 0.5 MG tablet Take 0.5 mg by mouth.     • clonazePAM (KlonoPIN) 1 MG tablet Take 1 mg by mouth daily.     • divalproex (DEPAKOTE ER) 500 MG  24 hr ER tablet      • divalproex (DEPAKOTE ER) 500 MG 24 hr ER tablet Take 2,000 mg by mouth daily.     • carBAMazepine (TEGretol) 200 MG tablet Take 200 mg by mouth.     • guanfacine (TENEX) 2 MG tablet      • ibuprofen (MOTRIN) 400 MG tablet      • guanFACINE (INTUNIV) 2 MG TABLET SR 24 HR daily.     • haloperidol (HALDOL) 10 MG tablet Take 10 mg by mouth 4 times daily.     • benztropine (COGENTIN) 1 MG tablet Take 1 mg by mouth in the morning and 1 mg in the evening.     • benztropine (COGENTIN) 2 MG tablet Take 2 mg by mouth in the morning and 2 mg in the evening.     • cyclobenzaprine (FLEXERIL) 10 MG tablet Take 10 mg by mouth 3 times daily as needed for Muscle spasms.     • LORazepam (ATIVAN) 1 MG tablet Take 1 mg by mouth every 6 hours as needed for Anxiety.     • Melatonin 10 MG Tab      • paliperidone (INVEGA) 6 MG 24 hr tablet Take 6 mg by mouth every morning.     • divalproex (Depakote) 250 MG delayed release EC tablet Take 1 tablet by mouth in the morning and 1 tablet in the evening. 60 tablet 0   • Multiple Vitamins-Minerals (THEREMS-M) TABS Take 1 tablet by mouth daily.       No current facility-administered medications for this visit.       OBJECTIVE:  Blood pressure 130/80, pulse 83, height 5' 6\" (1.676 m), weight (!) 136.5 kg (301 lb), last menstrual period 09/04/2024, SpO2 98%.    EXAM  CONSTITUTIONAL: The patient appears comfortable, oriented x3, normal gait and in no apparent distress.  RESPIRATORY:  Lungs are clear to auscultation without rales, wheezes, or rhonchi.  Percussion is within normal limits without dullness or hyperresonance.  The respiratory effort appears normal.  CARDIAC:  Regular rate and rhythm without S3, S4, heave or thrill. No murmur  EXTREMITIES: Upper extremities are without clubbing, cyanosis, or edema noted., Lower extermities no pedal edema noted, and Pedal pulses are normal.  NEUROLOGICAL: Grossly intact with no focal neurological deficit. Deep tendon reflexes are  symmetrical bilaterally and normal.  Normal motor and sensory exam.    ASSESSMENT/PLAN:     ASSESSMENT: Gastroesophageal reflux disease without esophagitis  (primary encounter diagnosis)    Asperger's syndrome (CMD)    Bipolar affective disorder, current episode mixed, current episode severity unspecified  (CMD)    Attention deficit hyperactivity disorder (ADHD), unspecified ADHD type    Recurrent major depressive disorder, remission status unspecified (CMD)    Anxiety    Reactive attachment disorder    Obesity, morbid, BMI 40.0-49.9  (CMD)    Screening for malignant neoplasm of cervix  Plan: SERVICE TO OB GYN    Vitamin D deficiency  Plan: Vitamin D -25 Hydroxy    Hyperlipidemia LDL goal <100  Plan: CBC with Automated Differential, Comprehensive         Metabolic Panel, Thyroid Stimulating Hormone,         Lipid Panel With Reflex, Urinalysis With         Microscopy & Culture If Indicated        1. Vitamin D deficiency.  - Vitamin D levels remain suboptimal, which could be contributing to fatigue, lethargy, weakness, and unstable gait.  - Blood pressure is stable at 130/80.  - Prescription for vitamin D to be taken once weekly will be provided.  - Multivitamin with iron prescribed.    2. Anxiety.  - Considering rescheduling pelvic examination.  - Referral to gynecologist initiated.  - Prescription for Xanax, consisting of 2 tablets, will be provided.  - Advised to take 1 tablet an hour prior to the examination and keep the second tablet for use during the examination if necessary.    3. Hyperlipidemia.  - Cholesterol level is 152, and triglyceride level is marginally above the normal range.  - Blood count, liver, kidney function, and thyroid levels are normal.  - Advised to maintain a healthy diet and regular exercise regimen.    4. Medication management.  - Refills for Tylenol, calcium carbonate antacid, and Metamucil will be provided.  - Prescription for a daily multivitamin with iron will also be given.  -  Depakote management to be coordinated with psychiatrist.    Follow-up  The patient will follow up in September.    Patient will return to clinic in September 2025 and will get lab work done one week prior to the office visit.     auditory/visual